# Patient Record
Sex: FEMALE | Employment: PART TIME | URBAN - METROPOLITAN AREA
[De-identification: names, ages, dates, MRNs, and addresses within clinical notes are randomized per-mention and may not be internally consistent; named-entity substitution may affect disease eponyms.]

---

## 2019-06-20 PROBLEM — F17.210 CIGARETTE SMOKER: Status: ACTIVE | Noted: 2019-06-20

## 2019-06-21 ENCOUNTER — HOSPITAL ENCOUNTER (OUTPATIENT)
Dept: GENERAL RADIOLOGY | Age: 59
Discharge: HOME | End: 2019-06-21
Payer: SELF-PAY

## 2019-06-21 ENCOUNTER — HOSPITAL ENCOUNTER (EMERGENCY)
Age: 59
Discharge: STILL ADMITTED OR ADMINISTRATIVE | End: 2019-06-21
Attending: EMERGENCY MEDICINE

## 2019-06-21 ENCOUNTER — APPOINTMENT (OUTPATIENT)
Dept: GENERAL RADIOLOGY | Age: 59
End: 2019-06-21

## 2019-06-21 DIAGNOSIS — G89.29 CHRONIC LEFT-SIDED LOW BACK PAIN WITH LEFT-SIDED SCIATICA: ICD-10-CM

## 2019-06-21 DIAGNOSIS — M54.42 CHRONIC LEFT-SIDED LOW BACK PAIN WITH LEFT-SIDED SCIATICA: ICD-10-CM

## 2019-06-21 PROCEDURE — 75810000275 HC EMERGENCY DEPT VISIT NO LEVEL OF CARE: Performed by: EMERGENCY MEDICINE

## 2019-06-21 PROCEDURE — 72110 X-RAY EXAM L-2 SPINE 4/>VWS: CPT

## 2019-06-21 PROCEDURE — 72220 X-RAY EXAM SACRUM TAILBONE: CPT

## 2019-09-16 PROBLEM — E78.01 FAMILIAL HYPERCHOLESTEREMIA: Status: ACTIVE | Noted: 2019-09-16

## 2020-01-30 ENCOUNTER — PATIENT OUTREACH (OUTPATIENT)
Dept: CASE MANAGEMENT | Age: 60
End: 2020-01-30

## 2020-01-30 NOTE — PROGRESS NOTES
CCM received referral from pcp office for medication assistance. CCM outreached to patient. no answer at this time, message left. If no response, CCM will outreach patient tomorrow 1/31/20. Referral was sent to [employer] by  for assistance. Message left with [employer] to ensure they received all information. This note will not be viewable in [address]

## 2020-01-31 ENCOUNTER — PATIENT OUTREACH (OUTPATIENT)
Dept: CASE MANAGEMENT | Age: 60
End: 2020-01-31

## 2020-01-31 NOTE — PROGRESS NOTES
CCM received call from access health liason and she states she has not received the referral from pcp office. CCM contacted pcp office to see if they can refax referral. CCM received call back from patient. Patient states she is needing assistance with her cholesterol medication Repatha. CCM discussed that I am in the process of working with [employer] and they will contact patient once all information received. CCM also emailed patient  web site as they also have assistance as well. Pt appreciative. CCM will follow up next week with patient. Patient agreeable. This note will not be viewable in [address]

## 2020-01-31 NOTE — PROGRESS NOTES
CCM outreached to patient. No answer at this time, message left. If no response, CCM will outreach to patient on 2/5/20. This note will not be viewable in [address]

## 2020-02-05 ENCOUNTER — PATIENT OUTREACH (OUTPATIENT)
Dept: CASE MANAGEMENT | Age: 60
End: 2020-02-05

## 2020-02-05 NOTE — PROGRESS NOTES
CCM received call from patient. Patient states she is doing well at this time. Patient states she has signed up with [facility] and is able to afford cholesterol medication with coupon. Patient did state that she was trying to get a cardiologist at this time. Discussed with patient if she ever signed up for [facility] financial assistance. CCM to email patient financial assistance application. CCM discussed with patient that I will follow up next week. Patient agreeable. This note will not be viewable in [address]

## 2020-02-05 NOTE — PROGRESS NOTES
CCM outreached to patient. No answer at this time, message left. Awaiting response. [facility] also left message with [facility] liaison to confirm if they received referral information. Awaiting response. [facility] will outreach patient once I hear back from [facility]. This note will not be viewable in [address]

## 2020-02-06 ENCOUNTER — PATIENT OUTREACH (OUTPATIENT)
Dept: CASE MANAGEMENT | Age: 60
End: 2020-02-06

## 2020-02-06 NOTE — PROGRESS NOTES
Received call from [facility] liaison stating they did receive the referral for patient. Discussed that they will be reaching out to patient. Patient made aware. This note will not be viewable in [address]

## 2020-02-12 ENCOUNTER — PATIENT OUTREACH (OUTPATIENT)
Dept: CASE MANAGEMENT | Age: 60
End: 2020-02-12

## 2020-02-12 NOTE — PROGRESS NOTES
CCM outreached to patient. No answer at this time, message left. If no response, [facility] will outreach next week. This note will not be viewable in [address]

## 2020-02-19 ENCOUNTER — PATIENT OUTREACH (OUTPATIENT)
Dept: CASE MANAGEMENT | Age: 60
End: 2020-02-19

## 2020-02-19 NOTE — PROGRESS NOTES
CCM outreached to patient. No answer at this time, message left. Awaiting response. If no response, [facility] will outreach next week. This note will not be viewable in [address]

## 2020-02-26 ENCOUNTER — PATIENT OUTREACH (OUTPATIENT)
Dept: CASE MANAGEMENT | Age: 60
End: 2020-02-26

## 2020-02-26 NOTE — PROGRESS NOTES
CCM outreached to patient. Patient states she is doing well at this time. CCM asked patient if she has heard from [employer]. Patient states she has not heard from them yet. CCm dsiscused with patient if she has not heard from them next week CCM will call and follow up. CCM discussed with patient that I would follow up next week. Patient agreeable. This note will not be viewable in [address]

## 2020-03-04 ENCOUNTER — PATIENT OUTREACH (OUTPATIENT)
Dept: CASE MANAGEMENT | Age: 60
End: 2020-03-04

## 2020-03-04 NOTE — PROGRESS NOTES
CCM outreached to patient. Patient states she is doing well. Patient states she did hear from [employer] and they are going to help with medication prescription assistance. Patient did inquire about pcp mentioning referral to cardiologist. Misa De La Cruz outreached to pcp office. Awaiting response. This note will not be viewable in [address]

## 2020-03-11 ENCOUNTER — PATIENT OUTREACH (OUTPATIENT)
Dept: CASE MANAGEMENT | Age: 60
End: 2020-03-11

## 2020-03-11 NOTE — PROGRESS NOTES
CCM received message from pcp office stating to have patient discuss referral to cardiologist when she sees pcp next. Message left for patient. This note will not be viewable in [address]

## 2020-03-11 NOTE — PROGRESS NOTES
CCM outreached to patient. Patient states she is doing well at this time. Patient states no questions or concerns at this time. Discussed with patient that [facility] outreached to pcp and no referral was sent to cardiologist. Belkis Hamlin outreached to pcp again to discuss cardiology referral. Awaiting response. This note will not be viewable in [address]

## 2020-03-20 ENCOUNTER — PATIENT OUTREACH (OUTPATIENT)
Dept: CASE MANAGEMENT | Age: 60
End: 2020-03-20

## 2020-03-20 NOTE — PROGRESS NOTES
This note will not be viewable in [address] 1st Attempt to contact patient for RAMO GONZALEZ call, no answer, left message on voice mail to please return my call. Will attempt to contact patient again within 1 business day.

## 2020-03-24 ENCOUNTER — PATIENT OUTREACH (OUTPATIENT)
Dept: CASE MANAGEMENT | Age: 60
End: 2020-03-24

## 2020-03-24 NOTE — PROGRESS NOTES
2nd attempt to contact patient for CCM F/U call, no answer, left message on voice mail to please return my call. . Will attempt 3rd call within 5 business days.

## 2020-04-02 ENCOUNTER — PATIENT OUTREACH (OUTPATIENT)
Dept: CASE MANAGEMENT | Age: 60
End: 2020-04-02

## 2020-04-02 NOTE — PROGRESS NOTES
Ambulatory Care Management  Follow up Outreach Note   Outreach type: Phone call: spoke with Patient     Date/Time of Outreach: 4/2/20 5:55 PM     Reason for follow-up:   High Risk for Readmission   Disease specific complaints/issues: Medication Assistance     Patient progress towards goals set from last contact:   Patient stated she is still trying to get her cholesterol down. Has patient attended any PCP or specialist follow-up appointments since last contact? What was outcome of appointment? When is next follow-up scheduled? Yes      Dr. Savana Hoang 4/28/20 @ 8 AM, virtual visit   Review medications. Any medication changes since last outreach? Does patient have any questions or issues related to their medications? Yes    No      None at time of CCM follow up call     Home health active? If yes  any issue? Progress? None at time of CCM follow up call   Referrals needed?  (SW, Diabetes education, HH, etc. ) None stated at time of CCM follow up call   Other issues/Miscellaneous? (Transportation, access to meals, ability to perform ADLs, adequate caregiver support, etc.)   Patient independent with ADLs.           Next Outreach Scheduled: Yes     Next Steps/Goals:      Ambulatory Care Manager/ LPN Care Coordinator Eve Yip LPN Care Coordinator

## 2020-04-10 ENCOUNTER — PATIENT OUTREACH (OUTPATIENT)
Dept: CASE MANAGEMENT | Age: 60
End: 2020-04-10

## 2020-05-13 ENCOUNTER — PATIENT OUTREACH (OUTPATIENT)
Dept: CASE MANAGEMENT | Age: 60
End: 2020-05-13

## 2020-05-13 NOTE — PROGRESS NOTES
Attempted outreach # 3 to patient - UTR no answer at this time  Care coordinator had attempted to outreach the other 2 times with no success  PLAN:  Will close the case at this time due to 3 unsuccessful outreach attempts   Will be happy to reopen case if patient were to return calls to myself or care coordinator   This note will not be viewable in [address]

## 2020-07-01 ENCOUNTER — PATIENT OUTREACH (OUTPATIENT)
Dept: CASE MANAGEMENT | Age: 60
End: 2020-07-01

## 2020-07-01 NOTE — PROGRESS NOTES
RADHIKA SANDRA called to try and touch base with pt. However, RADHIKA SANDRA got pt's voice mail and had to leave a message. PLAN:  RADHIKA SANDRA will attempt to reach pt again tomorrow if RADHIKA JOCY doesn't hear back from her today.

## 2020-07-02 ENCOUNTER — PATIENT OUTREACH (OUTPATIENT)
Dept: CASE MANAGEMENT | Age: 60
End: 2020-07-02

## 2020-07-02 NOTE — PROGRESS NOTES
Ambulatory Care Coordination  Social Work Assessment   Referral from which [facility] CM: Dr. Jonathan Gamble    Previously referred? If so, reason and brief outcome No    Reason for current referral: Community support    Income information (if needed): Said her income varies each month because her working is    Sources of Support: Family    ACP set up? Needs information? NA   Medication Cost assistance needed? NA   Referral to CLTC/Medicaid needed? NA   Referral to Medicare Extra Help/LIS program needed? NA   Small home repair needed? NA   MOW referral? NA   Any other concerns/questions? NA   Next steps: See below      RADHIKA SANDRA called and spoke with pt who said that she feels like she's making it right now but she doesn't know what will come of the cardiologist appointment she has coming up. RADHIKA SANDRA talked with pt about the [facility] team and she said that she doesn't really like having anything to due with [facility]. RADHIKA SANDRA encouraged pt to just hear what supports/services they may be able to provide her with. Pt said that currently she doesn't have any trouble affording her medications. RADHIKA SANDRA is going to e-mail pt a copy of the [address] [place] [address] financial assistance application along with Jong Rivera contact information for [facility] to be screened to see if she can qualify for assistance through [address] [place] [address] for her appointments.     PLAN:  RADHIKA SANDRA will follow up with pt in two weeks to see how things are going and if she's heard from [facility] team.

## 2020-07-17 ENCOUNTER — PATIENT OUTREACH (OUTPATIENT)
Dept: CASE MANAGEMENT | Age: 60
End: 2020-07-17

## 2020-07-17 NOTE — PROGRESS NOTES
RADHIKA SANDRA called and spoke with pt who said that she never got the e-mail from RADHIKA SANDRA about the [address] application. RADHIKA SANDRA encouraged pt to look in her junk mail, but that RADHIKA SANDRA would also send out another e-mail to her. RADHIKA SANDRA asked pt if she had ever heard from the [facility] team when MICHELE Dow worked with her in the past.  Pt said she never got a call from any other teams. RADHIKA SANDRA let pt know that RADHIKA SANDRA will ask PCP office to send another referral for the [facility] team.      RADHIKA SANDRA explained to pt that when she gets the [address] application since she is completely uninsured she's going to have to be screened by the [facility] team and gave pt the contact for CORBIN Jensen with [employer]. Pt was able to verbalize understanding. PLAN:  RADHIKA SANDRA will follow back up with pt, pt knows how to get in touch with RADHIKA SANDRA if she has questions before RADHIKA SANDRA reaches out.

## 2020-07-31 ENCOUNTER — PATIENT OUTREACH (OUTPATIENT)
Dept: CASE MANAGEMENT | Age: 60
End: 2020-07-31

## 2020-07-31 NOTE — PROGRESS NOTES
RADHIKA SANDRA called and spoke with pt who said that she hasn't been able to get the e-mail open. Pt asked if RADHIKA SANDRA could just mail out the [employer] application, RADHIKA SANDRA let her know that she can. Pt was very thankful for RADHIKA SANDRA's help.     PLAN:  RADHIKA SANDRA will mail out FA application and follow up with pt

## 2020-08-13 ENCOUNTER — PATIENT OUTREACH (OUTPATIENT)
Dept: CASE MANAGEMENT | Age: 60
End: 2020-08-13

## 2020-08-13 NOTE — PROGRESS NOTES
RADHIKA SANDRA called and spoke with pt who said that she has gotten the [employer] application in the mail. RADHIKA SANDRA talked with pt about how to fill out the application and that she needs to make sure she talks with the [facility] office before she turns in the application because since she's completely uninsured if she's not screened by [facility] her application will automatically be denied. Pt was able to verbalize understanding.     PLAN:  RADHIKA SANDRA will continue to follow up with pt for support

## 2020-08-27 ENCOUNTER — PATIENT OUTREACH (OUTPATIENT)
Dept: CASE MANAGEMENT | Age: 60
End: 2020-08-27

## 2020-08-27 NOTE — PROGRESS NOTES
RADHIKA SANDRA called to follow up with pt. Pt stated that she hasn't heard from anyone. RADHIKA SANDRA talked with pt again about the fact that SHE has to call the [facility] team to get screened. Pt stated she forgot about that and asked if RADHIKA SANDRA can give her the contact information needed for the [facility] team.  RADHIKA SANDRA provided pt with that contact info. PLAN:  RADHIKA SANDRA will continue to work with pt to help her through the process for Carla Rashid assistance.

## 2020-09-10 ENCOUNTER — PATIENT OUTREACH (OUTPATIENT)
Dept: CASE MANAGEMENT | Age: 60
End: 2020-09-10

## 2020-09-10 NOTE — PROGRESS NOTES
RADHIKA SANDRA called to try and touch base with pt. However, RADHIKA SANDRA got pt's voice mail and had to leave a message. PLAN:  RADHIKA SANDRA will attempt to reach pt again next week if RADHIKA SANDRA doesn't hear from pt before then.

## 2020-09-17 ENCOUNTER — PATIENT OUTREACH (OUTPATIENT)
Dept: CASE MANAGEMENT | Age: 60
End: 2020-09-17

## 2020-09-17 NOTE — PROGRESS NOTES
RADHIKA SANDRA called to try and touch base with pt. However, RADHIKA SANDRA was unable to reach pt. PLAN:  RADHIKA SANDRA will attempt to reach pt again in a week if RADHIKA SANDRA doesn't hear from her before then.

## 2020-09-24 ENCOUNTER — PATIENT OUTREACH (OUTPATIENT)
Dept: CASE MANAGEMENT | Age: 60
End: 2020-09-24

## 2020-09-24 NOTE — PROGRESS NOTES
RADHIKA SANDRA called and spoke with pt who said that she completed the application RADHIKA SANDRA mailed her. RADHIKA SANDRA asked pt if she touched base with the [facility] team before mailing in the application. Pt said that she hasn't had time to call the [facility] team that she just filled out the application and mailed it in.  RADHIKA SANDRA let pt know that she may get denied for services because she hasn't been screened by the [facility] team.  RDAHIKA SANDRA did give pt the number to call and track the progress of her application. Pt thanked RADHIKA SANDRA for her help and said she's not sure if she really needs anything else. PLAN:  RADHIKA SANDRA will remain available if pt has questions over the next two weeks. If RADHIKA SANDRA doesn't hear from pt RADHIKA SANDRA will remove herself from pt's care team and close CCM episode.

## 2020-10-08 ENCOUNTER — PATIENT OUTREACH (OUTPATIENT)
Dept: CASE MANAGEMENT | Age: 60
End: 2020-10-08

## 2020-10-08 NOTE — PROGRESS NOTES
RADHIKA SANDRA has not heard back from pt. RADHIKA JOCY stressed to pt during the last phone call the importance of calling and touching base with the [facility] team to make sure that she's screened appropriately since she is self pay to be able to be eligible for [address] [place] [address] financial assistance. Pt verbalized understanding and stated she would complete this. RADHIKA JOCY has not heard back from pt with any questions or concerns since last contact. PLAN:  RADHIKA JOCY will close CCM episode and remove herself from pt's care team at this time.

## 2022-03-18 PROBLEM — F17.210 CIGARETTE SMOKER: Status: ACTIVE | Noted: 2019-06-20

## 2022-03-19 PROBLEM — E78.01 FAMILIAL HYPERCHOLESTEREMIA: Status: ACTIVE | Noted: 2019-09-16

## 2023-03-02 ENCOUNTER — HOSPITAL ENCOUNTER (EMERGENCY)
Age: 63
Discharge: ANOTHER HOSPITAL | End: 2023-03-03
Attending: EMERGENCY MEDICINE
Payer: COMMERCIAL

## 2023-03-02 DIAGNOSIS — R07.9 CHEST PAIN, UNSPECIFIED TYPE: Primary | ICD-10-CM

## 2023-03-02 PROCEDURE — 94761 N-INVAS EAR/PLS OXIMETRY MLT: CPT | Performed by: EMERGENCY MEDICINE

## 2023-03-02 PROCEDURE — 99285 EMERGENCY DEPT VISIT HI MDM: CPT | Performed by: EMERGENCY MEDICINE

## 2023-03-03 ENCOUNTER — PREP FOR PROCEDURE (OUTPATIENT)
Dept: CARDIOTHORACIC SURGERY | Age: 63
End: 2023-03-03

## 2023-03-03 ENCOUNTER — APPOINTMENT (OUTPATIENT)
Dept: ULTRASOUND IMAGING | Age: 63
DRG: 234 | End: 2023-03-03
Attending: INTERNAL MEDICINE
Payer: COMMERCIAL

## 2023-03-03 ENCOUNTER — APPOINTMENT (OUTPATIENT)
Dept: NON INVASIVE DIAGNOSTICS | Age: 63
DRG: 234 | End: 2023-03-03
Attending: INTERNAL MEDICINE
Payer: COMMERCIAL

## 2023-03-03 ENCOUNTER — APPOINTMENT (OUTPATIENT)
Dept: GENERAL RADIOLOGY | Age: 63
End: 2023-03-03
Payer: COMMERCIAL

## 2023-03-03 ENCOUNTER — HOSPITAL ENCOUNTER (INPATIENT)
Age: 63
LOS: 6 days | Discharge: HOME HEALTH | DRG: 234 | End: 2023-03-10
Attending: INTERNAL MEDICINE | Admitting: THORACIC SURGERY (CARDIOTHORACIC VASCULAR SURGERY)
Payer: COMMERCIAL

## 2023-03-03 VITALS
BODY MASS INDEX: 25.01 KG/M2 | WEIGHT: 165 LBS | RESPIRATION RATE: 19 BRPM | HEART RATE: 63 BPM | DIASTOLIC BLOOD PRESSURE: 76 MMHG | HEIGHT: 68 IN | OXYGEN SATURATION: 98 % | SYSTOLIC BLOOD PRESSURE: 136 MMHG | TEMPERATURE: 97.6 F

## 2023-03-03 DIAGNOSIS — Z95.1 S/P CABG X 4: ICD-10-CM

## 2023-03-03 DIAGNOSIS — I20.0 UNSTABLE ANGINA (HCC): ICD-10-CM

## 2023-03-03 DIAGNOSIS — R07.9 CHEST PAIN: ICD-10-CM

## 2023-03-03 DIAGNOSIS — R07.2 PRECORDIAL PAIN: Primary | ICD-10-CM

## 2023-03-03 PROBLEM — E78.01 FAMILIAL HYPERCHOLESTEREMIA: Status: ACTIVE | Noted: 2019-09-16

## 2023-03-03 PROBLEM — I10 HYPERTENSION: Status: ACTIVE | Noted: 2023-03-03

## 2023-03-03 PROBLEM — E78.01 FAMILIAL HYPERCHOLESTEREMIA: Chronic | Status: ACTIVE | Noted: 2019-09-16

## 2023-03-03 PROBLEM — F17.210 CIGARETTE SMOKER: Chronic | Status: ACTIVE | Noted: 2019-06-20

## 2023-03-03 PROBLEM — R79.89 ELEVATED LFTS: Chronic | Status: ACTIVE | Noted: 2023-03-03

## 2023-03-03 PROBLEM — F17.210 CIGARETTE SMOKER: Status: ACTIVE | Noted: 2019-06-20

## 2023-03-03 PROBLEM — R79.89 ELEVATED LFTS: Status: ACTIVE | Noted: 2023-03-03

## 2023-03-03 PROBLEM — I10 HYPERTENSION: Chronic | Status: ACTIVE | Noted: 2023-03-03

## 2023-03-03 PROBLEM — I25.110 ATHEROSCLEROTIC HEART DISEASE OF NATIVE CORONARY ARTERY WITH UNSTABLE ANGINA PECTORIS (HCC): Status: ACTIVE | Noted: 2023-03-03

## 2023-03-03 LAB
ALBUMIN SERPL-MCNC: 5.1 G/DL (ref 3.2–4.6)
ALBUMIN/GLOB SERPL: 1.8 (ref 0.4–1.6)
ALP SERPL-CCNC: 139 U/L (ref 45–117)
ALT SERPL-CCNC: 64 U/L (ref 13–61)
ANION GAP SERPL CALC-SCNC: 11 MMOL/L (ref 2–11)
APTT PPP: 47.3 SEC (ref 24.5–34.2)
AST SERPL-CCNC: 47 U/L (ref 15–37)
BILIRUB SERPL-MCNC: 0.3 MG/DL (ref 0.2–1.1)
BUN SERPL-MCNC: 18 MG/DL (ref 7–18)
CALCIUM SERPL-MCNC: 9.2 MG/DL (ref 8.3–10.4)
CHLORIDE SERPL-SCNC: 101 MMOL/L (ref 98–107)
CO2 SERPL-SCNC: 27 MMOL/L (ref 21–32)
CREAT SERPL-MCNC: 0.98 MG/DL (ref 0.6–1)
D DIMER PPP FEU-MCNC: 0.46 UG/ML(FEU)
ECHO AO ASC DIAM: 3.2 CM
ECHO AO ASCENDING AORTA INDEX: 1.67 CM/M2
ECHO AO ROOT DIAM: 2.8 CM
ECHO AO ROOT INDEX: 1.46 CM/M2
ECHO AV AREA PEAK VELOCITY: 2.7 CM2
ECHO AV AREA VTI: 2.4 CM2
ECHO AV AREA/BSA PEAK VELOCITY: 1.4 CM2/M2
ECHO AV AREA/BSA VTI: 1.3 CM2/M2
ECHO AV MEAN GRADIENT: 3 MMHG
ECHO AV MEAN VELOCITY: 0.9 M/S
ECHO AV PEAK GRADIENT: 7 MMHG
ECHO AV PEAK VELOCITY: 1.3 M/S
ECHO AV VELOCITY RATIO: 0.85
ECHO AV VTI: 32.8 CM
ECHO BSA: 1.94 M2
ECHO BSA: 1.94 M2
ECHO EST RA PRESSURE: 3 MMHG
ECHO IVC PROX: 1.8 CM
ECHO LA AREA 2C: 15.5 CM2
ECHO LA AREA 4C: 17.5 CM2
ECHO LA DIAMETER INDEX: 1.56 CM/M2
ECHO LA DIAMETER: 3 CM
ECHO LA MAJOR AXIS: 5.5 CM
ECHO LA MINOR AXIS: 5.1 CM
ECHO LA TO AORTIC ROOT RATIO: 1.07
ECHO LA VOL 2C: 38 ML (ref 22–52)
ECHO LA VOL 4C: 44 ML (ref 22–52)
ECHO LA VOL BP: 42 ML (ref 22–52)
ECHO LA VOL/BSA BIPLANE: 22 ML/M2 (ref 16–34)
ECHO LA VOLUME INDEX A2C: 20 ML/M2 (ref 16–34)
ECHO LA VOLUME INDEX A4C: 23 ML/M2 (ref 16–34)
ECHO LV E' LATERAL VELOCITY: 12 CM/S
ECHO LV E' SEPTAL VELOCITY: 11 CM/S
ECHO LV EDV A2C: 100 ML
ECHO LV EDV A4C: 90 ML
ECHO LV EDV INDEX A4C: 47 ML/M2
ECHO LV EDV NDEX A2C: 52 ML/M2
ECHO LV EJECTION FRACTION A2C: 54 %
ECHO LV EJECTION FRACTION A4C: 60 %
ECHO LV EJECTION FRACTION BIPLANE: 57 % (ref 55–100)
ECHO LV ESV A2C: 46 ML
ECHO LV ESV A4C: 36 ML
ECHO LV ESV INDEX A2C: 24 ML/M2
ECHO LV ESV INDEX A4C: 19 ML/M2
ECHO LV FRACTIONAL SHORTENING: 29 % (ref 28–44)
ECHO LV INTERNAL DIMENSION DIASTOLE INDEX: 2.66 CM/M2
ECHO LV INTERNAL DIMENSION DIASTOLIC: 5.1 CM (ref 3.9–5.3)
ECHO LV INTERNAL DIMENSION SYSTOLIC INDEX: 1.88 CM/M2
ECHO LV INTERNAL DIMENSION SYSTOLIC: 3.6 CM
ECHO LV IVSD: 1 CM (ref 0.6–0.9)
ECHO LV MASS 2D: 200.8 G (ref 67–162)
ECHO LV MASS INDEX 2D: 104.6 G/M2 (ref 43–95)
ECHO LV POSTERIOR WALL DIASTOLIC: 1.1 CM (ref 0.6–0.9)
ECHO LV RELATIVE WALL THICKNESS RATIO: 0.43
ECHO LVOT AREA: 3.1 CM2
ECHO LVOT AV VTI INDEX: 0.77
ECHO LVOT DIAM: 2 CM
ECHO LVOT MEAN GRADIENT: 2 MMHG
ECHO LVOT PEAK GRADIENT: 5 MMHG
ECHO LVOT PEAK VELOCITY: 1.1 M/S
ECHO LVOT STROKE VOLUME INDEX: 41.4 ML/M2
ECHO LVOT SV: 79.4 ML
ECHO LVOT VTI: 25.3 CM
ECHO MV A VELOCITY: 0.65 M/S
ECHO MV E DECELERATION TIME (DT): 251 MS
ECHO MV E VELOCITY: 1 M/S
ECHO MV E/A RATIO: 1.54
ECHO MV E/E' LATERAL: 8.33
ECHO MV E/E' RATIO (AVERAGED): 8.71
ECHO MV E/E' SEPTAL: 9.09
ECHO PULMONARY ARTERY END DIASTOLIC PRESSURE: 4 MMHG
ECHO PV ACCELERATION TIME (AT): 206 MS
ECHO PV MAX VELOCITY: 0.8 M/S
ECHO PV PEAK GRADIENT: 2 MMHG
ECHO PV REGURGITANT MAX VELOCITY: 1 M/S
ECHO RIGHT VENTRICULAR SYSTOLIC PRESSURE (RVSP): 21 MMHG
ECHO RV BASAL DIMENSION: 3.1 CM
ECHO RV FREE WALL PEAK S': 11 CM/S
ECHO RV INTERNAL DIMENSION: 2.9 CM
ECHO RV TAPSE: 1.8 CM (ref 1.7–?)
ECHO TV REGURGITANT MAX VELOCITY: 2.13 M/S
ECHO TV REGURGITANT PEAK GRADIENT: 18 MMHG
EKG ATRIAL RATE: 76 BPM
EKG DIAGNOSIS: NORMAL
EKG P AXIS: 55 DEGREES
EKG P-R INTERVAL: 173 MS
EKG Q-T INTERVAL: 368 MS
EKG QRS DURATION: 98 MS
EKG QTC CALCULATION (BAZETT): 414 MS
EKG R AXIS: 46 DEGREES
EKG T AXIS: 219 DEGREES
EKG VENTRICULAR RATE: 76 BPM
ERYTHROCYTE [DISTWIDTH] IN BLOOD BY AUTOMATED COUNT: 14.6 % (ref 11.9–14.6)
ERYTHROCYTE [DISTWIDTH] IN BLOOD BY AUTOMATED COUNT: 14.6 % (ref 11.9–14.6)
GLOBULIN SER CALC-MCNC: 2.8 G/DL (ref 2.8–4.5)
GLUCOSE SERPL-MCNC: 135 MG/DL (ref 65–100)
HCT VFR BLD AUTO: 43.1 % (ref 35.8–46.3)
HCT VFR BLD AUTO: 43.4 % (ref 35.8–46.3)
HGB BLD-MCNC: 13.5 G/DL (ref 11.7–15.4)
HGB BLD-MCNC: 13.9 G/DL (ref 11.7–15.4)
INR PPP: 0.9
LIPASE SERPL-CCNC: 29 U/L (ref 13–60)
LV EF: 63 %
LVEF MODALITY: ABNORMAL
MAGNESIUM SERPL-MCNC: 2.5 MG/DL (ref 1.2–2.6)
MCH RBC QN AUTO: 30 PG (ref 26.1–32.9)
MCH RBC QN AUTO: 30 PG (ref 26.1–32.9)
MCHC RBC AUTO-ENTMCNC: 31.3 G/DL (ref 31.4–35)
MCHC RBC AUTO-ENTMCNC: 32 G/DL (ref 31.4–35)
MCV RBC AUTO: 93.5 FL (ref 82–102)
MCV RBC AUTO: 95.8 FL (ref 82–102)
NRBC # BLD: 0 K/UL (ref 0–0.2)
NRBC # BLD: 0 K/UL (ref 0–0.2)
PLATELET # BLD AUTO: 204 K/UL (ref 150–450)
PLATELET # BLD AUTO: 207 K/UL (ref 150–450)
PMV BLD AUTO: 10.8 FL (ref 9.4–12.3)
PMV BLD AUTO: 10.8 FL (ref 9.4–12.3)
POTASSIUM SERPL-SCNC: 4 MMOL/L (ref 3.5–5.1)
PROT SERPL-MCNC: 7.9 G/DL (ref 6.4–8.2)
PROTHROMBIN TIME: 12.9 SEC (ref 12.6–14.3)
RBC # BLD AUTO: 4.5 M/UL (ref 4.05–5.2)
RBC # BLD AUTO: 4.64 M/UL (ref 4.05–5.2)
SODIUM SERPL-SCNC: 139 MMOL/L (ref 133–143)
TROPONIN T SERPL HS-MCNC: 9.2 NG/L (ref 0–14)
TROPONIN T SERPL HS-MCNC: 9.5 NG/L (ref 0–14)
UFH PPP CHRO-ACNC: 0.18 IU/ML (ref 0.3–0.7)
WBC # BLD AUTO: 7.8 K/UL (ref 4.3–11.1)
WBC # BLD AUTO: 8.2 K/UL (ref 4.3–11.1)

## 2023-03-03 PROCEDURE — 6360000004 HC RX CONTRAST MEDICATION: Performed by: INTERNAL MEDICINE

## 2023-03-03 PROCEDURE — 85027 COMPLETE CBC AUTOMATED: CPT

## 2023-03-03 PROCEDURE — 2500000003 HC RX 250 WO HCPCS: Performed by: INTERNAL MEDICINE

## 2023-03-03 PROCEDURE — B2111ZZ FLUOROSCOPY OF MULTIPLE CORONARY ARTERIES USING LOW OSMOLAR CONTRAST: ICD-10-PCS | Performed by: INTERNAL MEDICINE

## 2023-03-03 PROCEDURE — 80053 COMPREHEN METABOLIC PANEL: CPT

## 2023-03-03 PROCEDURE — 99152 MOD SED SAME PHYS/QHP 5/>YRS: CPT | Performed by: INTERNAL MEDICINE

## 2023-03-03 PROCEDURE — 36415 COLL VENOUS BLD VENIPUNCTURE: CPT

## 2023-03-03 PROCEDURE — 71045 X-RAY EXAM CHEST 1 VIEW: CPT

## 2023-03-03 PROCEDURE — 83690 ASSAY OF LIPASE: CPT

## 2023-03-03 PROCEDURE — G0378 HOSPITAL OBSERVATION PER HR: HCPCS

## 2023-03-03 PROCEDURE — 84484 ASSAY OF TROPONIN QUANT: CPT

## 2023-03-03 PROCEDURE — 6360000002 HC RX W HCPCS: Performed by: PHYSICIAN ASSISTANT

## 2023-03-03 PROCEDURE — 93306 TTE W/DOPPLER COMPLETE: CPT

## 2023-03-03 PROCEDURE — 2580000003 HC RX 258: Performed by: NURSE PRACTITIONER

## 2023-03-03 PROCEDURE — 4A023N7 MEASUREMENT OF CARDIAC SAMPLING AND PRESSURE, LEFT HEART, PERCUTANEOUS APPROACH: ICD-10-PCS | Performed by: INTERNAL MEDICINE

## 2023-03-03 PROCEDURE — 6360000002 HC RX W HCPCS: Performed by: NURSE PRACTITIONER

## 2023-03-03 PROCEDURE — 85379 FIBRIN DEGRADATION QUANT: CPT

## 2023-03-03 PROCEDURE — 93458 L HRT ARTERY/VENTRICLE ANGIO: CPT | Performed by: INTERNAL MEDICINE

## 2023-03-03 PROCEDURE — 96375 TX/PRO/DX INJ NEW DRUG ADDON: CPT

## 2023-03-03 PROCEDURE — 85610 PROTHROMBIN TIME: CPT

## 2023-03-03 PROCEDURE — 6370000000 HC RX 637 (ALT 250 FOR IP): Performed by: INTERNAL MEDICINE

## 2023-03-03 PROCEDURE — 2709999900 HC NON-CHARGEABLE SUPPLY: Performed by: INTERNAL MEDICINE

## 2023-03-03 PROCEDURE — 93880 EXTRACRANIAL BILAT STUDY: CPT

## 2023-03-03 PROCEDURE — C1894 INTRO/SHEATH, NON-LASER: HCPCS | Performed by: INTERNAL MEDICINE

## 2023-03-03 PROCEDURE — 85520 HEPARIN ASSAY: CPT

## 2023-03-03 PROCEDURE — 99223 1ST HOSP IP/OBS HIGH 75: CPT | Performed by: INTERNAL MEDICINE

## 2023-03-03 PROCEDURE — 83735 ASSAY OF MAGNESIUM: CPT

## 2023-03-03 PROCEDURE — 6370000000 HC RX 637 (ALT 250 FOR IP): Performed by: NURSE PRACTITIONER

## 2023-03-03 PROCEDURE — G0379 DIRECT REFER HOSPITAL OBSERV: HCPCS

## 2023-03-03 PROCEDURE — 85730 THROMBOPLASTIN TIME PARTIAL: CPT

## 2023-03-03 PROCEDURE — 96374 THER/PROPH/DIAG INJ IV PUSH: CPT

## 2023-03-03 PROCEDURE — 6360000002 HC RX W HCPCS: Performed by: INTERNAL MEDICINE

## 2023-03-03 PROCEDURE — 93306 TTE W/DOPPLER COMPLETE: CPT | Performed by: INTERNAL MEDICINE

## 2023-03-03 RX ORDER — SODIUM CHLORIDE 0.9 % (FLUSH) 0.9 %
5-40 SYRINGE (ML) INJECTION PRN
Status: DISCONTINUED | OUTPATIENT
Start: 2023-03-03 | End: 2023-03-08

## 2023-03-03 RX ORDER — ACETAMINOPHEN 325 MG/1
650 TABLET ORAL EVERY 6 HOURS PRN
Status: DISCONTINUED | OUTPATIENT
Start: 2023-03-03 | End: 2023-03-06

## 2023-03-03 RX ORDER — HEPARIN SODIUM 1000 [USP'U]/ML
2000 INJECTION, SOLUTION INTRAVENOUS; SUBCUTANEOUS PRN
Status: DISCONTINUED | OUTPATIENT
Start: 2023-03-03 | End: 2023-03-06

## 2023-03-03 RX ORDER — LORAZEPAM 0.5 MG/1
0.5 TABLET ORAL EVERY 4 HOURS PRN
Status: DISCONTINUED | OUTPATIENT
Start: 2023-03-03 | End: 2023-03-06

## 2023-03-03 RX ORDER — HEPARIN SODIUM 1000 [USP'U]/ML
4000 INJECTION, SOLUTION INTRAVENOUS; SUBCUTANEOUS PRN
Status: DISCONTINUED | OUTPATIENT
Start: 2023-03-03 | End: 2023-03-06

## 2023-03-03 RX ORDER — NITROGLYCERIN 0.4 MG/1
0.4 TABLET SUBLINGUAL EVERY 5 MIN PRN
Status: DISCONTINUED | OUTPATIENT
Start: 2023-03-03 | End: 2023-03-06

## 2023-03-03 RX ORDER — POTASSIUM CHLORIDE 20 MEQ/1
40 TABLET, EXTENDED RELEASE ORAL PRN
Status: DISCONTINUED | OUTPATIENT
Start: 2023-03-03 | End: 2023-03-06

## 2023-03-03 RX ORDER — LEVOTHYROXINE SODIUM 0.1 MG/1
100 TABLET ORAL DAILY
Status: DISCONTINUED | OUTPATIENT
Start: 2023-03-03 | End: 2023-03-10 | Stop reason: HOSPADM

## 2023-03-03 RX ORDER — MORPHINE SULFATE 2 MG/ML
1 INJECTION, SOLUTION INTRAMUSCULAR; INTRAVENOUS ONCE
Status: COMPLETED | OUTPATIENT
Start: 2023-03-03 | End: 2023-03-03

## 2023-03-03 RX ORDER — LEVOTHYROXINE SODIUM 0.1 MG/1
100 TABLET ORAL DAILY
COMMUNITY
End: 2023-03-23 | Stop reason: ALTCHOICE

## 2023-03-03 RX ORDER — LIDOCAINE HYDROCHLORIDE 10 MG/ML
INJECTION, SOLUTION INFILTRATION; PERINEURAL PRN
Status: DISCONTINUED | OUTPATIENT
Start: 2023-03-03 | End: 2023-03-03 | Stop reason: HOSPADM

## 2023-03-03 RX ORDER — AMIODARONE HYDROCHLORIDE 200 MG/1
600 TABLET ORAL 2 TIMES DAILY
Status: DISCONTINUED | OUTPATIENT
Start: 2023-03-04 | End: 2023-03-04

## 2023-03-03 RX ORDER — ASPIRIN 81 MG/1
81 TABLET, CHEWABLE ORAL DAILY
Status: DISCONTINUED | OUTPATIENT
Start: 2023-03-03 | End: 2023-03-03 | Stop reason: SDUPTHER

## 2023-03-03 RX ORDER — MAGNESIUM SULFATE IN WATER 40 MG/ML
2000 INJECTION, SOLUTION INTRAVENOUS PRN
Status: DISCONTINUED | OUTPATIENT
Start: 2023-03-03 | End: 2023-03-07 | Stop reason: SDUPTHER

## 2023-03-03 RX ORDER — HEPARIN SODIUM 10000 [USP'U]/100ML
5-30 INJECTION, SOLUTION INTRAVENOUS CONTINUOUS
Status: DISCONTINUED | OUTPATIENT
Start: 2023-03-03 | End: 2023-03-06

## 2023-03-03 RX ORDER — ASPIRIN 81 MG/1
81 TABLET ORAL DAILY
Status: DISCONTINUED | OUTPATIENT
Start: 2023-03-04 | End: 2023-03-08

## 2023-03-03 RX ORDER — HEPARIN SODIUM 200 [USP'U]/100ML
INJECTION, SOLUTION INTRAVENOUS CONTINUOUS PRN
Status: COMPLETED | OUTPATIENT
Start: 2023-03-03 | End: 2023-03-03

## 2023-03-03 RX ORDER — POLYETHYLENE GLYCOL 3350 17 G/17G
17 POWDER, FOR SOLUTION ORAL DAILY PRN
Status: DISCONTINUED | OUTPATIENT
Start: 2023-03-03 | End: 2023-03-08

## 2023-03-03 RX ORDER — IPRATROPIUM BROMIDE AND ALBUTEROL SULFATE 2.5; .5 MG/3ML; MG/3ML
1 SOLUTION RESPIRATORY (INHALATION) EVERY 4 HOURS PRN
Status: DISCONTINUED | OUTPATIENT
Start: 2023-03-03 | End: 2023-03-08

## 2023-03-03 RX ORDER — ROSUVASTATIN CALCIUM 20 MG/1
40 TABLET, COATED ORAL NIGHTLY
Status: DISCONTINUED | OUTPATIENT
Start: 2023-03-03 | End: 2023-03-10 | Stop reason: HOSPADM

## 2023-03-03 RX ORDER — SODIUM CHLORIDE 9 MG/ML
INJECTION, SOLUTION INTRAVENOUS CONTINUOUS
Status: DISCONTINUED | OUTPATIENT
Start: 2023-03-03 | End: 2023-03-04

## 2023-03-03 RX ORDER — SODIUM CHLORIDE 0.9 % (FLUSH) 0.9 %
5-40 SYRINGE (ML) INJECTION EVERY 12 HOURS SCHEDULED
Status: DISCONTINUED | OUTPATIENT
Start: 2023-03-03 | End: 2023-03-08

## 2023-03-03 RX ORDER — MIDAZOLAM HYDROCHLORIDE 1 MG/ML
INJECTION INTRAMUSCULAR; INTRAVENOUS PRN
Status: DISCONTINUED | OUTPATIENT
Start: 2023-03-03 | End: 2023-03-03 | Stop reason: HOSPADM

## 2023-03-03 RX ORDER — POTASSIUM CHLORIDE 7.45 MG/ML
10 INJECTION INTRAVENOUS PRN
Status: DISCONTINUED | OUTPATIENT
Start: 2023-03-03 | End: 2023-03-06

## 2023-03-03 RX ORDER — ONDANSETRON 2 MG/ML
4 INJECTION INTRAMUSCULAR; INTRAVENOUS EVERY 4 HOURS PRN
Status: DISCONTINUED | OUTPATIENT
Start: 2023-03-03 | End: 2023-03-06

## 2023-03-03 RX ADMIN — SODIUM CHLORIDE: 9 INJECTION, SOLUTION INTRAVENOUS at 06:38

## 2023-03-03 RX ADMIN — NITROGLYCERIN 1 INCH: 20 OINTMENT TOPICAL at 07:33

## 2023-03-03 RX ADMIN — HEPARIN SODIUM 12 UNITS/KG/HR: 10000 INJECTION, SOLUTION INTRAVENOUS at 17:16

## 2023-03-03 RX ADMIN — NITROGLYCERIN 0.4 MG: 0.4 TABLET, ORALLY DISINTEGRATING SUBLINGUAL at 07:25

## 2023-03-03 RX ADMIN — MORPHINE SULFATE 1 MG: 2 INJECTION, SOLUTION INTRAMUSCULAR; INTRAVENOUS at 07:58

## 2023-03-03 RX ADMIN — METOPROLOL TARTRATE 25 MG: 25 TABLET, FILM COATED ORAL at 21:19

## 2023-03-03 RX ADMIN — METOPROLOL TARTRATE 25 MG: 25 TABLET, FILM COATED ORAL at 07:33

## 2023-03-03 RX ADMIN — ROSUVASTATIN CALCIUM 40 MG: 20 TABLET, COATED ORAL at 21:19

## 2023-03-03 RX ADMIN — SODIUM CHLORIDE, PRESERVATIVE FREE 5 ML: 5 INJECTION INTRAVENOUS at 08:01

## 2023-03-03 RX ADMIN — LEVOTHYROXINE SODIUM 100 MCG: 0.1 TABLET ORAL at 06:52

## 2023-03-03 RX ADMIN — SODIUM CHLORIDE, PRESERVATIVE FREE 10 ML: 5 INJECTION INTRAVENOUS at 21:20

## 2023-03-03 RX ADMIN — ASPIRIN 81 MG 81 MG: 81 TABLET ORAL at 07:33

## 2023-03-03 ASSESSMENT — PAIN SCALES - GENERAL
PAINLEVEL_OUTOF10: 0
PAINLEVEL_OUTOF10: 10
PAINLEVEL_OUTOF10: 6
PAINLEVEL_OUTOF10: 8
PAINLEVEL_OUTOF10: 2
PAINLEVEL_OUTOF10: 0

## 2023-03-03 ASSESSMENT — PAIN DESCRIPTION - DESCRIPTORS
DESCRIPTORS: ACHING;SHOOTING;STABBING
DESCRIPTORS: ACHING;SHOOTING
DESCRIPTORS: CRAMPING;BURNING
DESCRIPTORS: ACHING

## 2023-03-03 ASSESSMENT — ENCOUNTER SYMPTOMS
BACK PAIN: 1
COUGH: 0
BACK PAIN: 0
VOMITING: 0
SORE THROAT: 0
SHORTNESS OF BREATH: 1
RESPIRATORY NEGATIVE: 1
NAUSEA: 0
ABDOMINAL PAIN: 0
EYES NEGATIVE: 1

## 2023-03-03 ASSESSMENT — LIFESTYLE VARIABLES
HOW MANY STANDARD DRINKS CONTAINING ALCOHOL DO YOU HAVE ON A TYPICAL DAY: PATIENT DOES NOT DRINK
HOW OFTEN DO YOU HAVE A DRINK CONTAINING ALCOHOL: NEVER

## 2023-03-03 ASSESSMENT — PAIN DESCRIPTION - LOCATION
LOCATION: CHEST

## 2023-03-03 ASSESSMENT — PAIN DESCRIPTION - PAIN TYPE
TYPE: ACUTE PAIN

## 2023-03-03 ASSESSMENT — PAIN - FUNCTIONAL ASSESSMENT
PAIN_FUNCTIONAL_ASSESSMENT: PREVENTS OR INTERFERES WITH MANY ACTIVE NOT PASSIVE ACTIVITIES
PAIN_FUNCTIONAL_ASSESSMENT: ACTIVITIES ARE NOT PREVENTED
PAIN_FUNCTIONAL_ASSESSMENT: 0-10

## 2023-03-03 ASSESSMENT — PAIN DESCRIPTION - ORIENTATION
ORIENTATION: LEFT
ORIENTATION: MID;ANTERIOR
ORIENTATION: MID
ORIENTATION: MID;ANTERIOR

## 2023-03-03 NOTE — CONSULTS
History and Physical Initial Visit NOTE           3/3/2023    Jessica Moctezuma                        Date of Admission:  3/3/2023    The patient's chart is reviewed and the patient is discussed with the staff. Subjective:     Patient is a 59 y.o.  female seen and evaluated at the request of Dr. Susanna Hall prior to CABG. Patient has a PMH of HLD (has been intolerant to treatment with myalgias in past), fatty liver, and hypothyroid. She is a current smoker, smokes up to 1 ppd for 45 years. She says now she usually only smokes 4-5 total cigarettes. She presented to ED on 3/2 with chest pain/shoulder pain that started that day while she was at work. Cardiology admitted patient with plans for LHC. Troponins were negative. EKG showed SR with diffuse inverted T waves. LHC completed on 3/3 and showed multivessel disease with plans for CABG. She denies any history of COPD/emphysema. Does not use O2 at home. No inhaler or nebulizer use. States she doesn't like to take medications. Says she has some shortness of breath daily with congested cough occasionally. Seen immediately after her LHC and she is quite anxious and irritable. Wants to go smoke.        Review of Systems: Comprehensive ROS negative except in HPI    Current Outpatient Medications   Medication Instructions    levothyroxine (SYNTHROID) 100 mcg, Oral, DAILY      Past Medical History:   Diagnosis Date    Hyperlipidemia     Hypothyroid      Past Surgical History:   Procedure Laterality Date    CHOLECYSTECTOMY      GYN      hysterectomy     Social History     Socioeconomic History    Marital status:      Spouse name: Not on file    Number of children: Not on file    Years of education: Not on file    Highest education level: Not on file   Occupational History    Not on file   Tobacco Use    Smoking status: Every Day     Packs/day: 1.00     Types: Cigarettes    Smokeless tobacco: Never   Substance and Sexual Activity    Alcohol use: No    Drug use: No    Sexual activity: Not on file   Other Topics Concern    Not on file   Social History Narrative    Not on file     Social Determinants of Health     Financial Resource Strain: Not on file   Food Insecurity: Not on file   Transportation Needs: Not on file   Physical Activity: Not on file   Stress: Not on file   Social Connections: Not on file   Intimate Partner Violence: Not on file   Housing Stability: Not on file     Family History   Problem Relation Age of Onset    No Known Problems Sister     No Known Problems Brother      Allergies   Allergen Reactions    Pcn [Penicillins] Nausea And Vomiting     Objective:   Blood pressure 112/60, pulse 54, temperature 97.5 °F (36.4 °C), temperature source Oral, resp. rate 18, height 5' 8\" (1.727 m), weight 173 lb 6.4 oz (78.7 kg), SpO2 99 %. Intake/Output Summary (Last 24 hours) at 3/3/2023 1449  Last data filed at 3/3/2023 1400  Gross per 24 hour   Intake 0 ml   Output 5 ml   Net -5 ml     PHYSICAL EXAM   Constitutional:  the patient is well developed and anxious  EENMT:  Sclera clear, pupils equal, oral mucosa moist  Respiratory: symmetric chest rise. CTA bilateral; on RA   Cardiovascular:  RRR without M,G,R. There is no lower extremity edema. Gastrointestinal: soft and non-tender; with positive bowel sounds. Musculoskeletal: warm without cyanosis. Normal muscle tone. Skin:  no jaundice or rashes, no wounds   Neurologic: symmetric strength, fluent speech  Psychiatric:  anxious, appropriate, oriented x 4    Imaging: I performed an independent interpretation of the patient's images.   CXR:    3/3        Recent Labs     03/03/23  0019   WBC 8.2   HGB 13.9   HCT 43.4         K 4.0      CO2 27   BUN 18   CREATININE 0.98   MG 2.5   BILITOT 0.3   AST 47*   ALT 64*   ALKPHOS 139*       Lab Results   Component Value Date/Time     03/03/2023 12:19 AM    K 4.0 03/03/2023 12:19 AM     03/03/2023 12:19 AM    CO2 27 03/03/2023 12:19 AM    BUN 18 03/03/2023 12:19 AM    CREATININE 0.98 03/03/2023 12:19 AM    GLUCOSE 135 03/03/2023 12:19 AM    CALCIUM 9.2 03/03/2023 12:19 AM      No results found for: BNP    ECHO: 03/03/23    TRANSTHORACIC ECHOCARDIOGRAM (TTE) COMPLETE (CONTRAST/BUBBLE/3D PRN) 03/03/2023 12:03 PM (Final)    Interpretation Summary    Left Ventricle: Normal left ventricular systolic function with a visually estimated EF of 60 - 65%. Left ventricle size is normal. Normal wall thickness. Normal wall motion. Normal diastolic function. Tricuspid Valve: The estimated RVSP is 21 mmHg. Technical qualifiers: Color flow Doppler was performed and pulse wave and/or continuous wave Doppler was performed. Signed by: Kentrell Johnson MD on 3/3/2023 12:03 PM    MICRO: No results for input(s): CULTURE in the last 72 hours. Assessment and Plan:  (Medical Decision Making)   Impression: 59 y.o. female who is a heavy smoker admitted for chest pain; LHC showed multivessel disease with plans for CABG. Has a history of untreated HLD and HTN. No history of COPD. Atherosclerotic heart disease of native coronary artery with unstable angina pectoris ([address]    Chest pain    Hyperlipidemia   Plan: plans for CABG next week; Dr. Ramy Sanchez has seen and orders started. Will be on heparin drip until surgery    Cigarette Smoker  Plan: cessation importance stressed- states she only smokes 4-5 total cigarettes a day; Wife also smokes in household  Very anxious and wanting to go outside to smoke. Gave encouragement. Can use PRNs for anxiety.    Plan for bedside spirometry  PRN emelyn  Can follow up with us in the office for [facility] after surgery recovery    Hypertension  Plan: has been untreated prior to admission; now stable with addition of BB    Elevated LFTs  Plan: pt reports history of fatty liver; ALT 64 and AST 47 on labs    Hypothyroid  Plan: on 100 mcg levothyroxine PTA         Full Code    Thank you very much for this referral.  We appreciate the opportunity to participate in this patient's care. Will follow along with above stated plan. In this split/shared evaluation I performed performed a medically appropriate history and exam, counseled and educated the patient and/or family member, ordered medications, tests or procedures, documented information in EMR, and coordinated care. which accounted for 20 minutes clinical time. EMILY Duran - NP    In this split/shared evaluation I performed reviewed the patients's H&P, available images, labs, cultures. , discussed case in detail with NPP, performed a medically appropriate history and exam, counseled and educated the patient and/or family member, ordered and/or reviewed medications, tests or procedures, documented information in EMR, independently interpreted images, and coordinated care. which accounted for 25 minutes clinical time. Impression:      59 y.o. female who is a smoker- > 30 pack year  admitted for chest pain; LHC showed multivessel disease with plans for CABG. Has a history of untreated HLD and HTN. Never been diagnosed with COPD, no ER visit ,admission for COPD exacerbation, never been given inhalers. Really denies SOB, chronic cough or wheezing   Bedsite spirometry not done but suspect if she does have COPD ,not significant enough as not symptomatic and never seek care for SOB   -added BD  Plans for CABG on Monday , stable for surgery, risk for postoperative pulmonary complications small  will follow up after      Sixto Carter MD

## 2023-03-03 NOTE — CARE COORDINATION
Patient admitted in Obs status with chest pain. [facility] scheduled. ECHO completed. CM scanned medical record for discharge needs. CM will remain available for any new needs. 03/03/23 8949   Service Assessment   History Provided By Medical Record   Primary Caregiver Self   Support Systems Spouse/Significant Other   Patient's Healthcare Decision Maker is: Legal Next of Jacinto 70   PCP Verified by CM Yes  (Dr Aden Trinh)   Last Visit to PCP Within last 6 months   Financial Resources Other (Comment)  (Commercial)   Social/Functional History   Receives Help From [employer] Car   Discharge Planning   Potential Assistance Needed N/A   DME Ordered? No   Potential Assistance Purchasing Medications No   Services At/After Discharge   Transition of Care Consult (CM Consult) Discharge [facility] [address] Discharge None    Resource Information Provided?  No   Mode of Transport at Discharge   (Car)   Confirm Follow Up Transport Self

## 2023-03-03 NOTE — Clinical Note
TRANSFER - OUT REPORT:     Verbal report given to: floor. Report consisted of patient's Situation, Background, Assessment and   Recommendations(SBAR). Opportunity for questions and clarification was provided. Patient transported with a Cardiac Cath Tech / Patient Care Tech.

## 2023-03-03 NOTE — PROGRESS NOTES
TRANSFER - IN REPORT:    Verbal report received from Javier Perez RN(name) on [employer]  being received from CCL(unit) for routine post-op      Report consisted of patients Situation, Background, Assessment and   Recommendations(SBAR). Information from the following report(s) Surgery Report was reviewed with the receiving nurse. Opportunity for questions and clarification was provided.       Assessment completed upon patients arrival to unit and care assume

## 2023-03-03 NOTE — CONSULTS
[address] [facility]  Cecilio Raymundo. MD Ren Reyes. MD Alice Matthews S, KERRI Palafox       [id number]  3/3/2023        1960        CHIEF COMPLAINT:  CP    HISTORY OF PRESENT ILLNESS:  The patient is a 59 y.o. female who is seen for evaluation of MVCAD, she presented with unstable angina, enzymes negative, LHC MVCAD, good LV, heavy smoker, denies rest pain. Past Medical History:   Diagnosis Date    Hyperlipidemia     Hypothyroid        Past Surgical History:   Procedure Laterality Date    CHOLECYSTECTOMY      GYN      hysterectomy       Family History   Problem Relation Age of Onset    No Known Problems Sister     No Known Problems Brother        Social History     Socioeconomic History    Marital status:      Spouse name: Not on file    Number of children: Not on file    Years of education: Not on file    Highest education level: Not on file   Occupational History    Not on file   Tobacco Use    Smoking status: Every Day     Packs/day: 1.00     Types: Cigarettes    Smokeless tobacco: Never   Substance and Sexual Activity    Alcohol use: No    Drug use: No    Sexual activity: Not on file   Other Topics Concern    Not on file   Social History Narrative    Not on file     Social Determinants of Health     Financial Resource Strain: Not on file   Food Insecurity: Not on file   Transportation Needs: Not on file   Physical Activity: Not on file   Stress: Not on file   Social Connections: Not on file   Intimate Partner Violence: Not on file   Housing Stability: Not on file       Allergies   Allergen Reactions    Pcn [Penicillins] Nausea And Vomiting       No current facility-administered medications on file prior to encounter. Current Outpatient Medications on File Prior to Encounter   Medication Sig Dispense Refill    levothyroxine (SYNTHROID) 100 MCG tablet Take 100 mcg by mouth Daily         REVIEW OF SYSTEMS:  GENERAL:  No weight loss. No fever. EYES:  No diplopia. No vision loss. ENTM:  No hearing loss. No trouble swallowing. No hoarseness. CARDIAC:  See present illness. PULMONARY:  Denies asthma or chronic pulmonary disease. GI:  No change in bowel habits. No bleeding. :  No dysuria. No history of renal stones or renal insufficiency. MS:  Denies osteoarthritis. NEURO:  No stroke or seizure disorder  HEME/LYMPHATIC:  No history of bleeding tendencies. PSYCHIATRIC:  No history of depression. PHYSICAL EXAMINATION:  GENERAL APPEARANCE:  Well developed. Well nourished. Alert, cooperative and oriented times three. HEENT:  Normocephalic. Extraocular muscles are intact. No scleral icterus. NECK/LYMPHATIC:  Supple with no carotic bruits. No jugular venous distention. LUNGS: Lungs are clear to auscultation. HEART:  Heart is regular rate and rhythm without a murmur. ABDOMEN:  Soft and non-tender. SKIN:  No rashes, cyanosis, jaundice, ecchymoses or evidence of skin breakdown present. EXTREMITIES:  Full range of motion. No deformity. No peripheral edema. VASCULAR:  Pulses are full and equal at the radial arteries and the popliteal arteries bilaterally. There is no venous stasis disease. NEURO:  Grossly intact. IMPRESSION:  @DXC@    PLAN:  I discussed in detail CAD, both the alternatives to and risks and benefits of CABG. Patient saw our teaching video. Risk  include but are not limited to:  Bleeding  Infection including mediastinitis  Myocardial infarction  Stroke  Potential death  Needs CABG, all questions answered, plan Monday am      No name on file.

## 2023-03-03 NOTE — Clinical Note
TRANSFER - IN REPORT:     Verbal report received from: floor nurse. Report consisted of patient's Situation, Background, Assessment and   Recommendations(SBAR). Opportunity for questions and clarification was provided. Assessment completed upon patient's arrival to unit and care assumed. Patient transported with a Registered Nurse and [address] [place] [address] / [facility].

## 2023-03-03 NOTE — PROGRESS NOTES
TRANSFER - OUT REPORT:    Verbal report given to Eliane Napier RN on [employer]  being transferred to [facility] for routine progression of patient care       Report consisted of patient's Situation, Background, Assessment and   Recommendations(SBAR). Information from the following report(s) Nurse Handoff Report was reviewed with the receiving nurse.     C with Dr Thompson Reveal  No interventions  CV Surgery consult  R Radial  TR band at 12mL  Versed 2mg  Fentanyl 25mcg  Heparin 5000 units

## 2023-03-03 NOTE — PROGRESS NOTES
TRANSFER - OUT REPORT:    Verbal report given to ANGIE Wong on [employer] being transferred to [phone] for routine post-op       Report consisted of patients Situation, Background, Assessment and Recommendations (SBAR). Information from the following report(s) SBAR, Procedure Summary, Intake/Output, and Recent Results was reviewed with the receiving nurse. Opportunity for questions and clarification was provided. R [facility] site C/D/I.

## 2023-03-03 NOTE — H&P
[facility] Cardiology History & Physical      Date of  Admission: 3/3/2023  5:58 AM     Primary Care Physician: Dr. Heron Hernandez  Primary Cardiologist: none  Admitting Physician: Dr. Michael Quiñones    CC: chest pain     HPI:  Jessica Moctezuma is a 59 y.o. female with past medical history of tobacco abuse, HLD, and hypothyroidism who presented to the ER at [facility] with complaints of chest pain. Patient reports that she developed her symptoms around 2pm yesterday while at work. She describes her pain as pressure/heartburn-like that goes across her chest with radiation into her left arm, back and neck. Denies shortness of breath, nausea, vomiting or diaphoresis. Reports taking 81mg ASA x 6 with no relief. She is currently chest pain free. Upon arrival to the ER, EKG shows SR with diffuse inverted T waves. Serial cardiac enzymes are negative x 2.       Past Medical History:   Diagnosis Date    Hyperlipidemia     Hypothyroid       Past Surgical History:   Procedure Laterality Date    CHOLECYSTECTOMY      GYN      hysterectomy       Allergies   Allergen Reactions    Pcn [Penicillins] Nausea And Vomiting      Social History     Socioeconomic History    Marital status:      Spouse name: Not on file    Number of children: Not on file    Years of education: Not on file    Highest education level: Not on file   Occupational History    Not on file   Tobacco Use    Smoking status: Every Day     Packs/day: 1.00     Types: Cigarettes    Smokeless tobacco: Never   Substance and Sexual Activity    Alcohol use: No    Drug use: No    Sexual activity: Not on file   Other Topics Concern    Not on file   Social History Narrative    Not on file     Social Determinants of Health     Financial Resource Strain: Not on file   Food Insecurity: Not on file   Transportation Needs: Not on file   Physical Activity: Not on file   Stress: Not on file   Social Connections: Not on file   Intimate Partner Violence: Not on file   Housing Stability: Not on file     Family History   Problem Relation Age of Onset    No Known Problems Sister     No Known Problems Brother         Current Facility-Administered Medications   Medication Dose Route Frequency    0.9 % sodium chloride infusion   IntraVENous Continuous    sodium chloride flush 0.9 % injection 5-40 mL  5-40 mL IntraVENous 2 times per day    sodium chloride flush 0.9 % injection 5-40 mL  5-40 mL IntraVENous PRN    ondansetron (ZOFRAN) injection 4 mg  4 mg IntraVENous Q4H PRN    acetaminophen (TYLENOL) tablet 650 mg  650 mg Oral Q6H PRN    Or    acetaminophen (TYLENOL) suppository 650 mg  650 mg Rectal Q6H PRN    polyethylene glycol (GLYCOLAX) packet 17 g  17 g Oral Daily PRN    aspirin chewable tablet 81 mg  81 mg Oral Daily    nitroGLYCERIN (NITROSTAT) SL tablet 0.4 mg  0.4 mg SubLINGual Q5 Min PRN    potassium chloride (KLOR-CON M) extended release tablet 40 mEq  40 mEq Oral PRN    Or    potassium bicarb-citric acid (EFFER-K) effervescent tablet 40 mEq  40 mEq Oral PRN    Or    potassium chloride 10 mEq/100 mL IVPB (Peripheral Line)  10 mEq IntraVENous PRN    magnesium sulfate 2000 mg in 50 mL IVPB premix  2,000 mg IntraVENous PRN    metoprolol tartrate (LOPRESSOR) tablet 25 mg  25 mg Oral BID    nitroglycerin (NITRO-BID) 2 % ointment 1 inch  1 inch Topical Q6H PRN       Review of Systems    Review of Systems   Constitutional: Negative. HENT: Negative. Eyes: Negative. Cardiovascular:  Positive for chest pain. Respiratory: Negative. Endocrine: Negative. Hematologic/Lymphatic: Negative. Skin: Negative. Musculoskeletal:  Positive for back pain.        + left arm and neck pain   Genitourinary: Negative. Neurological: Negative. Psychiatric/Behavioral: Negative.            Subjective:   BP (!) 164/76   Pulse 70   Temp 97.7 °F (36.5 °C) (Oral)   Resp 16   Ht 5' 8\" (1.727 m)   Wt 173 lb 6.4 oz (78.7 kg)   SpO2 99%   BMI 26.37 kg/m²   Physical Exam  HENT:      Mouth/Throat:      Mouth: Mucous membranes are moist.   Eyes:      Pupils: Pupils are equal, round, and reactive to light. Cardiovascular:      Rate and Rhythm: Normal rate and regular rhythm. Heart sounds: Normal heart sounds. Pulmonary:      Breath sounds: Normal breath sounds. Abdominal:      General: Bowel sounds are normal.   Musculoskeletal:         General: No swelling. Skin:     General: Skin is warm. Neurological:      Mental Status: She is alert and oriented to person, place, and time.    Psychiatric:         Mood and Affect: Mood normal.        Cardiographics  Telemetry: normal sinus rhythm  ECG: normal sinus rhythm  Echocardiogram:  ordered    Labs:   Recent Results (from the past 24 hour(s))   EKG 12 Lead    Collection Time: 03/03/23 12:00 AM   Result Value Ref Range    Ventricular Rate 76 BPM    Atrial Rate 76 BPM    P-R Interval 173 ms    QRS Duration 98 ms    Q-T Interval 368 ms    QTc Calculation (Bazett) 414 ms    P Axis 55 degrees    R Axis 46 degrees    T Axis 219 degrees    Diagnosis Sinus rhythm    CBC    Collection Time: 03/03/23 12:19 AM   Result Value Ref Range    WBC 8.2 4.3 - 11.1 K/uL    RBC 4.64 4.05 - 5.20 M/uL    Hemoglobin 13.9 11.7 - 15.4 g/dL    Hematocrit 43.4 35.8 - 46.3 %    MCV 93.5 82.0 - 102.0 FL    MCH 30.0 26.1 - 32.9 PG    MCHC 32.0 31.4 - 35.0 g/dL    RDW 14.6 11.9 - 14.6 %    Platelets 634 443 - 182 K/uL    MPV 10.8 9.4 - 12.3 FL    nRBC 0.00 0.0 - 0.2 K/uL   Comprehensive Metabolic Panel    Collection Time: 03/03/23 12:19 AM   Result Value Ref Range    Sodium 139 133 - 143 mmol/L    Potassium 4.0 3.5 - 5.1 mmol/L    Chloride 101 98 - 107 mmol/L    CO2 27 21 - 32 mmol/L    Anion Gap 11 2 - 11 mmol/L    Glucose 135 (H) 65 - 100 mg/dL    BUN 18 7.0 - 18.0 MG/DL    Creatinine 0.98 0.6 - 1.0 MG/DL    Est, Glom Filt Rate >60 >60 ml/min/1.73m2    Calcium 9.2 8.3 - 10.4 MG/DL    Total Bilirubin 0.3 0.2 - 1.1 MG/DL    ALT 64 (H) 13.0 - 61.0 U/L    AST 47 (H) 15 - 37 U/L    Alk Phosphatase 139 (H) 45.0 - 117.0 U/L    Total Protein 7.9 6.4 - 8.2 g/dL    Albumin 5.1 (H) 3.2 - 4.6 g/dL    Globulin 2.8 2.8 - 4.5 g/dL    Albumin/Globulin Ratio 1.8 (H) 0.4 - 1.6     Troponin T    Collection Time: 03/03/23 12:19 AM   Result Value Ref Range    Troponin T 9.2 0 - 14 ng/L   Magnesium    Collection Time: 03/03/23 12:19 AM   Result Value Ref Range    Magnesium 2.5 1.2 - 2.6 mg/dL   Lipase    Collection Time: 03/03/23 12:19 AM   Result Value Ref Range    Lipase 29 13 - 60 U/L   D-Dimer, Quantitative    Collection Time: 03/03/23  1:00 AM   Result Value Ref Range    D-Dimer, Quant 0.46 <0.56 ug/ml(FEU)   Troponin T    Collection Time: 03/03/23  2:23 AM   Result Value Ref Range    Troponin T 9.5 0 - 14 ng/L       Patient has been seen and examined by Dr. Rafael Hidalgo and he agrees with the following assessment and plan:     Assessment/Plan:        Chest pain -- admit to telemetry for observation. Start ASA and BB on admission. NPO with IV hydration. Plan for LHC with possible PCI later today. Check echocardiogram and lipid panel      Hypertension -- not on medications as outpatient. Start BB therapy. Monitor BP closely. Titrate medications as needed. Hypothyroid -- continued home Synthroid dose      Tobacco abuse -- importance of cessation discussed and encouraged. Elevated LFTs -- patient reports fatty liver history      HLD -- states that she has taken multiple medications over the years with no improvement. Check lipid panel.         EMILY Joseph - CNP  3/3/2023 6:26 AM

## 2023-03-03 NOTE — ED NOTES
TRANSFER - OUT REPORT:    Verbal report given to [address], RN on [employer]  being transferred to  [phone] for routine progression of patient care       Report consisted of patient's Situation, Background, Assessment and   Recommendations(SBAR). Information from the following report(s) Nurse Handoff Report, ED Encounter Summary, ED SBAR, [facility], Cardiac Rhythm Normal Sinus and Neuro Assessment was reviewed with the receiving nurse. [place]el Assessment: No data recorded  Lines:   Peripheral IV 03/03/23 Right Antecubital (Active)   Site Assessment Clean, dry & intact 03/03/23 0018   Line Status Blood return noted; Flushed 03/03/23 0018   Line Care Line pulled back 03/03/23 0018   Phlebitis Assessment No symptoms 03/03/23 0018   Infiltration Assessment 0 03/03/23 0018   Alcohol Cap Used No 03/03/23 0018   Dressing Status New dressing applied;Clean, dry & intact 03/03/23 0018   Dressing Type Transparent 03/03/23 0018   Dressing Intervention New 03/03/23 0018        Opportunity for questions and clarification was provided.       Patient transported with:  Monitor with Thao Cloud RN  03/03/23 [id number]

## 2023-03-03 NOTE — ED TRIAGE NOTES
Pt to the ED from home with c/o of left sided chest pain/shoulder that started today while she was at work. Pt states that the pain stop when she went to a resting position. Pt states that she took 6-81mg asa for pain with no relief. Pt states that tonight the pain got worse and no is constant.  No SOB

## 2023-03-03 NOTE — PROGRESS NOTES
TRANSFER - IN REPORT:    Verbal report received from [address] on [employer]  being received from [facility] ED for routine progression of patient care      Report consisted of patient's Situation, Background, Assessment and   Recommendations(SBAR). Information from the following report(s) Nurse Handoff Report, Index, ED Encounter Summary, ED SBAR, Adult Overview, Intake/Output, MAR, and Recent Results was reviewed with the receiving nurse. Opportunity for questions and clarification was provided. Assessment completed upon patient's arrival to unit and care assumed. Dual skin assessment completed with Ivett Avila RN. Sacrum and heels intact with no redness. Raised callus noted on L knee. Scattered scars and bruising present. No additional abnormalities noted.

## 2023-03-03 NOTE — PROGRESS NOTES
TRANSFER - IN REPORT:    Verbal report received from [facility] on [employer] being received from  W[facility] Dupree) for routine progression of care. Report consisted of patients Situation, Background, Assessment and Recommendations(SBAR). Information from the following report(s) Procedure Summary and Cardiac Rhythm Sinus Rhythm  was reviewed. Opportunity for questions and clarification was provided. Assessment completed upon patients arrival to unit and care assumed. Patient received to room 327. Patient connected to monitor and assessment completed. Pt returns to unit with right radial cath site C/D/I with no hematoma present. Continuous blood pressures initiated. Regular rate and rhythm, Heart sounds S1 S2 present, no murmurs, rubs or gallops

## 2023-03-03 NOTE — ED PROVIDER NOTES
[employer] Emergency Department Provider Note                   PCP:                Yannick Cochran MD               Age: 59 y.o. Sex: female       Martha Viramontes is a 59 y.o. female who presents to the Emergency Department with chief complaint of    Chief Complaint   Patient presents with    Chest Pain      Patient presents for evaluation of chest pain. She states around 2 PM today she developed left-sided chest pain. She describes it as a sharp sensation that has been intermittent and severe at times. She states it radiates down her left arm and is associated with shortness of breath. She states it is worse with certain movements as well as exertion and better when she sits down. Patient has never had this pain before. Patient does have high cholesterol and smokes. She denies a family history of coronary disease. She states she has never had a stress test or heart catheterization. The history is provided by the patient. All other systems reviewed and are negative. Review of Systems   Constitutional:  Negative for chills, fatigue and fever. HENT:  Negative for congestion and sore throat. Respiratory:  Positive for shortness of breath. Negative for cough. Cardiovascular:  Positive for chest pain. Negative for palpitations and leg swelling. Gastrointestinal:  Negative for abdominal pain, nausea and vomiting. Genitourinary:  Negative for flank pain. Musculoskeletal:  Negative for back pain and neck pain. Skin:  Negative for rash. Neurological:  Negative for dizziness and headaches.      Past Medical History:   Diagnosis Date    Hyperlipidemia     Hypothyroid         Past Surgical History:   Procedure Laterality Date    CHOLECYSTECTOMY      GYN      hysterectomy        Family History   Problem Relation Age of Onset    No Known Problems Sister     No Known Problems Brother         Social History     Socioeconomic History    Marital status:    Tobacco Use    Smoking status: Every Day     Packs/day: 1.00     Types: Cigarettes    Smokeless tobacco: Never   Substance and Sexual Activity    Alcohol use: No    Drug use: No        Allergies: Pcn [penicillins]    Previous Medications    No medications on file        Vitals signs and nursing note reviewed. Patient Vitals for the past 4 hrs:   Pulse Resp BP SpO2   03/03/23 0429 69 20 131/77 96 %   03/03/23 0330 68 -- 135/66 93 %   03/03/23 0315 65 -- 121/66 94 %   03/03/23 0230 70 16 135/61 95 %   03/03/23 0215 68 -- 124/66 96 %   03/03/23 0145 67 -- 110/61 95 %   03/03/23 0117 69 17 (!) 144/73 98 %          Physical Exam  Vitals and nursing note reviewed. Constitutional:       Appearance: Normal appearance. HENT:      Head: Normocephalic and atraumatic. Cardiovascular:      Rate and Rhythm: Normal rate and regular rhythm. Pulses: Normal pulses. Heart sounds: Normal heart sounds. Pulmonary:      Effort: Pulmonary effort is normal.      Breath sounds: Normal breath sounds. Chest:      Chest wall: No tenderness. Abdominal:      General: Abdomen is flat. Bowel sounds are normal.      Palpations: Abdomen is soft. Tenderness: There is no abdominal tenderness. Musculoskeletal:         General: No tenderness. Cervical back: Normal range of motion and neck supple. No tenderness. Thoracic back: No tenderness. Right lower leg: No edema. Left lower leg: No edema. Skin:     General: Skin is warm and dry. Neurological:      General: No focal deficit present. Mental Status: She is alert and oriented to person, place, and time.         Orders Placed This Encounter   Procedures    XR CHEST PORTABLE    CBC    Comprehensive Metabolic Panel    Troponin T    Magnesium    Lipase    D-Dimer, Quantitative    Cardiac Monitor    Pulse Oximetry    EKG 12 Lead    Saline lock IV       Procedures    ED EKG Interpretation  EKG was interpreted in the absence of a cardiologist.    Rate: Rate: Normal  EKG Interpretation: EKG Interpretation: sinus rhythm  ST Segments: Nonspecific ST segments - NO STEMI  T wave inversions diffusely, no previous to compare with    Results Include:    Recent Results (from the past 24 hour(s))   CBC    Collection Time: 03/03/23 12:19 AM   Result Value Ref Range    WBC 8.2 4.3 - 11.1 K/uL    RBC 4.64 4.05 - 5.20 M/uL    Hemoglobin 13.9 11.7 - 15.4 g/dL    Hematocrit 43.4 35.8 - 46.3 %    MCV 93.5 82.0 - 102.0 FL    MCH 30.0 26.1 - 32.9 PG    MCHC 32.0 31.4 - 35.0 g/dL    RDW 14.6 11.9 - 14.6 %    Platelets 312 803 - 489 K/uL    MPV 10.8 9.4 - 12.3 FL    nRBC 0.00 0.0 - 0.2 K/uL   Comprehensive Metabolic Panel    Collection Time: 03/03/23 12:19 AM   Result Value Ref Range    Sodium 139 133 - 143 mmol/L    Potassium 4.0 3.5 - 5.1 mmol/L    Chloride 101 98 - 107 mmol/L    CO2 27 21 - 32 mmol/L    Anion Gap 11 2 - 11 mmol/L    Glucose 135 (H) 65 - 100 mg/dL    BUN 18 7.0 - 18.0 MG/DL    Creatinine 0.98 0.6 - 1.0 MG/DL    Est, Glom Filt Rate >60 >60 ml/min/1.73m2    Calcium 9.2 8.3 - 10.4 MG/DL    Total Bilirubin 0.3 0.2 - 1.1 MG/DL    ALT 64 (H) 13.0 - 61.0 U/L    AST 47 (H) 15 - 37 U/L    Alk Phosphatase 139 (H) 45.0 - 117.0 U/L    Total Protein 7.9 6.4 - 8.2 g/dL    Albumin 5.1 (H) 3.2 - 4.6 g/dL    Globulin 2.8 2.8 - 4.5 g/dL    Albumin/Globulin Ratio 1.8 (H) 0.4 - 1.6     Troponin T    Collection Time: 03/03/23 12:19 AM   Result Value Ref Range    Troponin T 9.2 0 - 14 ng/L   Magnesium    Collection Time: 03/03/23 12:19 AM   Result Value Ref Range    Magnesium 2.5 1.2 - 2.6 mg/dL   Lipase    Collection Time: 03/03/23 12:19 AM   Result Value Ref Range    Lipase 29 13 - 60 U/L   D-Dimer, Quantitative    Collection Time: 03/03/23  1:00 AM   Result Value Ref Range    D-Dimer, Quant 0.46 <0.56 ug/ml(FEU)   Troponin T    Collection Time: 03/03/23  2:23 AM   Result Value Ref Range    Troponin T 9.5 0 - 14 ng/L          XR CHEST PORTABLE   Final Result      1. Borderline cardiomegaly.  No overt failure. Donte Mares M.D.    3/3/2023 12:38:00 AM                         ED Course as of 03/03/23 0434   Fri Mar 03, 2023   0308 Patient is resting comfortably in bed. She states she is intermittently having some chest pain but none currently. I explained the results and plan. We will contact cardiology about patient's symptoms. [CW]   1077 I messaged with Dr Clive Cancino of cardiology. He accepted patient in transfer to the [facility]. [CW]      ED Course User Index  [CW] Jenelle Pelaez MD       MDM  Number of Diagnoses or Management Options  Chest pain, unspecified type  Diagnosis management comments: Patient with a history of high cholesterol and tobacco use presents for exertional chest pain that started yesterday afternoon. It is worse with exertion and better with rest and does radiate to her left shoulder. Is associated with shortness of breath. Patient EKG shows normal sinus rhythm with T wave inversions. There is no previous EKG to compare with. Patient's D-dimer is normal and PE excluded. Patient's serial troponins were normal and ACS was ruled out. Patient's chest x-ray showed no acute findings. Patient does have risk factors for coronary artery disease and cardiology was consulted. Patient had mild episodes of chest pain intermittently in the ED but it was controlled. She has taken multiple baby aspirin throughout the day. Patient was accepted and transferred to the [facility].        MEDICAL DECISION MAKING  Complexity of Problems Addressed:  1 or more acute illnesses that pose a threat to life or bodily function. Data Reviewed and Analyzed:  Category 1:     I ordered each unique test.  I reviewed the results of each unique test.        Category 2:     I independently ordered and reviewed the EKG. I independently ordered and reviewed the labs    Category 3: Discussion of management or test interpretation.   Case was discussed with cardiologist        Explanation: The diagnosis and plan as well as the results of any testing and treatments today in the department were communicated to the patient and/or their family/caregiver (if present). The patient/caregiver verbalized understanding and realize that they are being admitted to the hospital for further evaluation and treatment. All questions were answered. ICD-10-CM    1. Chest pain, unspecified type  R07.9           DISPOSITION Decision To Transfer 03/03/2023 04:05:27 AM       Voice dictation software was used during the making of this note. This software is not perfect and grammatical and other typographical errors may be present. This note has not been completely proofread for errors.      Danial Bell MD  03/03/23 0160 [facility] MD Silvia  03/03/23 1082

## 2023-03-03 NOTE — PROGRESS NOTES
Patient with severe multivessel coronary artery disease. Plan for bypass on Monday. Start heparin after radial band removed and hemostasis achieved. Monitor closely over the weekend.     Trevin Samson MD

## 2023-03-04 LAB
ANION GAP SERPL CALC-SCNC: 2 MMOL/L (ref 2–11)
APPEARANCE UR: CLEAR
BACTERIA SPEC CULT: NORMAL
BILIRUB UR QL: NEGATIVE
BUN SERPL-MCNC: 14 MG/DL (ref 8–23)
CALCIUM SERPL-MCNC: 8.5 MG/DL (ref 8.3–10.4)
CHLORIDE SERPL-SCNC: 110 MMOL/L (ref 101–110)
CHOLEST SERPL-MCNC: 286 MG/DL
CO2 SERPL-SCNC: 26 MMOL/L (ref 21–32)
COLOR UR: NORMAL
CREAT SERPL-MCNC: 0.7 MG/DL (ref 0.6–1)
ERYTHROCYTE [DISTWIDTH] IN BLOOD BY AUTOMATED COUNT: 14.8 % (ref 11.9–14.6)
EST. AVERAGE GLUCOSE BLD GHB EST-MCNC: 120 MG/DL
GLUCOSE SERPL-MCNC: 108 MG/DL (ref 65–100)
GLUCOSE UR STRIP.AUTO-MCNC: NEGATIVE MG/DL
HBA1C MFR BLD: 5.8 % (ref 4.8–5.6)
HCT VFR BLD AUTO: 40.8 % (ref 35.8–46.3)
HDLC SERPL-MCNC: 39 MG/DL (ref 40–60)
HDLC SERPL: 7.3
HGB BLD-MCNC: 13.1 G/DL (ref 11.7–15.4)
HGB UR QL STRIP: NEGATIVE
INR PPP: 1
KETONES UR QL STRIP.AUTO: NEGATIVE MG/DL
LDLC SERPL CALC-MCNC: 191.8 MG/DL
LEUKOCYTE ESTERASE UR QL STRIP.AUTO: NEGATIVE
MAGNESIUM SERPL-MCNC: 2.8 MG/DL (ref 1.8–2.4)
MCH RBC QN AUTO: 30.5 PG (ref 26.1–32.9)
MCHC RBC AUTO-ENTMCNC: 32.1 G/DL (ref 31.4–35)
MCV RBC AUTO: 95.1 FL (ref 82–102)
NITRITE UR QL STRIP.AUTO: NEGATIVE
NRBC # BLD: 0 K/UL (ref 0–0.2)
PH UR STRIP: 6 (ref 5–9)
PLATELET # BLD AUTO: 205 K/UL (ref 150–450)
PMV BLD AUTO: 10.7 FL (ref 9.4–12.3)
POTASSIUM SERPL-SCNC: 3.9 MMOL/L (ref 3.5–5.1)
PROT UR STRIP-MCNC: NEGATIVE MG/DL
PROTHROMBIN TIME: 13.5 SEC (ref 12.6–14.3)
RBC # BLD AUTO: 4.29 M/UL (ref 4.05–5.2)
SERVICE CMNT-IMP: NORMAL
SODIUM SERPL-SCNC: 138 MMOL/L (ref 133–143)
SP GR UR REFRACTOMETRY: 1.02 (ref 1–1.02)
TRIGL SERPL-MCNC: 276 MG/DL (ref 35–150)
UFH PPP CHRO-ACNC: 0.18 IU/ML (ref 0.3–0.7)
UFH PPP CHRO-ACNC: 0.31 IU/ML (ref 0.3–0.7)
UFH PPP CHRO-ACNC: 0.41 IU/ML (ref 0.3–0.7)
UROBILINOGEN UR QL STRIP.AUTO: 0.2 EU/DL (ref 0.2–1)
VLDLC SERPL CALC-MCNC: 55.2 MG/DL (ref 6–23)
WBC # BLD AUTO: 7.1 K/UL (ref 4.3–11.1)

## 2023-03-04 PROCEDURE — 85520 HEPARIN ASSAY: CPT

## 2023-03-04 PROCEDURE — 6360000002 HC RX W HCPCS: Performed by: PHYSICIAN ASSISTANT

## 2023-03-04 PROCEDURE — 81003 URINALYSIS AUTO W/O SCOPE: CPT

## 2023-03-04 PROCEDURE — 87641 MR-STAPH DNA AMP PROBE: CPT

## 2023-03-04 PROCEDURE — 83036 HEMOGLOBIN GLYCOSYLATED A1C: CPT

## 2023-03-04 PROCEDURE — 83735 ASSAY OF MAGNESIUM: CPT

## 2023-03-04 PROCEDURE — 6370000000 HC RX 637 (ALT 250 FOR IP): Performed by: THORACIC SURGERY (CARDIOTHORACIC VASCULAR SURGERY)

## 2023-03-04 PROCEDURE — 6370000000 HC RX 637 (ALT 250 FOR IP): Performed by: NURSE PRACTITIONER

## 2023-03-04 PROCEDURE — 36415 COLL VENOUS BLD VENIPUNCTURE: CPT

## 2023-03-04 PROCEDURE — 6370000000 HC RX 637 (ALT 250 FOR IP): Performed by: INTERNAL MEDICINE

## 2023-03-04 PROCEDURE — 80048 BASIC METABOLIC PNL TOTAL CA: CPT

## 2023-03-04 PROCEDURE — 80061 LIPID PANEL: CPT

## 2023-03-04 PROCEDURE — 96376 TX/PRO/DX INJ SAME DRUG ADON: CPT

## 2023-03-04 PROCEDURE — 2580000003 HC RX 258: Performed by: NURSE PRACTITIONER

## 2023-03-04 PROCEDURE — 85027 COMPLETE CBC AUTOMATED: CPT

## 2023-03-04 PROCEDURE — 1100000000 HC RM PRIVATE

## 2023-03-04 PROCEDURE — 85610 PROTHROMBIN TIME: CPT

## 2023-03-04 RX ORDER — AMIODARONE HYDROCHLORIDE 200 MG/1
600 TABLET ORAL 2 TIMES DAILY
Status: DISCONTINUED | OUTPATIENT
Start: 2023-03-05 | End: 2023-03-07

## 2023-03-04 RX ADMIN — ROSUVASTATIN CALCIUM 40 MG: 20 TABLET, COATED ORAL at 21:07

## 2023-03-04 RX ADMIN — HEPARIN SODIUM 2000 UNITS: 1000 INJECTION INTRAVENOUS; SUBCUTANEOUS at 00:22

## 2023-03-04 RX ADMIN — ASPIRIN 81 MG: 81 TABLET ORAL at 09:15

## 2023-03-04 RX ADMIN — HEPARIN SODIUM 16 UNITS/KG/HR: 10000 INJECTION, SOLUTION INTRAVENOUS at 14:30

## 2023-03-04 RX ADMIN — HEPARIN SODIUM 2000 UNITS: 1000 INJECTION INTRAVENOUS; SUBCUTANEOUS at 14:25

## 2023-03-04 RX ADMIN — SODIUM CHLORIDE, PRESERVATIVE FREE 5 ML: 5 INJECTION INTRAVENOUS at 09:21

## 2023-03-04 RX ADMIN — METOPROLOL TARTRATE 12.5 MG: 25 TABLET, FILM COATED ORAL at 21:06

## 2023-03-04 RX ADMIN — LEVOTHYROXINE SODIUM 100 MCG: 0.1 TABLET ORAL at 06:00

## 2023-03-04 RX ADMIN — METOPROLOL TARTRATE 12.5 MG: 25 TABLET, FILM COATED ORAL at 09:35

## 2023-03-04 ASSESSMENT — PAIN SCALES - GENERAL
PAINLEVEL_OUTOF10: 0

## 2023-03-04 NOTE — PROGRESS NOTES
Blood Consent signed and placed on chart. Pt has viewed pre-op video. Education booklet given to pt. Pt demonstrated proper use of Incentive Spirometry and encouraged to use hourly.

## 2023-03-04 NOTE — PROGRESS NOTES
Advance Care Planning     Advance Care Planning Inpatient Note  Spiritual Care Department    Today's Date: 3/4/2023  Unit: [facility] 3 TELEMETRY    Received request from patient. Upon review of chart and communication with care team, patient's decision making abilities are not in question. . Patient was/were present in the room during visit.     Goals of ACP Conversation:  Discuss advance care planning documents    Health Care Decision Makers:       Primary Decision Maker: Mary Whitmore - [phone]  Summary:  No Decision Maker named by patient at this time    Advance Care Planning Documents (Patient Wishes):  Healthcare Power of /Advance Directive Appointment of Health Care Agent     Assessment:      Interventions:  Encouraged ongoing ACP conversation with future decision makers and loved ones    Care Preferences Communicated:   No    Outcomes/Plan:  ACP Discussion: Postponed    Electronically signed by Chaplain Peter on 3/4/2023 at 10:06 AM

## 2023-03-04 NOTE — PROGRESS NOTES
[facility] CARDIOLOGY PROGRESS NOTE           3/4/2023 7:20 AM    Admit Date: 3/3/2023      Subjective:   No cp or sob    bjective:      Vitals:    03/04/23 0035 03/04/23 0036 03/04/23 0459 03/04/23 0601   BP: (!) 94/57 (!) 94/57 110/75    Pulse:  60 58    Resp:   22    Temp:   97.9 °F (36.6 °C)    TempSrc:   Oral    SpO2:   95%    Weight:    172 lb 14.4 oz (78.4 kg)   Height:           Physical Exam:  General-No Acute Distress  Neck- supple, no JVD  CV- regular rate and rhythm no MRG  Lung- clear bilaterally  Abd- soft, nontender, nondistended  Ext- no edema bilaterally.   Skin- warm and dry    Data Review:   Recent Labs     03/03/23  0019 03/03/23  1606 03/04/23  0608     --  138   K 4.0  --  3.9   MG 2.5  --  2.8*   BUN 18  --  14   WBC 8.2 7.8 7.1   HGB 13.9 13.5 13.1   HCT 43.4 43.1 40.8    207 205   INR  --  0.9 1.0   CHOL  --   --  286*   HDL  --   --  39*       Assessment/Plan:     Uap  Smoker  Hyperlipidemia    ///    For cabg Monday  Stop order for IVF         Aiden Hernandez MD  3/4/2023 7:20 AM

## 2023-03-04 NOTE — PROGRESS NOTES
Noted patient is having a CABG Monday    Pt was calm    Very receptive to     Family was present      Provided supportive presence to all    No distress noted    Pt talked about her work ( [employer])    Sakshi unknown - had prayer together        Assured patient if she would like prayer before surgery we are available

## 2023-03-05 ENCOUNTER — ANESTHESIA EVENT (OUTPATIENT)
Dept: SURGERY | Age: 63
End: 2023-03-05
Payer: COMMERCIAL

## 2023-03-05 LAB
ERYTHROCYTE [DISTWIDTH] IN BLOOD BY AUTOMATED COUNT: 14.6 % (ref 11.9–14.6)
HCT VFR BLD AUTO: 40.4 % (ref 35.8–46.3)
HGB BLD-MCNC: 12.9 G/DL (ref 11.7–15.4)
HISTORY CHECK: NORMAL
MCH RBC QN AUTO: 30.4 PG (ref 26.1–32.9)
MCHC RBC AUTO-ENTMCNC: 31.9 G/DL (ref 31.4–35)
MCV RBC AUTO: 95.3 FL (ref 82–102)
NRBC # BLD: 0 K/UL (ref 0–0.2)
PLATELET # BLD AUTO: 208 K/UL (ref 150–450)
PMV BLD AUTO: 11.1 FL (ref 9.4–12.3)
RBC # BLD AUTO: 4.24 M/UL (ref 4.05–5.2)
UFH PPP CHRO-ACNC: 0.41 IU/ML (ref 0.3–0.7)
WBC # BLD AUTO: 6.4 K/UL (ref 4.3–11.1)

## 2023-03-05 PROCEDURE — 99231 SBSQ HOSP IP/OBS SF/LOW 25: CPT | Performed by: INTERNAL MEDICINE

## 2023-03-05 PROCEDURE — 36415 COLL VENOUS BLD VENIPUNCTURE: CPT

## 2023-03-05 PROCEDURE — 6370000000 HC RX 637 (ALT 250 FOR IP): Performed by: NURSE PRACTITIONER

## 2023-03-05 PROCEDURE — 6370000000 HC RX 637 (ALT 250 FOR IP): Performed by: PHYSICIAN ASSISTANT

## 2023-03-05 PROCEDURE — 86923 COMPATIBILITY TEST ELECTRIC: CPT

## 2023-03-05 PROCEDURE — 6370000000 HC RX 637 (ALT 250 FOR IP): Performed by: THORACIC SURGERY (CARDIOTHORACIC VASCULAR SURGERY)

## 2023-03-05 PROCEDURE — 85027 COMPLETE CBC AUTOMATED: CPT

## 2023-03-05 PROCEDURE — 85520 HEPARIN ASSAY: CPT

## 2023-03-05 PROCEDURE — 86900 BLOOD TYPING SEROLOGIC ABO: CPT

## 2023-03-05 PROCEDURE — 6370000000 HC RX 637 (ALT 250 FOR IP): Performed by: INTERNAL MEDICINE

## 2023-03-05 PROCEDURE — 6360000002 HC RX W HCPCS: Performed by: PHYSICIAN ASSISTANT

## 2023-03-05 PROCEDURE — 2580000003 HC RX 258: Performed by: NURSE PRACTITIONER

## 2023-03-05 PROCEDURE — 1100000000 HC RM PRIVATE

## 2023-03-05 RX ORDER — FAMOTIDINE 20 MG/1
20 TABLET, FILM COATED ORAL 2 TIMES DAILY
Status: DISCONTINUED | OUTPATIENT
Start: 2023-03-05 | End: 2023-03-06 | Stop reason: SDUPTHER

## 2023-03-05 RX ADMIN — SODIUM CHLORIDE, PRESERVATIVE FREE 5 ML: 5 INJECTION INTRAVENOUS at 08:47

## 2023-03-05 RX ADMIN — HEPARIN SODIUM 16 UNITS/KG/HR: 10000 INJECTION, SOLUTION INTRAVENOUS at 12:40

## 2023-03-05 RX ADMIN — HEPARIN SODIUM 16 UNITS/KG/HR: 10000 INJECTION, SOLUTION INTRAVENOUS at 18:56

## 2023-03-05 RX ADMIN — SODIUM CHLORIDE, PRESERVATIVE FREE 10 ML: 5 INJECTION INTRAVENOUS at 20:25

## 2023-03-05 RX ADMIN — FAMOTIDINE 20 MG: 20 TABLET, FILM COATED ORAL at 20:22

## 2023-03-05 RX ADMIN — METOPROLOL TARTRATE 12.5 MG: 25 TABLET, FILM COATED ORAL at 20:23

## 2023-03-05 RX ADMIN — METOPROLOL TARTRATE 12.5 MG: 25 TABLET, FILM COATED ORAL at 08:45

## 2023-03-05 RX ADMIN — ASPIRIN 81 MG: 81 TABLET ORAL at 08:45

## 2023-03-05 RX ADMIN — AMIODARONE HYDROCHLORIDE 600 MG: 200 TABLET ORAL at 15:21

## 2023-03-05 RX ADMIN — ROSUVASTATIN CALCIUM 40 MG: 20 TABLET, COATED ORAL at 20:23

## 2023-03-05 RX ADMIN — LEVOTHYROXINE SODIUM 100 MCG: 0.1 TABLET ORAL at 06:28

## 2023-03-05 ASSESSMENT — PAIN SCALES - GENERAL
PAINLEVEL_OUTOF10: 0

## 2023-03-05 NOTE — PROGRESS NOTES
[facility] CARDIOLOGY PROGRESS NOTE           3/5/2023 8:08 AM    Admit Date: 3/3/2023      Subjective:   No cp or sob    bjective:      Vitals:    03/04/23 1754 03/04/23 2007 03/05/23 0037 03/05/23 0503   BP: 129/71 119/67 (!) 96/51 125/73   Pulse: 57 56 58 57   Resp: 18 18 19 18   Temp: 97.2 °F (36.2 °C) 97.9 °F (36.6 °C) 98.1 °F (36.7 °C) 98.3 °F (36.8 °C)   TempSrc: Oral Oral Oral Oral   SpO2: 99% 93% 93% 94%   Weight:       Height:           Physical Exam:  General-No Acute Distress  Neck- supple, no JVD  CV- regular rate and rhythm no MRG  Lung- clear bilaterally  Abd- soft, nontender, nondistended  Ext- no edema bilaterally.   Skin- warm and dry    Data Review:   Recent Labs     03/03/23  0019 03/03/23  1606 03/04/23  0608 03/05/23  0359     --  138  --    K 4.0  --  3.9  --    MG 2.5  --  2.8*  --    BUN 18  --  14  --    WBC 8.2 7.8 7.1 6.4   HGB 13.9 13.5 13.1 12.9   HCT 43.4 43.1 40.8 40.4    207 205 208   INR  --  0.9 1.0  --    CHOL  --   --  286*  --    HDL  --   --  39*  --        Assessment/Plan:     Uap  Smoker  Hyperlipidemia    ///    For cabg Monday           Shane Ly MD  3/5/2023 8:08 AM

## 2023-03-06 ENCOUNTER — ANESTHESIA (OUTPATIENT)
Dept: SURGERY | Age: 63
End: 2023-03-06
Payer: COMMERCIAL

## 2023-03-06 ENCOUNTER — APPOINTMENT (OUTPATIENT)
Dept: GENERAL RADIOLOGY | Age: 63
DRG: 234 | End: 2023-03-06
Attending: INTERNAL MEDICINE
Payer: COMMERCIAL

## 2023-03-06 PROBLEM — I25.10 CAD, MULTIPLE VESSEL: Status: ACTIVE | Noted: 2023-03-06

## 2023-03-06 LAB
ACT AVERAGE - AAVG: 124 SEC
ACT AVERAGE - AAVG: 412 SEC
ACT AVERAGE - AAVG: 460 SEC
ACT AVERAGE - AAVG: 534 SEC
ANION GAP SERPL CALC-SCNC: 2 MMOL/L (ref 2–11)
APTT PPP: 30.1 SEC (ref 24.5–34.2)
ARTERIAL PATENCY WRIST A: ABNORMAL
BASE DEFICIT BLD-SCNC: 0.4 MMOL/L
BASE DEFICIT BLD-SCNC: 1.5 MMOL/L
BASE EXCESS BLD CALC-SCNC: 0.7 MMOL/L
BASE EXCESS BLD CALC-SCNC: 0.9 MMOL/L
BASE EXCESS BLD CALC-SCNC: 1.4 MMOL/L
BASE EXCESS BLD CALC-SCNC: 1.5 MMOL/L
BASE EXCESS BLD CALC-SCNC: 1.5 MMOL/L
BASE EXCESS BLD CALC-SCNC: 1.6 MMOL/L
BASELINE ACT: 156 SEC
BDY SITE: ABNORMAL
BUN SERPL-MCNC: 14 MG/DL (ref 8–23)
CA-I BLD-MCNC: 1.05 MMOL/L (ref 1.12–1.32)
CA-I BLD-MCNC: 1.05 MMOL/L (ref 1.12–1.32)
CA-I BLD-MCNC: 1.06 MMOL/L (ref 1.12–1.32)
CA-I BLD-MCNC: 1.07 MMOL/L (ref 1.12–1.32)
CA-I BLD-MCNC: 1.18 MMOL/L (ref 1.12–1.32)
CA-I BLD-MCNC: 1.22 MMOL/L (ref 1.12–1.32)
CA-I BLD-MCNC: 1.24 MMOL/L (ref 1.12–1.32)
CA-I BLD-MCNC: 1.3 MMOL/L (ref 1.12–1.32)
CALCIUM SERPL-MCNC: 8.5 MG/DL (ref 8.3–10.4)
CHLORIDE SERPL-SCNC: 115 MMOL/L (ref 101–110)
CO2 BLD-SCNC: 22 MMOL/L (ref 13–23)
CO2 BLD-SCNC: 23 MMOL/L (ref 13–23)
CO2 BLD-SCNC: 24 MMOL/L (ref 13–23)
CO2 BLD-SCNC: 25 MMOL/L (ref 13–23)
CO2 BLD-SCNC: 27 MMOL/L (ref 13–23)
CO2 BLD-SCNC: 28 MMOL/L (ref 13–23)
CO2 SERPL-SCNC: 25 MMOL/L (ref 21–32)
CREAT SERPL-MCNC: 0.8 MG/DL (ref 0.6–1)
EKG ATRIAL RATE: 58 BPM
EKG DIAGNOSIS: NORMAL
EKG P AXIS: 63 DEGREES
EKG P-R INTERVAL: 164 MS
EKG Q-T INTERVAL: 474 MS
EKG QRS DURATION: 94 MS
EKG QTC CALCULATION (BAZETT): 465 MS
EKG R AXIS: 72 DEGREES
EKG T AXIS: 53 DEGREES
EKG VENTRICULAR RATE: 58 BPM
ERYTHROCYTE [DISTWIDTH] IN BLOOD BY AUTOMATED COUNT: 14.6 % (ref 11.9–14.6)
ERYTHROCYTE [DISTWIDTH] IN BLOOD BY AUTOMATED COUNT: 14.6 % (ref 11.9–14.6)
FIBRINOGEN PPP-MCNC: 358 MG/DL (ref 190–501)
FIO2 ON VENT: 100 %
GLUCOSE BLD STRIP.AUTO-MCNC: 106 MG/DL (ref 65–100)
GLUCOSE BLD STRIP.AUTO-MCNC: 109 MG/DL (ref 65–100)
GLUCOSE BLD STRIP.AUTO-MCNC: 111 MG/DL (ref 65–100)
GLUCOSE BLD STRIP.AUTO-MCNC: 112 MG/DL (ref 65–100)
GLUCOSE BLD STRIP.AUTO-MCNC: 115 MG/DL (ref 65–100)
GLUCOSE BLD STRIP.AUTO-MCNC: 116 MG/DL (ref 65–100)
GLUCOSE BLD STRIP.AUTO-MCNC: 117 MG/DL (ref 65–100)
GLUCOSE BLD STRIP.AUTO-MCNC: 118 MG/DL (ref 65–100)
GLUCOSE BLD STRIP.AUTO-MCNC: 118 MG/DL (ref 65–100)
GLUCOSE BLD STRIP.AUTO-MCNC: 119 MG/DL (ref 65–100)
GLUCOSE BLD STRIP.AUTO-MCNC: 121 MG/DL (ref 65–100)
GLUCOSE BLD STRIP.AUTO-MCNC: 122 MG/DL (ref 65–100)
GLUCOSE BLD STRIP.AUTO-MCNC: 125 MG/DL (ref 65–100)
GLUCOSE BLD STRIP.AUTO-MCNC: 125 MG/DL (ref 65–100)
GLUCOSE BLD STRIP.AUTO-MCNC: 127 MG/DL (ref 65–100)
GLUCOSE BLD STRIP.AUTO-MCNC: 129 MG/DL (ref 65–100)
GLUCOSE SERPL-MCNC: 113 MG/DL (ref 65–100)
HCO3 BLD-SCNC: 23.1 MMOL/L (ref 22–26)
HCO3 BLD-SCNC: 23.9 MMOL/L (ref 22–26)
HCO3 BLD-SCNC: 25.3 MMOL/L (ref 22–26)
HCO3 BLD-SCNC: 25.5 MMOL/L (ref 22–26)
HCO3 BLD-SCNC: 25.8 MMOL/L (ref 22–26)
HCO3 BLD-SCNC: 25.9 MMOL/L (ref 22–26)
HCO3 BLD-SCNC: 27.6 MMOL/L (ref 22–26)
HCO3 BLD-SCNC: 28 MMOL/L (ref 22–26)
HCT VFR BLD AUTO: 33.5 % (ref 35.8–46.3)
HCT VFR BLD AUTO: 33.8 % (ref 35.8–46.3)
HCT VFR BLD AUTO: 34 % (ref 35.8–46.3)
HEPARIN BOLUS-PATIENT: NORMAL UNITS
HEPARIN BOLUS-PUMP: 0 UNITS
HEPARIN BOLUS-TOTAL: NORMAL UNITS
HEPARIN REQUIRED-PATIENT: 3276
HEPARIN REQUIRED-PATIENT: 99
HEPARIN REQUIRED-TOTAL: 120 UNITS
HEPARIN REQUIRED-TOTAL: 3977 UNITS
HEPARIN TEST CONCENTRATE: 2.5 MG/KG
HEPARIN TEST CONCENTRATE: 3 MG/KG
HGB BLD-MCNC: 10.7 G/DL (ref 11.7–15.4)
HGB BLD-MCNC: 10.9 G/DL (ref 11.7–15.4)
HGB BLD-MCNC: 11.2 G/DL (ref 11.7–15.4)
INR PPP: 1.1
MAGNESIUM SERPL-MCNC: 2.5 MG/DL (ref 1.8–2.4)
MAGNESIUM SERPL-MCNC: 2.6 MG/DL (ref 1.8–2.4)
MAGNESIUM SERPL-MCNC: 3.3 MG/DL (ref 1.8–2.4)
MCH RBC QN AUTO: 30.5 PG (ref 26.1–32.9)
MCH RBC QN AUTO: 31.2 PG (ref 26.1–32.9)
MCHC RBC AUTO-ENTMCNC: 31.9 G/DL (ref 31.4–35)
MCHC RBC AUTO-ENTMCNC: 33.1 G/DL (ref 31.4–35)
MCV RBC AUTO: 94.2 FL (ref 82–102)
MCV RBC AUTO: 95.4 FL (ref 82–102)
NRBC # BLD: 0 K/UL (ref 0–0.2)
NRBC # BLD: 0 K/UL (ref 0–0.2)
PCO2 BLD: 36.8 MMHG (ref 35–45)
PCO2 BLD: 36.9 MMHG (ref 35–45)
PCO2 BLD: 37.1 MMHG (ref 35–45)
PCO2 BLD: 38.3 MMHG (ref 35–45)
PCO2 BLD: 38.6 MMHG (ref 35–45)
PCO2 BLD: 39 MMHG (ref 35–45)
PCO2 BLD: 48.3 MMHG (ref 35–45)
PCO2 BLD: 55 MMHG (ref 35–45)
PEEP RESPIRATORY: 8
PH BLD: 7.31 (ref 7.35–7.45)
PH BLD: 7.37 (ref 7.35–7.45)
PH BLD: 7.4 (ref 7.35–7.45)
PH BLD: 7.42 (ref 7.35–7.45)
PH BLD: 7.43 (ref 7.35–7.45)
PH BLD: 7.43 (ref 7.35–7.45)
PH BLD: 7.44 (ref 7.35–7.45)
PH BLD: 7.45 (ref 7.35–7.45)
PLATELET # BLD AUTO: 145 K/UL (ref 150–450)
PLATELET # BLD AUTO: 150 K/UL (ref 150–450)
PMV BLD AUTO: 11 FL (ref 9.4–12.3)
PMV BLD AUTO: 11 FL (ref 9.4–12.3)
PO2 BLD: 155 MMHG (ref 75–100)
PO2 BLD: 193 MMHG (ref 75–100)
PO2 BLD: 232 MMHG (ref 75–100)
PO2 BLD: 260 MMHG (ref 75–100)
PO2 BLD: 285 MMHG (ref 75–100)
PO2 BLD: 304 MMHG (ref 75–100)
PO2 BLD: 341 MMHG (ref 75–100)
PO2 BLD: 528 MMHG (ref 75–100)
POTASSIUM BLD-SCNC: 3.7 MMOL/L (ref 3.5–5.1)
POTASSIUM BLD-SCNC: 4 MMOL/L (ref 3.5–5.1)
POTASSIUM BLD-SCNC: 4 MMOL/L (ref 3.5–5.1)
POTASSIUM BLD-SCNC: 4.3 MMOL/L (ref 3.5–5.1)
POTASSIUM BLD-SCNC: 4.5 MMOL/L (ref 3.5–5.1)
POTASSIUM BLD-SCNC: 4.5 MMOL/L (ref 3.5–5.1)
POTASSIUM BLD-SCNC: 4.8 MMOL/L (ref 3.5–5.1)
POTASSIUM BLD-SCNC: 5.2 MMOL/L (ref 3.5–5.1)
POTASSIUM SERPL-SCNC: 3.8 MMOL/L (ref 3.5–5.1)
POTASSIUM SERPL-SCNC: 4.1 MMOL/L (ref 3.5–5.1)
POTASSIUM SERPL-SCNC: 4.4 MMOL/L (ref 3.5–5.1)
PROJECTED HEPARIN CONCENTATION: 3 MG/KG
PROTAMINE DOSE-PUMP: 34 MG
PROTAMINE DOSE-PUMP: 41 MG
PROTAMINE DOSE-TOTAL: 193 MG
PROTAMINE DOSE-TOTAL: 232 MG
PROTHROMBIN TIME: 14.4 SEC (ref 12.6–14.3)
RBC # BLD AUTO: 3.51 M/UL (ref 4.05–5.2)
RBC # BLD AUTO: 3.59 M/UL (ref 4.05–5.2)
RESPIRATORY RATE: 18
SAO2 % BLD: 100 %
SAO2 % BLD: 99 %
SERVICE CMNT-IMP: ABNORMAL
SLOPE: 79
SODIUM BLD-SCNC: 138 MMOL/L (ref 136–145)
SODIUM BLD-SCNC: 140 MMOL/L (ref 136–145)
SODIUM BLD-SCNC: 140 MMOL/L (ref 136–145)
SODIUM BLD-SCNC: 141 MMOL/L (ref 136–145)
SODIUM BLD-SCNC: 142 MMOL/L (ref 136–145)
SODIUM BLD-SCNC: 143 MMOL/L (ref 136–145)
SODIUM BLD-SCNC: 143 MMOL/L (ref 136–145)
SODIUM BLD-SCNC: 146 MMOL/L (ref 136–145)
SODIUM SERPL-SCNC: 142 MMOL/L (ref 133–143)
SPECIMEN SITE: ABNORMAL
UFH PPP CHRO-ACNC: 0.37 IU/ML (ref 0.3–0.7)
VENTILATION MODE VENT: ABNORMAL
VT SETTING VENT: 450
WBC # BLD AUTO: 9.1 K/UL (ref 4.3–11.1)
WBC # BLD AUTO: 9.4 K/UL (ref 4.3–11.1)

## 2023-03-06 PROCEDURE — 85384 FIBRINOGEN ACTIVITY: CPT

## 2023-03-06 PROCEDURE — 3700000000 HC ANESTHESIA ATTENDED CARE: Performed by: THORACIC SURGERY (CARDIOTHORACIC VASCULAR SURGERY)

## 2023-03-06 PROCEDURE — C1751 CATH, INF, PER/CENT/MIDLINE: HCPCS | Performed by: THORACIC SURGERY (CARDIOTHORACIC VASCULAR SURGERY)

## 2023-03-06 PROCEDURE — 2500000003 HC RX 250 WO HCPCS: Performed by: PHYSICIAN ASSISTANT

## 2023-03-06 PROCEDURE — 85520 HEPARIN ASSAY: CPT

## 2023-03-06 PROCEDURE — 94640 AIRWAY INHALATION TREATMENT: CPT

## 2023-03-06 PROCEDURE — 2500000003 HC RX 250 WO HCPCS

## 2023-03-06 PROCEDURE — 6360000002 HC RX W HCPCS: Performed by: THORACIC SURGERY (CARDIOTHORACIC VASCULAR SURGERY)

## 2023-03-06 PROCEDURE — 71045 X-RAY EXAM CHEST 1 VIEW: CPT

## 2023-03-06 PROCEDURE — 6360000002 HC RX W HCPCS: Performed by: PHYSICIAN ASSISTANT

## 2023-03-06 PROCEDURE — 2100000000 HC CCU R&B

## 2023-03-06 PROCEDURE — 84132 ASSAY OF SERUM POTASSIUM: CPT

## 2023-03-06 PROCEDURE — C1769 GUIDE WIRE: HCPCS | Performed by: THORACIC SURGERY (CARDIOTHORACIC VASCULAR SURGERY)

## 2023-03-06 PROCEDURE — 2580000003 HC RX 258: Performed by: THORACIC SURGERY (CARDIOTHORACIC VASCULAR SURGERY)

## 2023-03-06 PROCEDURE — 83735 ASSAY OF MAGNESIUM: CPT

## 2023-03-06 PROCEDURE — 2500000003 HC RX 250 WO HCPCS: Performed by: NURSE ANESTHETIST, CERTIFIED REGISTERED

## 2023-03-06 PROCEDURE — C1713 ANCHOR/SCREW BN/BN,TIS/BN: HCPCS | Performed by: THORACIC SURGERY (CARDIOTHORACIC VASCULAR SURGERY)

## 2023-03-06 PROCEDURE — 82803 BLOOD GASES ANY COMBINATION: CPT

## 2023-03-06 PROCEDURE — 6360000002 HC RX W HCPCS: Performed by: NURSE ANESTHETIST, CERTIFIED REGISTERED

## 2023-03-06 PROCEDURE — 4A133B1 MONITORING OF ARTERIAL PRESSURE, PERIPHERAL, PERCUTANEOUS APPROACH: ICD-10-PCS | Performed by: THORACIC SURGERY (CARDIOTHORACIC VASCULAR SURGERY)

## 2023-03-06 PROCEDURE — 6370000000 HC RX 637 (ALT 250 FOR IP): Performed by: NURSE PRACTITIONER

## 2023-03-06 PROCEDURE — 2580000003 HC RX 258: Performed by: ANESTHESIOLOGY

## 2023-03-06 PROCEDURE — 2580000003 HC RX 258: Performed by: PHYSICIAN ASSISTANT

## 2023-03-06 PROCEDURE — 3600000018 HC SURGERY OHS ADDTL 15MIN: Performed by: THORACIC SURGERY (CARDIOTHORACIC VASCULAR SURGERY)

## 2023-03-06 PROCEDURE — 2580000003 HC RX 258

## 2023-03-06 PROCEDURE — 82962 GLUCOSE BLOOD TEST: CPT

## 2023-03-06 PROCEDURE — P9045 ALBUMIN (HUMAN), 5%, 250 ML: HCPCS

## 2023-03-06 PROCEDURE — 6370000000 HC RX 637 (ALT 250 FOR IP): Performed by: INTERNAL MEDICINE

## 2023-03-06 PROCEDURE — 6370000000 HC RX 637 (ALT 250 FOR IP): Performed by: THORACIC SURGERY (CARDIOTHORACIC VASCULAR SURGERY)

## 2023-03-06 PROCEDURE — 6370000000 HC RX 637 (ALT 250 FOR IP): Performed by: PHYSICIAN ASSISTANT

## 2023-03-06 PROCEDURE — 94002 VENT MGMT INPAT INIT DAY: CPT

## 2023-03-06 PROCEDURE — C1729 CATH, DRAINAGE: HCPCS | Performed by: THORACIC SURGERY (CARDIOTHORACIC VASCULAR SURGERY)

## 2023-03-06 PROCEDURE — 84295 ASSAY OF SERUM SODIUM: CPT

## 2023-03-06 PROCEDURE — 02HV33Z INSERTION OF INFUSION DEVICE INTO SUPERIOR VENA CAVA, PERCUTANEOUS APPROACH: ICD-10-PCS | Performed by: ANESTHESIOLOGY

## 2023-03-06 PROCEDURE — 6360000002 HC RX W HCPCS: Performed by: INTERNAL MEDICINE

## 2023-03-06 PROCEDURE — 2580000003 HC RX 258: Performed by: NURSE ANESTHETIST, CERTIFIED REGISTERED

## 2023-03-06 PROCEDURE — 6360000002 HC RX W HCPCS

## 2023-03-06 PROCEDURE — P9045 ALBUMIN (HUMAN), 5%, 250 ML: HCPCS | Performed by: THORACIC SURGERY (CARDIOTHORACIC VASCULAR SURGERY)

## 2023-03-06 PROCEDURE — 2720000010 HC SURG SUPPLY STERILE: Performed by: THORACIC SURGERY (CARDIOTHORACIC VASCULAR SURGERY)

## 2023-03-06 PROCEDURE — 80048 BASIC METABOLIC PNL TOTAL CA: CPT

## 2023-03-06 PROCEDURE — 3700000001 HC ADD 15 MINUTES (ANESTHESIA): Performed by: THORACIC SURGERY (CARDIOTHORACIC VASCULAR SURGERY)

## 2023-03-06 PROCEDURE — 3600000008 HC SURGERY OHS BASE: Performed by: THORACIC SURGERY (CARDIOTHORACIC VASCULAR SURGERY)

## 2023-03-06 PROCEDURE — 02100Z9 BYPASS CORONARY ARTERY, ONE ARTERY FROM LEFT INTERNAL MAMMARY, OPEN APPROACH: ICD-10-PCS | Performed by: THORACIC SURGERY (CARDIOTHORACIC VASCULAR SURGERY)

## 2023-03-06 PROCEDURE — 37799 UNLISTED PX VASCULAR SURGERY: CPT

## 2023-03-06 PROCEDURE — 85347 COAGULATION TIME ACTIVATED: CPT

## 2023-03-06 PROCEDURE — 2709999900 HC NON-CHARGEABLE SUPPLY: Performed by: THORACIC SURGERY (CARDIOTHORACIC VASCULAR SURGERY)

## 2023-03-06 PROCEDURE — A4216 STERILE WATER/SALINE, 10 ML: HCPCS | Performed by: PHYSICIAN ASSISTANT

## 2023-03-06 PROCEDURE — 93005 ELECTROCARDIOGRAM TRACING: CPT | Performed by: PHYSICIAN ASSISTANT

## 2023-03-06 PROCEDURE — 85730 THROMBOPLASTIN TIME PARTIAL: CPT

## 2023-03-06 PROCEDURE — 85610 PROTHROMBIN TIME: CPT

## 2023-03-06 PROCEDURE — 85027 COMPLETE CBC AUTOMATED: CPT

## 2023-03-06 PROCEDURE — 36415 COLL VENOUS BLD VENIPUNCTURE: CPT

## 2023-03-06 PROCEDURE — B24BZZ4 ULTRASONOGRAPHY OF HEART WITH AORTA, TRANSESOPHAGEAL: ICD-10-PCS | Performed by: THORACIC SURGERY (CARDIOTHORACIC VASCULAR SURGERY)

## 2023-03-06 PROCEDURE — 85530 HEPARIN-PROTAMINE TOLERANCE: CPT

## 2023-03-06 PROCEDURE — 2700000000 HC OXYGEN THERAPY PER DAY

## 2023-03-06 PROCEDURE — 06BQ4ZZ EXCISION OF LEFT SAPHENOUS VEIN, PERCUTANEOUS ENDOSCOPIC APPROACH: ICD-10-PCS | Performed by: THORACIC SURGERY (CARDIOTHORACIC VASCULAR SURGERY)

## 2023-03-06 PROCEDURE — 2580000003 HC RX 258: Performed by: NURSE PRACTITIONER

## 2023-03-06 PROCEDURE — 85018 HEMOGLOBIN: CPT

## 2023-03-06 PROCEDURE — P9047 ALBUMIN (HUMAN), 25%, 50ML: HCPCS

## 2023-03-06 PROCEDURE — 021209W BYPASS CORONARY ARTERY, THREE ARTERIES FROM AORTA WITH AUTOLOGOUS VENOUS TISSUE, OPEN APPROACH: ICD-10-PCS | Performed by: THORACIC SURGERY (CARDIOTHORACIC VASCULAR SURGERY)

## 2023-03-06 RX ORDER — SODIUM CHLORIDE 9 MG/ML
INJECTION, SOLUTION INTRAVENOUS PRN
Status: DISCONTINUED | OUTPATIENT
Start: 2023-03-06 | End: 2023-03-08

## 2023-03-06 RX ORDER — FENTANYL CITRATE 50 UG/ML
100 INJECTION, SOLUTION INTRAMUSCULAR; INTRAVENOUS
Status: DISCONTINUED | OUTPATIENT
Start: 2023-03-06 | End: 2023-03-06 | Stop reason: HOSPADM

## 2023-03-06 RX ORDER — NITROGLYCERIN 20 MG/100ML
INJECTION INTRAVENOUS CONTINUOUS PRN
Status: DISCONTINUED | OUTPATIENT
Start: 2023-03-06 | End: 2023-03-06 | Stop reason: SDUPTHER

## 2023-03-06 RX ORDER — KETOROLAC TROMETHAMINE 30 MG/ML
30 INJECTION, SOLUTION INTRAMUSCULAR; INTRAVENOUS ONCE
Status: COMPLETED | OUTPATIENT
Start: 2023-03-06 | End: 2023-03-06

## 2023-03-06 RX ORDER — DEXMEDETOMIDINE HYDROCHLORIDE 4 UG/ML
.1-1.5 INJECTION, SOLUTION INTRAVENOUS CONTINUOUS
Status: DISCONTINUED | OUTPATIENT
Start: 2023-03-06 | End: 2023-03-07

## 2023-03-06 RX ORDER — MORPHINE SULFATE 4 MG/ML
3 INJECTION, SOLUTION INTRAMUSCULAR; INTRAVENOUS
Status: DISCONTINUED | OUTPATIENT
Start: 2023-03-06 | End: 2023-03-07

## 2023-03-06 RX ORDER — OXYCODONE HYDROCHLORIDE AND ACETAMINOPHEN 5; 325 MG/1; MG/1
1 TABLET ORAL EVERY 4 HOURS PRN
Status: DISCONTINUED | OUTPATIENT
Start: 2023-03-06 | End: 2023-03-07 | Stop reason: SDUPTHER

## 2023-03-06 RX ORDER — MIDAZOLAM HYDROCHLORIDE 2 MG/2ML
1 INJECTION, SOLUTION INTRAMUSCULAR; INTRAVENOUS
Status: DISCONTINUED | OUTPATIENT
Start: 2023-03-06 | End: 2023-03-07

## 2023-03-06 RX ORDER — DEXTROSE, SODIUM CHLORIDE, AND POTASSIUM CHLORIDE 5; .45; .15 G/100ML; G/100ML; G/100ML
INJECTION INTRAVENOUS CONTINUOUS
Status: DISCONTINUED | OUTPATIENT
Start: 2023-03-06 | End: 2023-03-07

## 2023-03-06 RX ORDER — ETOMIDATE 2 MG/ML
INJECTION INTRAVENOUS PRN
Status: DISCONTINUED | OUTPATIENT
Start: 2023-03-06 | End: 2023-03-06 | Stop reason: SDUPTHER

## 2023-03-06 RX ORDER — MIDAZOLAM HYDROCHLORIDE 1 MG/ML
INJECTION INTRAMUSCULAR; INTRAVENOUS PRN
Status: DISCONTINUED | OUTPATIENT
Start: 2023-03-06 | End: 2023-03-06 | Stop reason: SDUPTHER

## 2023-03-06 RX ORDER — LIDOCAINE HYDROCHLORIDE 10 MG/ML
1 INJECTION, SOLUTION INFILTRATION; PERINEURAL
Status: DISCONTINUED | OUTPATIENT
Start: 2023-03-06 | End: 2023-03-06 | Stop reason: HOSPADM

## 2023-03-06 RX ORDER — NITROGLYCERIN 20 MG/100ML
0-100 INJECTION INTRAVENOUS CONTINUOUS PRN
Status: DISCONTINUED | OUTPATIENT
Start: 2023-03-06 | End: 2023-03-07

## 2023-03-06 RX ORDER — AMINOCAPROIC ACID 250 MG/ML
INJECTION, SOLUTION INTRAVENOUS PRN
Status: DISCONTINUED | OUTPATIENT
Start: 2023-03-06 | End: 2023-03-06 | Stop reason: SDUPTHER

## 2023-03-06 RX ORDER — KETOROLAC TROMETHAMINE 15 MG/ML
15 INJECTION, SOLUTION INTRAMUSCULAR; INTRAVENOUS EVERY 6 HOURS PRN
Status: COMPLETED | OUTPATIENT
Start: 2023-03-06 | End: 2023-03-08

## 2023-03-06 RX ORDER — ONDANSETRON 2 MG/ML
4 INJECTION INTRAMUSCULAR; INTRAVENOUS EVERY 4 HOURS PRN
Status: DISCONTINUED | OUTPATIENT
Start: 2023-03-06 | End: 2023-03-08

## 2023-03-06 RX ORDER — DEXMEDETOMIDINE HYDROCHLORIDE 4 UG/ML
INJECTION, SOLUTION INTRAVENOUS CONTINUOUS PRN
Status: DISCONTINUED | OUTPATIENT
Start: 2023-03-06 | End: 2023-03-06 | Stop reason: SDUPTHER

## 2023-03-06 RX ORDER — CHLORHEXIDINE GLUCONATE 0.12 MG/ML
10 RINSE ORAL 2 TIMES DAILY
Status: DISCONTINUED | OUTPATIENT
Start: 2023-03-06 | End: 2023-03-07

## 2023-03-06 RX ORDER — ACETAMINOPHEN 325 MG/1
650 TABLET ORAL EVERY 4 HOURS PRN
Status: DISCONTINUED | OUTPATIENT
Start: 2023-03-06 | End: 2023-03-08

## 2023-03-06 RX ORDER — MAGNESIUM SULFATE 1 G/100ML
1000 INJECTION INTRAVENOUS PRN
Status: DISCONTINUED | OUTPATIENT
Start: 2023-03-06 | End: 2023-03-08

## 2023-03-06 RX ORDER — SODIUM CHLORIDE 9 MG/ML
INJECTION, SOLUTION INTRAVENOUS PRN
Status: DISCONTINUED | OUTPATIENT
Start: 2023-03-06 | End: 2023-03-06 | Stop reason: HOSPADM

## 2023-03-06 RX ORDER — FENTANYL CITRATE 0.05 MG/ML
INJECTION, SOLUTION INTRAMUSCULAR; INTRAVENOUS PRN
Status: DISCONTINUED | OUTPATIENT
Start: 2023-03-06 | End: 2023-03-06 | Stop reason: SDUPTHER

## 2023-03-06 RX ORDER — SODIUM CHLORIDE 0.9 % (FLUSH) 0.9 %
5-40 SYRINGE (ML) INJECTION EVERY 12 HOURS SCHEDULED
Status: DISCONTINUED | OUTPATIENT
Start: 2023-03-06 | End: 2023-03-08

## 2023-03-06 RX ORDER — EPHEDRINE SULFATE/0.9% NACL/PF 50 MG/5 ML
SYRINGE (ML) INTRAVENOUS PRN
Status: DISCONTINUED | OUTPATIENT
Start: 2023-03-06 | End: 2023-03-06 | Stop reason: SDUPTHER

## 2023-03-06 RX ORDER — SODIUM CHLORIDE, SODIUM LACTATE, POTASSIUM CHLORIDE, CALCIUM CHLORIDE 600; 310; 30; 20 MG/100ML; MG/100ML; MG/100ML; MG/100ML
INJECTION, SOLUTION INTRAVENOUS CONTINUOUS
Status: DISCONTINUED | OUTPATIENT
Start: 2023-03-06 | End: 2023-03-06 | Stop reason: HOSPADM

## 2023-03-06 RX ORDER — SODIUM CHLORIDE 0.9 % (FLUSH) 0.9 %
5-40 SYRINGE (ML) INJECTION EVERY 12 HOURS SCHEDULED
Status: DISCONTINUED | OUTPATIENT
Start: 2023-03-06 | End: 2023-03-06 | Stop reason: HOSPADM

## 2023-03-06 RX ORDER — MORPHINE SULFATE 4 MG/ML
4 INJECTION, SOLUTION INTRAMUSCULAR; INTRAVENOUS
Status: DISCONTINUED | OUTPATIENT
Start: 2023-03-06 | End: 2023-03-07

## 2023-03-06 RX ORDER — PAPAVERINE HYDROCHLORIDE 30 MG/ML
INJECTION INTRAMUSCULAR; INTRAVENOUS PRN
Status: DISCONTINUED | OUTPATIENT
Start: 2023-03-06 | End: 2023-03-06 | Stop reason: HOSPADM

## 2023-03-06 RX ORDER — NOREPINEPHRINE BIT/0.9 % NACL 16MG/250ML
.01-.1 INFUSION BOTTLE (ML) INTRAVENOUS CONTINUOUS PRN
Status: DISCONTINUED | OUTPATIENT
Start: 2023-03-06 | End: 2023-03-07

## 2023-03-06 RX ORDER — MIDAZOLAM HYDROCHLORIDE 2 MG/2ML
2 INJECTION, SOLUTION INTRAMUSCULAR; INTRAVENOUS
Status: DISCONTINUED | OUTPATIENT
Start: 2023-03-06 | End: 2023-03-06 | Stop reason: HOSPADM

## 2023-03-06 RX ORDER — BUDESONIDE 0.5 MG/2ML
0.5 INHALANT ORAL 2 TIMES DAILY
Status: DISCONTINUED | OUTPATIENT
Start: 2023-03-06 | End: 2023-03-08

## 2023-03-06 RX ORDER — HEPARIN SODIUM 1000 [USP'U]/ML
INJECTION, SOLUTION INTRAVENOUS; SUBCUTANEOUS PRN
Status: DISCONTINUED | OUTPATIENT
Start: 2023-03-06 | End: 2023-03-06 | Stop reason: SDUPTHER

## 2023-03-06 RX ORDER — NICARDIPINE HYDROCHLORIDE 0.1 MG/ML
INJECTION INTRAVENOUS PRN
Status: DISCONTINUED | OUTPATIENT
Start: 2023-03-06 | End: 2023-03-06 | Stop reason: SDUPTHER

## 2023-03-06 RX ORDER — AMIODARONE HYDROCHLORIDE 200 MG/1
200 TABLET ORAL 2 TIMES DAILY
Status: DISCONTINUED | OUTPATIENT
Start: 2023-03-06 | End: 2023-03-08

## 2023-03-06 RX ORDER — FAMOTIDINE 20 MG/1
20 TABLET, FILM COATED ORAL 2 TIMES DAILY
Status: DISCONTINUED | OUTPATIENT
Start: 2023-03-06 | End: 2023-03-08

## 2023-03-06 RX ORDER — IPRATROPIUM BROMIDE AND ALBUTEROL SULFATE 2.5; .5 MG/3ML; MG/3ML
1 SOLUTION RESPIRATORY (INHALATION) 4 TIMES DAILY
Status: DISCONTINUED | OUTPATIENT
Start: 2023-03-06 | End: 2023-03-08

## 2023-03-06 RX ORDER — INSULIN LISPRO 100 [IU]/ML
0-12 INJECTION, SOLUTION INTRAVENOUS; SUBCUTANEOUS
Status: DISCONTINUED | OUTPATIENT
Start: 2023-03-07 | End: 2023-03-08

## 2023-03-06 RX ORDER — INSULIN LISPRO 100 [IU]/ML
0-6 INJECTION, SOLUTION INTRAVENOUS; SUBCUTANEOUS NIGHTLY
Status: DISCONTINUED | OUTPATIENT
Start: 2023-03-07 | End: 2023-03-08

## 2023-03-06 RX ORDER — ALBUMIN, HUMAN INJ 5% 5 %
SOLUTION INTRAVENOUS CONTINUOUS PRN
Status: COMPLETED | OUTPATIENT
Start: 2023-03-06 | End: 2023-03-06

## 2023-03-06 RX ORDER — PROTAMINE SULFATE 10 MG/ML
INJECTION, SOLUTION INTRAVENOUS PRN
Status: DISCONTINUED | OUTPATIENT
Start: 2023-03-06 | End: 2023-03-06 | Stop reason: SDUPTHER

## 2023-03-06 RX ORDER — SODIUM CHLORIDE 0.9 % (FLUSH) 0.9 %
5-40 SYRINGE (ML) INJECTION PRN
Status: DISCONTINUED | OUTPATIENT
Start: 2023-03-06 | End: 2023-03-06 | Stop reason: HOSPADM

## 2023-03-06 RX ORDER — ROCURONIUM BROMIDE 10 MG/ML
INJECTION, SOLUTION INTRAVENOUS PRN
Status: DISCONTINUED | OUTPATIENT
Start: 2023-03-06 | End: 2023-03-06 | Stop reason: SDUPTHER

## 2023-03-06 RX ORDER — VECURONIUM BROMIDE 1 MG/ML
INJECTION, POWDER, LYOPHILIZED, FOR SOLUTION INTRAVENOUS PRN
Status: DISCONTINUED | OUTPATIENT
Start: 2023-03-06 | End: 2023-03-06 | Stop reason: SDUPTHER

## 2023-03-06 RX ORDER — POTASSIUM CHLORIDE 29.8 MG/ML
20 INJECTION INTRAVENOUS PRN
Status: DISCONTINUED | OUTPATIENT
Start: 2023-03-06 | End: 2023-03-07

## 2023-03-06 RX ORDER — SODIUM CHLORIDE 0.9 % (FLUSH) 0.9 %
5-40 SYRINGE (ML) INJECTION PRN
Status: DISCONTINUED | OUTPATIENT
Start: 2023-03-06 | End: 2023-03-08

## 2023-03-06 RX ORDER — SODIUM CHLORIDE 9 MG/ML
INJECTION, SOLUTION INTRAVENOUS CONTINUOUS PRN
Status: DISCONTINUED | OUTPATIENT
Start: 2023-03-06 | End: 2023-03-06 | Stop reason: SDUPTHER

## 2023-03-06 RX ORDER — SODIUM CHLORIDE, SODIUM LACTATE, POTASSIUM CHLORIDE, CALCIUM CHLORIDE 600; 310; 30; 20 MG/100ML; MG/100ML; MG/100ML; MG/100ML
INJECTION, SOLUTION INTRAVENOUS CONTINUOUS PRN
Status: DISCONTINUED | OUTPATIENT
Start: 2023-03-06 | End: 2023-03-06 | Stop reason: SDUPTHER

## 2023-03-06 RX ADMIN — FENTANYL CITRATE 100 MCG: 0.05 INJECTION, SOLUTION INTRAMUSCULAR; INTRAVENOUS at 08:01

## 2023-03-06 RX ADMIN — ASPIRIN 81 MG: 81 TABLET ORAL at 03:33

## 2023-03-06 RX ADMIN — NITROGLYCERIN 20 MCG: 20 INJECTION INTRAVENOUS at 09:27

## 2023-03-06 RX ADMIN — NICARDIPINE HYDROCHLORIDE 0.2 MG: 0.1 INJECTION INTRAVENOUS at 11:28

## 2023-03-06 RX ADMIN — LEVOTHYROXINE SODIUM 100 MCG: 0.1 TABLET ORAL at 03:33

## 2023-03-06 RX ADMIN — HEPARIN SODIUM 25000 UNITS: 1000 INJECTION INTRAVENOUS; SUBCUTANEOUS at 09:17

## 2023-03-06 RX ADMIN — ROSUVASTATIN CALCIUM 40 MG: 20 TABLET, COATED ORAL at 21:11

## 2023-03-06 RX ADMIN — MIDAZOLAM 2 MG: 1 INJECTION INTRAMUSCULAR; INTRAVENOUS at 07:10

## 2023-03-06 RX ADMIN — OXYCODONE AND ACETAMINOPHEN 1 TABLET: 5; 325 TABLET ORAL at 19:28

## 2023-03-06 RX ADMIN — AMINOCAPROIC ACID 1 G/HR: 250 INJECTION, SOLUTION INTRAVENOUS at 08:28

## 2023-03-06 RX ADMIN — FENTANYL CITRATE 150 MCG: 0.05 INJECTION, SOLUTION INTRAMUSCULAR; INTRAVENOUS at 09:24

## 2023-03-06 RX ADMIN — ROCURONIUM BROMIDE 30 MG: 10 INJECTION, SOLUTION INTRAVENOUS at 10:36

## 2023-03-06 RX ADMIN — FENTANYL CITRATE 250 MCG: 0.05 INJECTION, SOLUTION INTRAMUSCULAR; INTRAVENOUS at 10:10

## 2023-03-06 RX ADMIN — SODIUM CHLORIDE: 9 INJECTION, SOLUTION INTRAVENOUS at 12:45

## 2023-03-06 RX ADMIN — Medication 2000 MG: at 11:30

## 2023-03-06 RX ADMIN — Medication 5 MG: at 08:43

## 2023-03-06 RX ADMIN — SODIUM CHLORIDE, SODIUM LACTATE, POTASSIUM CHLORIDE, AND CALCIUM CHLORIDE: 600; 310; 30; 20 INJECTION, SOLUTION INTRAVENOUS at 07:10

## 2023-03-06 RX ADMIN — CHLORHEXIDINE GLUCONATE 10 ML: 1.2 RINSE ORAL at 13:11

## 2023-03-06 RX ADMIN — VECURONIUM BROMIDE 2 MG: 1 INJECTION, POWDER, LYOPHILIZED, FOR SOLUTION INTRAVENOUS at 08:16

## 2023-03-06 RX ADMIN — HEPARIN SODIUM 5000 UNITS: 1000 INJECTION INTRAVENOUS; SUBCUTANEOUS at 09:32

## 2023-03-06 RX ADMIN — AMIODARONE HYDROCHLORIDE 600 MG: 200 TABLET ORAL at 03:33

## 2023-03-06 RX ADMIN — ROCURONIUM BROMIDE 50 MG: 10 INJECTION, SOLUTION INTRAVENOUS at 07:25

## 2023-03-06 RX ADMIN — PROTAMINE SULFATE 200 MG: 10 INJECTION, SOLUTION INTRAVENOUS at 11:07

## 2023-03-06 RX ADMIN — Medication 0.01 MCG/KG/MIN: at 10:52

## 2023-03-06 RX ADMIN — FENTANYL CITRATE 150 MCG: 0.05 INJECTION, SOLUTION INTRAMUSCULAR; INTRAVENOUS at 08:19

## 2023-03-06 RX ADMIN — DEXMEDETOMIDINE HYDROCHLORIDE 0.5 MCG/KG/HR: 4 INJECTION, SOLUTION INTRAVENOUS at 10:02

## 2023-03-06 RX ADMIN — MIDAZOLAM 2 MG: 1 INJECTION INTRAMUSCULAR; INTRAVENOUS at 10:36

## 2023-03-06 RX ADMIN — NITROGLYCERIN 20 MCG: 20 INJECTION INTRAVENOUS at 09:30

## 2023-03-06 RX ADMIN — Medication 10 MG: at 07:41

## 2023-03-06 RX ADMIN — OXYCODONE AND ACETAMINOPHEN 1 TABLET: 5; 325 TABLET ORAL at 23:35

## 2023-03-06 RX ADMIN — METOPROLOL TARTRATE 12.5 MG: 25 TABLET, FILM COATED ORAL at 03:33

## 2023-03-06 RX ADMIN — NITROGLYCERIN 20 MCG: 20 INJECTION INTRAVENOUS at 09:29

## 2023-03-06 RX ADMIN — VECURONIUM BROMIDE 2 MG: 1 INJECTION, POWDER, LYOPHILIZED, FOR SOLUTION INTRAVENOUS at 09:26

## 2023-03-06 RX ADMIN — DEXTROSE MONOHYDRATE, SODIUM CHLORIDE, AND POTASSIUM CHLORIDE: 50; 4.5; 1.49 INJECTION, SOLUTION INTRAVENOUS at 13:11

## 2023-03-06 RX ADMIN — KETOROLAC TROMETHAMINE 30 MG: 30 INJECTION, SOLUTION INTRAMUSCULAR at 16:58

## 2023-03-06 RX ADMIN — Medication 10 MG: at 07:50

## 2023-03-06 RX ADMIN — SODIUM CHLORIDE: 9 INJECTION, SOLUTION INTRAVENOUS at 07:54

## 2023-03-06 RX ADMIN — BUDESONIDE 500 MCG: 0.5 INHALANT RESPIRATORY (INHALATION) at 19:46

## 2023-03-06 RX ADMIN — FAMOTIDINE 20 MG: 20 TABLET, FILM COATED ORAL at 03:33

## 2023-03-06 RX ADMIN — TUBERCULIN PURIFIED PROTEIN DERIVATIVE 5 UNITS: 5 INJECTION, SOLUTION INTRADERMAL at 21:10

## 2023-03-06 RX ADMIN — SODIUM CHLORIDE, POTASSIUM CHLORIDE, SODIUM LACTATE AND CALCIUM CHLORIDE: 600; 310; 30; 20 INJECTION, SOLUTION INTRAVENOUS at 05:48

## 2023-03-06 RX ADMIN — Medication 2000 MG: at 18:38

## 2023-03-06 RX ADMIN — ETOMIDATE INJECTION 20 MG: 2 SOLUTION INTRAVENOUS at 07:25

## 2023-03-06 RX ADMIN — Medication 3 AMPULE: at 03:37

## 2023-03-06 RX ADMIN — AMINOCAPROIC ACID 10000 MG: 250 INJECTION, SOLUTION INTRAVENOUS at 07:56

## 2023-03-06 RX ADMIN — IPRATROPIUM BROMIDE AND ALBUTEROL SULFATE 1 AMPULE: .5; 3 SOLUTION RESPIRATORY (INHALATION) at 16:21

## 2023-03-06 RX ADMIN — VECURONIUM BROMIDE 2 MG: 1 INJECTION, POWDER, LYOPHILIZED, FOR SOLUTION INTRAVENOUS at 08:43

## 2023-03-06 RX ADMIN — FENTANYL CITRATE 250 MCG: 0.05 INJECTION, SOLUTION INTRAMUSCULAR; INTRAVENOUS at 08:43

## 2023-03-06 RX ADMIN — Medication 2000 MG: at 08:00

## 2023-03-06 RX ADMIN — FENTANYL CITRATE 100 MCG: 0.05 INJECTION, SOLUTION INTRAMUSCULAR; INTRAVENOUS at 08:49

## 2023-03-06 RX ADMIN — SODIUM CHLORIDE, PRESERVATIVE FREE 10 ML: 5 INJECTION INTRAVENOUS at 21:12

## 2023-03-06 RX ADMIN — VECURONIUM BROMIDE 1 MG: 1 INJECTION, POWDER, LYOPHILIZED, FOR SOLUTION INTRAVENOUS at 10:10

## 2023-03-06 RX ADMIN — FAMOTIDINE 20 MG: 10 INJECTION, SOLUTION INTRAVENOUS at 17:51

## 2023-03-06 RX ADMIN — NITROGLYCERIN 20 MCG: 20 INJECTION INTRAVENOUS at 09:33

## 2023-03-06 RX ADMIN — NITROGLYCERIN 20 MCG: 20 INJECTION INTRAVENOUS at 11:23

## 2023-03-06 RX ADMIN — Medication 1 AMPULE: at 21:10

## 2023-03-06 RX ADMIN — IPRATROPIUM BROMIDE AND ALBUTEROL SULFATE 1 AMPULE: .5; 3 SOLUTION RESPIRATORY (INHALATION) at 19:46

## 2023-03-06 RX ADMIN — MIDAZOLAM 1 MG: 1 INJECTION INTRAMUSCULAR; INTRAVENOUS at 07:25

## 2023-03-06 RX ADMIN — NITROGLYCERIN 10 MCG/MIN: 20 INJECTION INTRAVENOUS at 07:56

## 2023-03-06 ASSESSMENT — PAIN SCALES - GENERAL
PAINLEVEL_OUTOF10: 0
PAINLEVEL_OUTOF10: 1
PAINLEVEL_OUTOF10: 0
PAINLEVEL_OUTOF10: 4
PAINLEVEL_OUTOF10: 0
PAINLEVEL_OUTOF10: 0
PAINLEVEL_OUTOF10: 6
PAINLEVEL_OUTOF10: 0
PAINLEVEL_OUTOF10: 4

## 2023-03-06 ASSESSMENT — PAIN DESCRIPTION - DESCRIPTORS
DESCRIPTORS: ACHING;SORE
DESCRIPTORS: ACHING;SORE
DESCRIPTORS: ACHING

## 2023-03-06 ASSESSMENT — PAIN DESCRIPTION - LOCATION
LOCATION: INCISION
LOCATION: INCISION;CHEST
LOCATION: INCISION

## 2023-03-06 ASSESSMENT — PAIN DESCRIPTION - FREQUENCY: FREQUENCY: CONTINUOUS

## 2023-03-06 ASSESSMENT — PAIN - FUNCTIONAL ASSESSMENT
PAIN_FUNCTIONAL_ASSESSMENT: ACTIVITIES ARE NOT PREVENTED
PAIN_FUNCTIONAL_ASSESSMENT: PREVENTS OR INTERFERES SOME ACTIVE ACTIVITIES AND ADLS
PAIN_FUNCTIONAL_ASSESSMENT: 0-10
PAIN_FUNCTIONAL_ASSESSMENT: ACTIVITIES ARE NOT PREVENTED

## 2023-03-06 ASSESSMENT — PAIN DESCRIPTION - ONSET: ONSET: SUDDEN

## 2023-03-06 ASSESSMENT — LIFESTYLE VARIABLES: SMOKING_STATUS: 1

## 2023-03-06 ASSESSMENT — PULMONARY FUNCTION TESTS: PIF_VALUE: 22

## 2023-03-06 ASSESSMENT — PAIN DESCRIPTION - ORIENTATION
ORIENTATION: ANTERIOR;MID
ORIENTATION: MID
ORIENTATION: MID;ANTERIOR

## 2023-03-06 ASSESSMENT — PAIN DESCRIPTION - PAIN TYPE: TYPE: SURGICAL PAIN

## 2023-03-06 NOTE — ANESTHESIA POSTPROCEDURE EVALUATION
Department of Anesthesiology  Postprocedure Note    Patient: Erica Palafox  MRN: [id number]  YOB: 1960  Date of evaluation: 3/6/2023      Procedure Summary     Date: 03/06/23 Room / Location: [facility] MAIN OR 04 CARDIAC / [facility] MAIN OR    Anesthesia Start: 1674 Anesthesia Stop: 8852    Procedures:       CORONARY ARTERY BYPASS GRAFT (CABG X 4), LIMA; ENDOSCOPIC VEIN HARVEST, LEFT GREATER SAPHENOUS VEIN (Chest)      TRANSESOPHAGEAL ECHOCARDIOGRAM (Esophagus) Diagnosis:       Atherosclerotic heart disease of native coronary artery with unstable angina pectoris ([address]      (Atherosclerotic heart disease of native coronary artery with unstable angina pectoris ([address] [I25.110])    Providers: Mateo Catalan MD Responsible Provider: Milly Taylor MD    Anesthesia Type: general ASA Status: 4          Anesthesia Type: No value filed.     Amos Phase I: Amos Score: 10    Amos Phase II:        Anesthesia Post Evaluation    Patient location during evaluation: ICU  Patient participation: complete - patient cannot participate  Level of consciousness: sedated and ventilated  Airway patency: patent  Complications: no  Cardiovascular status: hemodynamically stable  Respiratory status: intubated and ventilator  Hydration status: euvolemic  Comments: /69   Pulse 56   Temp 97.2 °F (36.2 °C) (Bladder)   Resp 15   Ht 5' 7.99\" (1.727 m)   Wt 171 lb 8 oz (77.8 kg)   SpO2 99%   BMI 26.08 kg/m²

## 2023-03-06 NOTE — CARE COORDINATION
CM continues to follow for discharge  planning  and/or CM needs. Discharge  plan pending clinical progress. Pt s/p CV surgery this day. CM  to continue to monitor and update as needed.

## 2023-03-06 NOTE — PROGRESS NOTES
Spoke with blood bank to confirm blood is ready for CABG in the morning.   Blood bank confirmed that the units of blood are ready and are on standby for the procedure

## 2023-03-06 NOTE — OP NOTE
[facility]  OPERATIVE REPORT    Name:  Ned Taveras  MR#:  [id number]  :  1960  ACCOUNT #:  [de-identified]  DATE OF SERVICE:  2023    PREOPERATIVE DIAGNOSIS:  Severe multivessel atherosclerotic coronary artery disease. POSTOPERATIVE DIAGNOSIS:  Severe multivessel atherosclerotic coronary artery disease. PROCEDURES PERFORMED:  1. Endoscopic vein harvest from left leg. 2.  Coronary artery bypass grafting x4. Grafts consisting:  A. Left internal mammary artery to LAD. B.  Reverse saphenous vein graft to the diagonal.  C.  Reverse saphenous vein graft to OM-1.  D.  Reverse saphenous vein graft to the OM-3. SURGEON:  Montana Benedict MD    ASSISTANT:      ANESTHESIA:  Endotracheal with NIDIA    COMPLICATIONS:  None. SPECIMENS REMOVED:      IMPLANTS:      ESTIMATED BLOOD LOSS:  Minimal.    OPERATIVE FINDINGS:  Saphenous vein 3 to 4 mm. LIMA was small 2.0 mm. Aorta is soft. LAD is 1.3 mm, severe distal disease. Diagonal is 1.4 mm, severe distal disease. OM1 is 1.4 mm, severe distal disease. OM-3 is 1.5 mm, severe distal disease. INDICATION:  The patient is a 28-year-old lady who presented to the emergency room with unstable angina. She had negative enzymes. Left heart cath showed significant multivessel disease with preserved LV function. DESCRIPTION OF PROCEDURE:  After informed consent was obtained for the above-mentioned procedure, the patient was then taken to the operating room. Appropriate monitoring lines were placed by the Anesthesia Department. After administration of general endotracheal anesthesia, the patient was prepped and draped in the usual fashion with Betadine scrub and paint and Ioban cardiovascular drapes. The chest was then entered through a standard mediastinotomy incision while simultaneous harvesting of peripheral conduit was undertaken. After harvesting the conduit, it was inspected and noted to be adequate.   The incisions were subsequently closed in a 2-layer fashion of 3-0 and 4-0 Vicryl. The left internal mammary harvest was then undertaken in pedicle fashion and was injected with Papaverine solution. After administration of heparin, the distal pedicle was incised and noted to bleed briskly. A thoracostomy tube was then placed. This was brought out through a separate stab incision inferiorly and affixed to the skin. The pericardium was then opened and tacked up into a pericardial well, revealing the heart. Pursestrings were then placed into the aorta, the right atrial appendage, and the right atrium. After adequate ACT was obtained, the patient was then cannulated through these pursestrings, at which point cardiopulmonary bypass was started. Target vessels were identified and marked. A crossclamp was then applied. Antegrade and retrograde cardioplegia was administered initially and then given intermittently throughout the case. A latticed heart support was placed to assist with the distal anastomoses, which were performed by using 7-0 Prolene for vein to artery anastomosis and 8-0 Prolene for artery-to-artery anastomosis. Proximal anastomoses to the aorta were also placed using 6-0 Prolene. At the conclusion of the final proximal anastomosis, the aorta and right ventricle were flushed of debris and the anastomosis was completed. The crossclamp was then removed. The heart then underwent meticulous de-airing. The patient was warmed and weaned from the cardiopulmonary bypass. The distal anastomoses were visualized and noted to be hemostatic. All grafts were dopplered and noted to have an excellent flow. The venous cannula was then removed. Ventricular and atrial pacing wires were then placed upon the heart, brought out through stab wounds inferiorly and affixed to the skin. A single straight mediastinal tube was placed, brought out through a separate stab incision inferiorly, and also affixed to the skin.   After meticulous hemostasis was made, the sternum was reapproximated with heavy gauge stainless steel wire. The midline fascia was subsequently closed separately. Vicryl was then used in a running fashion for subcutaneous tissue and skin. Dressings were then applied to all wounds. The patient was then transported with monitors to the intensive care unit intubated and ventilated. COMPLICATIONS:  None. All instruments and sponge counts were correct. ADDENDUM:  After induction of general endotracheal anesthesia, she was positioned, prepped and draped in the usual sterile fashion and using the Quantopian system, three segments of greater saphenous vein were then taken from her left leg. This incision was closed in two-layer fashion with 3-0 and 4-0 Vicryl.       Lorne Shaver MD      [id number]  D:  03/06/2023 12:09  T:  03/06/2023 18:04  JOB #:  [id number]

## 2023-03-06 NOTE — ANESTHESIA PROCEDURE NOTES
Airway  Date/Time: 3/6/2023 7:31 AM  Urgency: elective    Airway not difficult    General Information and Staff    Patient location during procedure: OR  Resident/CRNA: EMILY Coreas CRNA  Performed: resident/CRNA     Indications and Patient Condition  Indications for airway management: anesthesia  Sedation level: deep  Preoxygenated: yes  Patient position: sniffing  MILS not maintained throughout  Mask difficulty assessment: vent by bag mask    Final Airway Details  Final airway type: endotracheal airway      Successful airway: ETT  Cuffed: yes   Successful intubation technique: direct laryngoscopy  Facilitating devices/methods: cricoid pressure  Endotracheal tube insertion site: oral  Blade: Cardoso  Blade size: #3  ETT size (mm): 7.0  Cormack-Lehane Classification: grade I - full view of glottis  Placement verified by: chest auscultation and capnometry   Inital cuff pressure (cm H2O): 25  Measured from: lips  ETT to lips (cm): 21  Number of other approaches attempted: 0    no

## 2023-03-06 NOTE — ANESTHESIA PROCEDURE NOTES
Arterial Line:    An arterial line was placed using ultrasound guidance, in the OR for the following indication(s): continuous blood pressure monitoring and blood sampling needed. A 20 gauge (size) (length), Arrow (type) catheter was placed, Seldinger technique used, into the left radial artery, secured by Tegaderm and tape. Anesthesia type: Local  Local infiltration: Injection    Events:  patient tolerated procedure well with no complications and EBL < 5mL. Additional notes:  Left arm prepped with ChloraPrep, 0.8ml of 1% lidocaine infiltrated at skin, ultrasound guided Seldinger technique, good blood return, good waveform. Potential access sites were examined with ultrasound and acceptable patent access site selected as noted above. Needle path and artery access visualized in real time using ultrasound, an image of wire in vessel recorded for permanent record.     3/6/2023 7:16 AM3/6/2023 7:19 AM  Resident/CRNA: EMILY Fernandes - CRNA  Performed: Resident/CRNA   Preanesthetic Checklist  Completed: patient identified, IV checked, site marked, risks and benefits discussed, surgical/procedural consents, equipment checked, pre-op evaluation, timeout performed, anesthesia consent given, oxygen available, monitors applied/VS acknowledged, fire risk safety assessment completed and verbalized and blood product R/B/A discussed and consented

## 2023-03-06 NOTE — PROGRESS NOTES
TIMEOUT performed prior to extubation on Zack Wilde with RT, primary RN, and charge RN present on 3/6/2023 at 4:12 PM.     Per anesthesia team on admission, patient's intubation was no acute fracture or dislocation    ABG results as follows:    Lab Results   Component Value Date/Time    IBD 0.4 03/06/2023 12:25 PM         The patient is hemodynamically stable and can demonstrate the following on a consistent basis:  follow commands , elevate head off the pillow, nods appropriately to questions. Dr. Lester Rebolledo notified of weaning parameters and order to extubate obtained. Patient extubated by (RT name) Silverio Eisenmenger to (Type of O2 Device) Airvo at (Amount of of O2) 13J/32% without complication.

## 2023-03-06 NOTE — PROGRESS NOTES
All questions answered regarding surgery. Hibiclens bath completed last night and this AM.  Bactroban to nares. NPO p MN. Blood Consent signed and placed on chart. Procedure consent to be signed in Pre-op. Pt has viewed pre-op video. Blood verified with blood bank. Pre-op checklist completed. VSS with BP assessed in both arms. Height and weight updated on chart. AM SQBS 109    Pt demonstrated proper use of Incentive Spirometry and encouraged to use hourly. Pre-op meds administered per MAR. Ancef sent to pre-op with chart. Allevyn placed on sacrum for prevention only per protocol    Pt to be transported on 3L NC to OR. Personal belongings given to family. Family escorted to main surgery waiting area.

## 2023-03-06 NOTE — ANESTHESIA PRE PROCEDURE
Department of Anesthesiology  Preprocedure Note       Name:  Mae Hernandez   Age:  59 y.o.  :  1960                                          MRN:  [id number]         Date:  3/6/2023      Surgeon: Trina Oswald):  Moise Loera MD    Procedure: Procedure(s):  CABG CORONARY ARTERY BYPASS/OR #4/PATIENT IS IP IN ROOM 327    Medications prior to admission:   Prior to Admission medications    Medication Sig Start Date End Date Taking?  Authorizing Provider   levothyroxine (SYNTHROID) 100 MCG tablet Take 100 mcg by mouth Daily   Yes Historical Provider, MD       Current medications:    Current Facility-Administered Medications   Medication Dose Route Frequency Provider Last Rate Last Admin    alcohol 62% (NOZIN) nasal  1 ampule  1 ampule Topical Q12H BARBIE Fox        lidocaine 1 % injection 1 mL  1 mL IntraDERmal Once PRN Betsy Chavez MD        fentaNYL (SUBLIMAZE) injection 100 mcg  100 mcg IntraVENous Once PRN Betsy Chavez MD        lactated ringers IV soln infusion   IntraVENous Continuous Betsy Chavez  mL/hr at 23 0548 New Bag at 23 0548    sodium chloride flush 0.9 % injection 5-40 mL  5-40 mL IntraVENous 2 times per day Betsy Chavez MD        sodium chloride flush 0.9 % injection 5-40 mL  5-40 mL IntraVENous PRN Betsy Chavez MD        0.9 % sodium chloride infusion   IntraVENous PRN Betsy Chavez MD        midazolam PF (VERSED) injection 2 mg  2 mg IntraVENous Once PRN Betsy Chavez MD        ceFAZolin (ANCEF) 2000 mg in sterile water 20 mL IV syringe  2,000 mg IntraVENous On Call to [address] [place] [address]        famotidine (PEPCID) tablet 20 mg  20 mg Oral BID BARBIE Madrid   20 mg at 23 1553    metoprolol tartrate (LOPRESSOR) tablet 12.5 mg  12.5 mg Oral BID EMILY Mckeon - CNP   12.5 mg at 23 1149    amiodarone (CORDARONE) tablet 600 mg  600 mg Oral BID Moise Loera MD 600 mg at 03/06/23 0333   • sodium chloride flush 0.9 % injection 5-40 mL  5-40 mL IntraVENous 2 times per day EMILY Thornton CNP   10 mL at 03/05/23 2025   • sodium chloride flush 0.9 % injection 5-40 mL  5-40 mL IntraVENous PRN EMILY Thornton CNP       • ondansetron (ZOFRAN) injection 4 mg  4 mg IntraVENous Q4H PRN EMILY Thornton CNP       • acetaminophen (TYLENOL) tablet 650 mg  650 mg Oral Q6H PRN EMILY Thornton CNP        Or   • acetaminophen (TYLENOL) suppository 650 mg  650 mg Rectal Q6H PRN EMILY Thornton CNP       • polyethylene glycol (GLYCOLAX) packet 17 g  17 g Oral Daily PRN EMILY Thornton CNP       • nitroGLYCERIN (NITROSTAT) SL tablet 0.4 mg  0.4 mg SubLINGual Q5 Min PRN EMILY Thornton CNP   0.4 mg at 03/03/23 0725   • potassium chloride (KLOR-CON M) extended release tablet 40 mEq  40 mEq Oral PRN EMILY Thornton CNP        Or   • potassium bicarb-citric acid (EFFER-K) effervescent tablet 40 mEq  40 mEq Oral PRN EMILY Thornton CNP        Or   • potassium chloride 10 mEq/100 mL IVPB (Peripheral Line)  10 mEq IntraVENous PRN EMILY Thornton CNP       • magnesium sulfate 2000 mg in 50 mL IVPB premix  2,000 mg IntraVENous PRN EMILY Thornton CNP       • nitroglycerin (NITRO-BID) 2 % ointment 1 inch  1 inch Topical Q6H PRN EMILY Thornton CNP   1 inch at 03/03/23 0733   • levothyroxine (SYNTHROID) tablet 100 mcg  100 mcg Oral Daily EMILY Thornton CNP   100 mcg at 03/06/23 0333   • rosuvastatin (CRESTOR) tablet 40 mg  40 mg Oral Nightly Rashad Trimble MD   40 mg at 03/05/23 2023   • aspirin EC tablet 81 mg  81 mg Oral Daily Gibson Staley MD   81 mg at 03/06/23 0333   • heparin (porcine) injection 4,000 Units  4,000 Units IntraVENous PRN BARBIE Rubio       • heparin (porcine) injection 2,000 Units  2,000 Units IntraVENous PRN Siri SAEZ  BARBIE Hammer   2,000 Units at 03/04/23 1425    heparin 25,000 units in dextrose 5% 250 mL (premix) infusion  5-30 Units/kg/hr IntraVENous Continuous Venora Gilford, PA 12.6 mL/hr at 03/05/23 1856 16 Units/kg/hr at 03/05/23 1856    ipratropium-albuterol (DUONEB) nebulizer solution 1 ampule  1 ampule Inhalation Q4H PRN EMILY Betancourt - NP        LORazepam (ATIVAN) tablet 0.5 mg  0.5 mg Oral Q4H PRN Slime Browne APRN - NP           Allergies: Allergies   Allergen Reactions    Pcn [Penicillins] Nausea And Vomiting       Problem List:    Patient Active Problem List   Diagnosis Code    Cigarette smoker F17.210    Familial hypercholesteremia E78.01    Hypothyroid E03.9    Chest pain R07.9    Hypertension I10    Elevated LFTs R79.89    Atherosclerotic heart disease of native coronary artery with unstable angina pectoris ([facility] [address] I25.110       Past Medical History:        Diagnosis Date    Hyperlipidemia     Hypothyroid        Past Surgical History:        Procedure Laterality Date    CHOLECYSTECTOMY      GYN      hysterectomy       Social History:    Social History     Tobacco Use    Smoking status: Every Day     Packs/day: 1.00     Types: Cigarettes    Smokeless tobacco: Never   Substance Use Topics    Alcohol use:  No                                Ready to quit: Not Answered  Counseling given: Not Answered      Vital Signs (Current):   Vitals:    03/06/23 0102 03/06/23 0336 03/06/23 0337 03/06/23 0541   BP: 105/62 119/77 113/73 (!) 105/55   Pulse: (!) 46 51  (!) 44   Resp: 18 18  16   Temp: 97.7 °F (36.5 °C) 97.5 °F (36.4 °C)  98 °F (36.7 °C)   TempSrc: Oral Oral     SpO2: 95% 97%  98%   Weight:  171 lb 8 oz (77.8 kg)     Height:                                                  BP Readings from Last 3 Encounters:   03/06/23 (!) 105/55   03/03/23 136/76       NPO Status: Time of last liquid consumption: 0330 (water)                        Time of last solid consumption: 2100 Date of last liquid consumption: 03/06/23                        Date of last solid food consumption: 03/05/23    BMI:   Wt Readings from Last 3 Encounters:   03/06/23 171 lb 8 oz (77.8 kg)   03/03/23 165 lb (74.8 kg)     Body mass index is 26.08 kg/m².     CBC:   Lab Results   Component Value Date/Time    WBC 6.4 03/05/2023 03:59 AM    RBC 4.24 03/05/2023 03:59 AM    HGB 12.9 03/05/2023 03:59 AM    HCT 40.4 03/05/2023 03:59 AM    MCV 95.3 03/05/2023 03:59 AM    RDW 14.6 03/05/2023 03:59 AM     03/05/2023 03:59 AM       CMP:   Lab Results   Component Value Date/Time     03/04/2023 06:08 AM    K 3.9 03/04/2023 06:08 AM     03/04/2023 06:08 AM    CO2 26 03/04/2023 06:08 AM    BUN 14 03/04/2023 06:08 AM    CREATININE 0.70 03/04/2023 06:08 AM    GFRAA 103 07/16/2020 09:45 AM    AGRATIO 2.0 07/16/2020 09:45 AM    LABGLOM >60 03/04/2023 06:08 AM    GLUCOSE 108 03/04/2023 06:08 AM    PROT 7.9 03/03/2023 12:19 AM    CALCIUM 8.5 03/04/2023 06:08 AM    BILITOT 0.3 03/03/2023 12:19 AM    ALKPHOS 139 03/03/2023 12:19 AM    ALKPHOS 110 07/16/2020 09:45 AM    AST 47 03/03/2023 12:19 AM    ALT 64 03/03/2023 12:19 AM       POC Tests:   Recent Labs     03/06/23  0342   POCGLU 109*       Coags:   Lab Results   Component Value Date/Time    PROTIME 13.5 03/04/2023 06:08 AM    INR 1.0 03/04/2023 06:08 AM    APTT 47.3 03/03/2023 04:06 PM       HCG (If Applicable): No results found for: PREGTESTUR, PREGSERUM, HCG, HCGQUANT     ABGs: No results found for: PHART, PO2ART, LLM0FOM, CYW7YOE, BEART, G4UXEQUM     Type & Screen (If Applicable):  No results found for: LABABO, LABRH    Drug/Infectious Status (If Applicable):  Lab Results   Component Value Date/Time    HEPCAB <0.1 09/10/2019 07:53 AM       COVID-19 Screening (If Applicable): No results found for: COVID19        Anesthesia Evaluation    Airway: Mallampati: II  TM distance: >3 FB   Neck ROM: full  Mouth opening: > = 3 FB   Dental: normal exam   (+) poor dentition  Comment: Several cracked and chipped teeth    Pulmonary:normal exam  breath sounds clear to auscultation  (+) current smoker                           Cardiovascular:  Exercise tolerance: poor (<4 METS),   (+) hypertension:, angina: with exertion, CAD:,         Rhythm: regular  Rate: abnormal                 ROS comment: Cath 3/23:    Severe multivessel coronary artery disease, 40% LM    Normal LV systolic function    Echo 3/23:    Left Ventricle: Normal left ventricular systolic function with a visually estimated EF of 60 - 65%. Left ventricle size is normal. Normal wall thickness. Normal wall motion. Normal diastolic function.   Tricuspid Valve: The estimated RVSP is 21 mmHg      PE comment: [email or web address]   Neuro/Psych:               GI/Hepatic/Renal:             Endo/Other:    (+) hypothyroidism::., .                 Abdominal:             Vascular:   + PVD, aortic or cerebral, . ROS comment: 3/23 US:  DIEGO:  Elevated velocity within the proximal right internal carotid artery  consistent with 50-69% stenosis. .     LICA:  No evidence of hemodynamically significant stenosis. .  . Other Findings:           Anesthesia Plan      general     ASA 4       Induction: intravenous. arterial line, CVP, BIS and NIDIA  MIPS: Postoperative ventilation. Anesthetic plan and risks discussed with patient.                         Ibeth Rodas MD   3/6/2023

## 2023-03-06 NOTE — PROCEDURES
TRANSFER - OUT REPORT:    Verbal report given to *** on Doylene Shall  being transferred to *** for {Transfer EMWV:35029}       Report consisted of patient's Situation, Background, Assessment and   Recommendations(SBAR). Information from the following report(s) {SBAR Reports List:93005} was reviewed with the receiving nurse. Secretary Assessment: No data recorded  Lines:   Peripheral IV 03/04/23 Left Antecubital (Active)   Site Assessment Clean, dry & intact 03/06/23 0400   Line Status Infusing 03/06/23 0400   Line Care Connections checked and tightened 03/06/23 0400   Phlebitis Assessment No symptoms 03/06/23 0400   Infiltration Assessment 0 03/06/23 0400   Alcohol Cap Used Yes 03/06/23 0400   Dressing Status Clean, dry & intact 03/06/23 0400   Dressing Type Transparent 03/06/23 0400   Dressing Intervention New 03/04/23 1300        Opportunity for questions and clarification was provided.       Patient transported with:  {Transport Details:28196}

## 2023-03-06 NOTE — PERIOP NOTE
TRANSFER - OUT REPORT:    Verbal report given to Livier Nicholson RN on Lanec Brown  being transferred to CVICU for routine progression of patient care       Report consisted of patient's Situation, Background, Assessment and   Recommendations(SBAR). Information from the following report(s) Nurse Handoff Report and Surgery Report was reviewed with the receiving nurse. [place] Assessment: No data recorded  Lines:   Peripheral IV 03/04/23 Left Antecubital (Active)   Site Assessment Clean, dry & intact 03/06/23 0555   Line Status Blood return noted; Infusing 03/06/23 0555   Line Care Connections checked and tightened 03/06/23 0400   Phlebitis Assessment No symptoms 03/06/23 0555   Infiltration Assessment 0 03/06/23 0555   Alcohol Cap Used Yes 03/06/23 0400   Dressing Status Clean, dry & intact 03/06/23 0555   Dressing Type Transparent 03/06/23 0555   Dressing Intervention New 03/04/23 1300       Arterial Line 03/06/23 Radial (Active)       Introducer 03/06/23 (Active)        Opportunity for questions and clarification was provided.       Patient transported with:  Monitor, O2 @ 10 lpm, and Registered Nurse, CRNA, NILES

## 2023-03-06 NOTE — ANESTHESIA PROCEDURE NOTES
Procedure Performed: NIDIA       Start Time:  3/6/2023 7:58 AM       End Time:   3/6/2023 8:12 AM    Preanesthesia Checklist:  Patient identified, IV assessed, risks and benefits discussed, monitors and equipment assessed, procedure being performed at surgeon's request and anesthesia consent obtained. General Procedure Information  Diagnostic Indications for Echo:  hemodynamic monitoring  Location performed:  OR  Intubated  Bite block placed  Heart visualized  Probe Insertion:  Easy  Probe Type:  Mulitplane  Modalities:  2D only, pulse wave Doppler and color flow mapping    Echocardiographic and Doppler Measurements    Ventricles    Right Ventricle:  Global function normal.    Left Ventricle: Thrombus not present. Global Function normal.          Valves    Aortic Valve: Annulus normal.  Regurgitation none. Leaflets normal.  Leaflet motions normal.      Mitral Valve:  Regurgitation mild. Leaflets normal.  Leaflet motions normal.          Aorta    Descending Aorta:  Size normal.  Dissection not present.                         Anesthesia Information  Performed Personally  Anesthesiologist:  Katelyn Beaulieu MD    Post Intervention Follow-up Study  None

## 2023-03-06 NOTE — BRIEF OP NOTE
Brief Postoperative Note      Patient: Albino Apple  YOB: 1960  MRN: [id number]    Date of Procedure: 3/6/2023    Pre-Op Diagnosis: Atherosclerotic heart disease of native coronary artery with unstable angina pectoris ([facility] [address] [I25.110]    Post-Op Diagnosis: Same       Procedure(s):  CORONARY ARTERY BYPASS GRAFT (CABG X 4), LIMA; ENDOSCOPIC VEIN HARVEST, LEFT GREATER SAPHENOUS VEIN  TRANSESOPHAGEAL ECHOCARDIOGRAM    Surgeon(s):  Shira Leiva MD    Assistant:  * No surgical staff found *    Anesthesia: General    Estimated Blood Loss (mL): Minimal    Complications: None    Specimens:   * No specimens in log *    Implants:  * No implants in log *      Drains:   Chest Tube Other (Comment) Mediastinal 1 (Active)       NG/OG/NJ/NE Tube Orogastric 18 fr (Active)       Urinary Catheter 03/06/23 2 Way; Crawford-Temperature (Active)       Findings: cad    Electronically signed by Ventura Delaney MD on 3/6/2023 at 11:58 AM

## 2023-03-06 NOTE — ANESTHESIA PROCEDURE NOTES
Central Venous Line:    A central venous line was placed using ultrasound guidance, in the OR for the following indication(s): central venous access, CVP monitoring and vasoactive infusions. 3/6/2023 7:34 AM3/6/2023 7:52 AM    Sterility preparation included the following: hand hygiene performed prior to procedure, maximum sterile barriers used and sterile technique used to drape from head to toe. The patient was placed in Trendelenburg position. The right internal jugular vein was prepped. The site was prepped with Chloraprep. A 9 Fr (size), 12 (length), introducer SLIC was placed. During the procedure, the following specific steps were taken: target vein identified, needle advanced into vein and blood aspirated and guidewire advanced into vein. Intravenous verification was obtained by ultrasound, venous blood return and manometry. Post insertion care included: all ports aspirated, all ports flushed easily, guidewire removed intact, line sutured in place and dressing applied. Outcomes: uncomplicated and patient tolerated procedure well  Real-time US image taken/store: yes  Anesthesia type: general    Additional notes:  Vein accessed on first stick with real time ultrasound guidance. Guidewire visualized in vein on ultrasound. Easy catheter insertion. Chest x-ray to be performed in ICU. Seven step protocol followed. Potential access sites were examined with ultrasound and the acceptable patent access site was selected. The needle path and vessel access were visualized in real time using ultrasonography, an image of the wire in the vessel was recorded for the permanent record.   Staffing  Performed: Anesthesiologist   Anesthesiologist: Ifeoma Batres MD  Preanesthetic Checklist  Completed: patient identified, IV checked, risks and benefits discussed, equipment checked, pre-op evaluation, timeout performed, anesthesia consent given, oxygen available and monitors applied/VS acknowledged

## 2023-03-06 NOTE — PROGRESS NOTES
Cardiovascular ICU Consult Note: 3/6/2023  Lance Brown                                                     Admission Date: 3/3/2023     The patient's chart is reviewed and the patient is discussed with the staff. Subjective:     Patient is seen at the request of Solomon Liu MD  for respiratory management status post cardiac surgery - CABG x 4  Patient had Chest pain. Currently is sedated in CV-ICU and orally intubated receiving  mechanical ventilation. We have been asked to see in the CV-ICU for mechanical ventilation management and weaning. Patient is a 59 y.o.  female seen and evaluated at the request of Dr. Jenny Quiñones prior to CABG. Patient has a PMH of HLD (has been intolerant to treatment with myalgias in past), fatty liver, and hypothyroid. She is a current smoker, smokes up to 1 ppd for 45 years. She says now she usually only smokes 4-5 total cigarettes. She presented to ED on 3/2 with chest pain/shoulder pain that started that day while she was at work. Cardiology admitted patient with plans for LHC. Troponins were negative. EKG showed SR with diffuse inverted T waves. LHC completed on 3/3 and showed multivessel disease with plans for CABG. She denies any history of COPD/emphysema. Does not use O2 at home. No inhaler or nebulizer use. States she doesn't like to take medications. Says she has some shortness of breath daily with congested cough occasionally. Seen immediately after her LHC and she is quite anxious and irritable. Wants to go smoke.      Current Outpatient Medications   Medication Instructions    levothyroxine (SYNTHROID) 100 mcg, Oral, DAILY      Review of Systems: unable to determine due to intubated status  Past Medical History:   Diagnosis Date    Hyperlipidemia     Hypothyroid      Past Surgical History:   Procedure Laterality Date    CARDIAC PROCEDURE N/A 3/3/2023    LEFT HEART CATH / CORONARY ANGIOGRAPHY performed by Colonel Bolivar MD at [facility] CARDIAC CATH LAB    CHOLECYSTECTOMY      GYN      hysterectomy     Social History     Socioeconomic History    Marital status:      Spouse name: Not on file    Number of children: Not on file    Years of education: Not on file    Highest education level: Not on file   Occupational History    Not on file   Tobacco Use    Smoking status: Every Day     Packs/day: 1.00     Types: Cigarettes    Smokeless tobacco: Never   Substance and Sexual Activity    Alcohol use: No    Drug use: No    Sexual activity: Not on file   Other Topics Concern    Not on file   Social History Narrative    Not on file     Social Determinants of Health     Financial Resource Strain: Not on file   Food Insecurity: Not on file   Transportation Needs: Not on file   Physical Activity: Not on file   Stress: Not on file   Social Connections: Not on file   Intimate Partner Violence: Not on file   Housing Stability: Not on file     Family History   Problem Relation Age of Onset    No Known Problems Sister     No Known Problems Brother      Allergies   Allergen Reactions    Pcn [Penicillins] Nausea And Vomiting     Objective:   Blood pressure 128/69, pulse 59, temperature 97.2 °F (36.2 °C), temperature source Bladder, resp. rate 18, height 5' 7.99\" (1.727 m), weight 171 lb 8 oz (77.8 kg), SpO2 99 %. Intake/Output Summary (Last 24 hours) at 3/6/2023 1328  Last data filed at 3/6/2023 1306  Gross per 24 hour   Intake 2100 ml   Output 1245 ml   Net 855 ml     Physical Exam:          Constitutional:  Sedated, orally intubated and mechanically ventilated. EENMT:  Sclera clear, pupils equal, oral mucosa moist and orally intubated  Respiratory: mild corse sounds. No wheezing  Cardiovascular:  RRR with no M,G,R;  Gastrointestinal:  soft; + bowel sounds present  Musculoskeletal:  warm with no cyanosis, no lower extremity edema. Rover site left leg with ace wrap. Sedated with no movements.   SKIN:  no jaundice or ecchymosis   Neurologic:  sedated but no gross neuro deficits  Psychiatric:  sedated and unable to assess at this time    CXR:  ETT noted and normal post op changes        LINES:  ETT, basilio, swan prem, arterial line, chest tubes times x 1 in epigastric area without air leak. DRIPS:   Dose (mcg/kg/hr) Dexmedetomidine: 0.3 mcg/kg/hr  Dose (mcg/min) Epinephrine: 0 mcg/min (Not Infusing upon arrival from CVOR)  Dose (mg/hr) Nicardipine: *0.2 mg  Dose (units/hr) Heparin: 1259.2 Units/hr  Dose (units/hr) Insulin : 1.95 Units/hr   Insulin  amicar    CI:  CO (l/min): 4 l/min  CI (l/min/m2): 2.1 l/min/m2    Ventilator Settings:  Ideal body weight: 63.9 kg (140 lb 13.3 oz)  Adjusted ideal body weight: 69.4 kg (153 lb 1.6 oz)  Mode FIO2 Rate Tidal Volume Pressure   SIMV/PRVC    100 %  18 bmp         8     Peak airway pressure:     Minute ventilation:    Recent Labs     03/06/23  1011 03/06/23  1026 03/06/23  1044 03/06/23  1125 03/06/23  1225   PHAPOC 7.45 7.44 7.43 7.40 7.42   TDS9UULL 36.9 38.3 39.0 37.1 36.8   VF0ACDC 304* 285* 260* 155* 193*   MKI1MQY 25.5 25.8 25.9 23.1 23.9   BE 1.4 1.6 1.5  --   --      Recent Labs     03/03/23  1606 03/04/23  0608 03/05/23  0359 03/06/23  1233   WBC 7.8 7.1 6.4 9.1   HGB 13.5 13.1 12.9 11.2*   HCT 43.1 40.8 40.4 33.8*    205 208 145*   INR 0.9 1.0  --  1.1     Recent Labs     03/04/23  0608 03/06/23  1233    142   K 3.9 3.8    115*   CO2 26 25   BUN 14 14   CREATININE 0.70 0.80   MG 2.8* 3.3*     No results for input(s): PROCAL, LACTATE in the last 72 hours. 03/03/23    TRANSTHORACIC ECHOCARDIOGRAM (TTE) COMPLETE (CONTRAST/BUBBLE/3D PRN) 03/03/2023 12:03 PM, 03/03/2023 12:00 AM (Final)    Interpretation Summary    Left Ventricle: Normal left ventricular systolic function with a visually estimated EF of 60 - 65%. Left ventricle size is normal. Normal wall thickness. Normal wall motion. Normal diastolic function. Tricuspid Valve: The estimated RVSP is 21 mmHg.     Technical qualifiers: Color flow Doppler was performed and pulse wave and/or continuous wave Doppler was performed. Signed by: Antonio Londono MD on 3/3/2023 12:03 PM, Signed by: Unknown Provider Result on 3/3/2023 12:00 AM    Assessment and Plan :  (Medical Decision Making)   Impression: 59 y.o. y/o female s/p CV surgery for Chest pain    Encounter for weaning from Ventilator:  Post op ABG and CXR review. Continue with weaning per protocol. Hypoxemia:   Once weaned from ventilator will work on IS, mobility and weaning O2. Bronchodilators per protocol. Atelectasis:   IS, mobility after extubated. S/P Cardiovascular surgery:  S/p CABG x 4  Monitor chest tube output, removal per surgery. Agitation  --increased precedex now to 1.0 and taper after that. Restless at this time, when trying to wake up. Tobacco Abuse  --will need evaluation for COPD in the future. --continue BD for now. More than 50% of the time documented was spent in face-to-face contact with the patient and in the care of the patient on the floor/unit where the patient is located. Thank you for this referral.  We appreciate the opportunity to participate in this patient's care. Will follow along with you.     Mae Thomas MD

## 2023-03-07 ENCOUNTER — APPOINTMENT (OUTPATIENT)
Dept: GENERAL RADIOLOGY | Age: 63
DRG: 234 | End: 2023-03-07
Attending: INTERNAL MEDICINE
Payer: COMMERCIAL

## 2023-03-07 LAB
ACT AVERAGE - AAVG: 538 SEC
ACT AVERAGE - AAVG: 563 SEC
ANION GAP SERPL CALC-SCNC: 2 MMOL/L (ref 2–11)
BUN SERPL-MCNC: 14 MG/DL (ref 8–23)
CALCIUM SERPL-MCNC: 8 MG/DL (ref 8.3–10.4)
CHLORIDE SERPL-SCNC: 113 MMOL/L (ref 101–110)
CO2 SERPL-SCNC: 24 MMOL/L (ref 21–32)
CREAT SERPL-MCNC: 0.8 MG/DL (ref 0.6–1)
EKG ATRIAL RATE: 64 BPM
EKG DIAGNOSIS: NORMAL
EKG P AXIS: 39 DEGREES
EKG P-R INTERVAL: 148 MS
EKG Q-T INTERVAL: 506 MS
EKG QRS DURATION: 82 MS
EKG QTC CALCULATION (BAZETT): 522 MS
EKG R AXIS: 22 DEGREES
EKG T AXIS: 13 DEGREES
EKG VENTRICULAR RATE: 64 BPM
ERYTHROCYTE [DISTWIDTH] IN BLOOD BY AUTOMATED COUNT: 14.8 % (ref 11.9–14.6)
GLUCOSE BLD STRIP.AUTO-MCNC: 102 MG/DL (ref 65–100)
GLUCOSE BLD STRIP.AUTO-MCNC: 105 MG/DL (ref 65–100)
GLUCOSE BLD STRIP.AUTO-MCNC: 107 MG/DL (ref 65–100)
GLUCOSE BLD STRIP.AUTO-MCNC: 109 MG/DL (ref 65–100)
GLUCOSE BLD STRIP.AUTO-MCNC: 109 MG/DL (ref 65–100)
GLUCOSE BLD STRIP.AUTO-MCNC: 112 MG/DL (ref 65–100)
GLUCOSE BLD STRIP.AUTO-MCNC: 114 MG/DL (ref 65–100)
GLUCOSE BLD STRIP.AUTO-MCNC: 117 MG/DL (ref 65–100)
GLUCOSE BLD STRIP.AUTO-MCNC: 118 MG/DL (ref 65–100)
GLUCOSE BLD STRIP.AUTO-MCNC: 119 MG/DL (ref 65–100)
GLUCOSE BLD STRIP.AUTO-MCNC: 126 MG/DL (ref 65–100)
GLUCOSE SERPL-MCNC: 117 MG/DL (ref 65–100)
HCT VFR BLD AUTO: 32.2 % (ref 35.8–46.3)
HEPARIN REQUIRED-PATIENT: 0
HEPARIN REQUIRED-PATIENT: 0
HEPARIN REQUIRED-TOTAL: 0 UNITS
HEPARIN REQUIRED-TOTAL: 0 UNITS
HEPARIN TEST CONCENTRATE: 3.5 MG/KG
HEPARIN TEST CONCENTRATE: 3.5 MG/KG
HGB BLD-MCNC: 10.2 G/DL (ref 11.7–15.4)
MAGNESIUM SERPL-MCNC: 2.5 MG/DL (ref 1.8–2.4)
MCH RBC QN AUTO: 30.4 PG (ref 26.1–32.9)
MCHC RBC AUTO-ENTMCNC: 31.7 G/DL (ref 31.4–35)
MCV RBC AUTO: 95.8 FL (ref 82–102)
MM INDURATION, POC: 0 MM (ref 0–5)
NRBC # BLD: 0 K/UL (ref 0–0.2)
PLATELET # BLD AUTO: 144 K/UL (ref 150–450)
PMV BLD AUTO: 11.5 FL (ref 9.4–12.3)
POTASSIUM SERPL-SCNC: 4.5 MMOL/L (ref 3.5–5.1)
PPD, POC: NEGATIVE
PROTAMINE DOSE-PUMP: 48 MG
PROTAMINE DOSE-PUMP: 48 MG
PROTAMINE DOSE-TOTAL: 270 MG
PROTAMINE DOSE-TOTAL: 270 MG
RBC # BLD AUTO: 3.36 M/UL (ref 4.05–5.2)
SERVICE CMNT-IMP: ABNORMAL
SODIUM SERPL-SCNC: 139 MMOL/L (ref 133–143)
WBC # BLD AUTO: 8.9 K/UL (ref 4.3–11.1)

## 2023-03-07 PROCEDURE — 71045 X-RAY EXAM CHEST 1 VIEW: CPT

## 2023-03-07 PROCEDURE — 6370000000 HC RX 637 (ALT 250 FOR IP): Performed by: NURSE PRACTITIONER

## 2023-03-07 PROCEDURE — 97530 THERAPEUTIC ACTIVITIES: CPT

## 2023-03-07 PROCEDURE — 94640 AIRWAY INHALATION TREATMENT: CPT

## 2023-03-07 PROCEDURE — 2580000003 HC RX 258: Performed by: PHYSICIAN ASSISTANT

## 2023-03-07 PROCEDURE — 6360000002 HC RX W HCPCS: Performed by: PHYSICIAN ASSISTANT

## 2023-03-07 PROCEDURE — 2140000001 HC CVICU INTERMEDIATE R&B

## 2023-03-07 PROCEDURE — 82962 GLUCOSE BLOOD TEST: CPT

## 2023-03-07 PROCEDURE — 94761 N-INVAS EAR/PLS OXIMETRY MLT: CPT

## 2023-03-07 PROCEDURE — 6370000000 HC RX 637 (ALT 250 FOR IP): Performed by: INTERNAL MEDICINE

## 2023-03-07 PROCEDURE — 37799 UNLISTED PX VASCULAR SURGERY: CPT

## 2023-03-07 PROCEDURE — 2580000003 HC RX 258: Performed by: NURSE PRACTITIONER

## 2023-03-07 PROCEDURE — 83735 ASSAY OF MAGNESIUM: CPT

## 2023-03-07 PROCEDURE — 6370000000 HC RX 637 (ALT 250 FOR IP): Performed by: PHYSICIAN ASSISTANT

## 2023-03-07 PROCEDURE — 85027 COMPLETE CBC AUTOMATED: CPT

## 2023-03-07 PROCEDURE — 97162 PT EVAL MOD COMPLEX 30 MIN: CPT

## 2023-03-07 PROCEDURE — 99232 SBSQ HOSP IP/OBS MODERATE 35: CPT | Performed by: INTERNAL MEDICINE

## 2023-03-07 PROCEDURE — 6370000000 HC RX 637 (ALT 250 FOR IP): Performed by: THORACIC SURGERY (CARDIOTHORACIC VASCULAR SURGERY)

## 2023-03-07 PROCEDURE — 2700000000 HC OXYGEN THERAPY PER DAY

## 2023-03-07 PROCEDURE — 80048 BASIC METABOLIC PNL TOTAL CA: CPT

## 2023-03-07 PROCEDURE — 6360000002 HC RX W HCPCS: Performed by: THORACIC SURGERY (CARDIOTHORACIC VASCULAR SURGERY)

## 2023-03-07 PROCEDURE — 6360000002 HC RX W HCPCS: Performed by: INTERNAL MEDICINE

## 2023-03-07 RX ORDER — FUROSEMIDE 10 MG/ML
20 INJECTION INTRAMUSCULAR; INTRAVENOUS ONCE
Status: COMPLETED | OUTPATIENT
Start: 2023-03-07 | End: 2023-03-07

## 2023-03-07 RX ORDER — TRAMADOL HYDROCHLORIDE 50 MG/1
50 TABLET ORAL EVERY 6 HOURS PRN
Status: DISCONTINUED | OUTPATIENT
Start: 2023-03-07 | End: 2023-03-09

## 2023-03-07 RX ORDER — OXYCODONE HYDROCHLORIDE AND ACETAMINOPHEN 5; 325 MG/1; MG/1
1 TABLET ORAL EVERY 4 HOURS PRN
Status: DISCONTINUED | OUTPATIENT
Start: 2023-03-07 | End: 2023-03-08

## 2023-03-07 RX ADMIN — Medication 1 AMPULE: at 21:18

## 2023-03-07 RX ADMIN — IPRATROPIUM BROMIDE AND ALBUTEROL SULFATE 1 AMPULE: .5; 3 SOLUTION RESPIRATORY (INHALATION) at 07:17

## 2023-03-07 RX ADMIN — BUDESONIDE 500 MCG: 0.5 INHALANT RESPIRATORY (INHALATION) at 07:17

## 2023-03-07 RX ADMIN — SODIUM CHLORIDE, PRESERVATIVE FREE 10 ML: 5 INJECTION INTRAVENOUS at 09:26

## 2023-03-07 RX ADMIN — IPRATROPIUM BROMIDE AND ALBUTEROL SULFATE 1 AMPULE: .5; 3 SOLUTION RESPIRATORY (INHALATION) at 16:14

## 2023-03-07 RX ADMIN — OXYCODONE AND ACETAMINOPHEN 1 TABLET: 5; 325 TABLET ORAL at 08:05

## 2023-03-07 RX ADMIN — AMIODARONE HYDROCHLORIDE 200 MG: 200 TABLET ORAL at 09:25

## 2023-03-07 RX ADMIN — ASPIRIN 81 MG: 81 TABLET ORAL at 09:24

## 2023-03-07 RX ADMIN — BUDESONIDE 500 MCG: 0.5 INHALANT RESPIRATORY (INHALATION) at 19:57

## 2023-03-07 RX ADMIN — Medication 2000 MG: at 03:56

## 2023-03-07 RX ADMIN — LEVOTHYROXINE SODIUM 100 MCG: 0.1 TABLET ORAL at 07:36

## 2023-03-07 RX ADMIN — SODIUM CHLORIDE, PRESERVATIVE FREE 5 ML: 5 INJECTION INTRAVENOUS at 21:00

## 2023-03-07 RX ADMIN — Medication 1 AMPULE: at 09:24

## 2023-03-07 RX ADMIN — OXYCODONE AND ACETAMINOPHEN 1 TABLET: 5; 325 TABLET ORAL at 14:32

## 2023-03-07 RX ADMIN — FAMOTIDINE 20 MG: 20 TABLET, FILM COATED ORAL at 09:25

## 2023-03-07 RX ADMIN — SODIUM CHLORIDE, PRESERVATIVE FREE 10 ML: 5 INJECTION INTRAVENOUS at 09:27

## 2023-03-07 RX ADMIN — KETOROLAC TROMETHAMINE 15 MG: 15 INJECTION, SOLUTION INTRAMUSCULAR; INTRAVENOUS at 05:33

## 2023-03-07 RX ADMIN — ONDANSETRON 4 MG: 2 INJECTION INTRAMUSCULAR; INTRAVENOUS at 12:36

## 2023-03-07 RX ADMIN — FUROSEMIDE 20 MG: 10 INJECTION, SOLUTION INTRAMUSCULAR; INTRAVENOUS at 10:12

## 2023-03-07 RX ADMIN — METOPROLOL TARTRATE 12.5 MG: 25 TABLET, FILM COATED ORAL at 21:20

## 2023-03-07 RX ADMIN — IPRATROPIUM BROMIDE AND ALBUTEROL SULFATE 1 AMPULE: .5; 3 SOLUTION RESPIRATORY (INHALATION) at 11:17

## 2023-03-07 RX ADMIN — TRAMADOL HYDROCHLORIDE 50 MG: 50 TABLET ORAL at 15:36

## 2023-03-07 RX ADMIN — OXYCODONE AND ACETAMINOPHEN 1 TABLET: 5; 325 TABLET ORAL at 23:34

## 2023-03-07 RX ADMIN — ROSUVASTATIN CALCIUM 40 MG: 20 TABLET, COATED ORAL at 21:18

## 2023-03-07 RX ADMIN — AMIODARONE HYDROCHLORIDE 200 MG: 200 TABLET ORAL at 21:20

## 2023-03-07 RX ADMIN — FAMOTIDINE 20 MG: 20 TABLET, FILM COATED ORAL at 21:18

## 2023-03-07 RX ADMIN — IPRATROPIUM BROMIDE AND ALBUTEROL SULFATE 1 AMPULE: .5; 3 SOLUTION RESPIRATORY (INHALATION) at 19:57

## 2023-03-07 ASSESSMENT — PAIN DESCRIPTION - LOCATION
LOCATION: INCISION;CHEST
LOCATION: INCISION;CHEST
LOCATION: INCISION
LOCATION: CHEST
LOCATION: CHEST
LOCATION: CHEST;INCISION
LOCATION: INCISION;CHEST

## 2023-03-07 ASSESSMENT — PAIN DESCRIPTION - ONSET
ONSET: ON-GOING

## 2023-03-07 ASSESSMENT — PAIN DESCRIPTION - ORIENTATION
ORIENTATION: ANTERIOR;MID
ORIENTATION: MID;ANTERIOR
ORIENTATION: MID;ANTERIOR
ORIENTATION: MID
ORIENTATION: ANTERIOR;MID

## 2023-03-07 ASSESSMENT — PAIN - FUNCTIONAL ASSESSMENT
PAIN_FUNCTIONAL_ASSESSMENT: ACTIVITIES ARE NOT PREVENTED

## 2023-03-07 ASSESSMENT — PAIN SCALES - GENERAL
PAINLEVEL_OUTOF10: 3
PAINLEVEL_OUTOF10: 0
PAINLEVEL_OUTOF10: 5
PAINLEVEL_OUTOF10: 5
PAINLEVEL_OUTOF10: 4
PAINLEVEL_OUTOF10: 7
PAINLEVEL_OUTOF10: 7
PAINLEVEL_OUTOF10: 4
PAINLEVEL_OUTOF10: 0
PAINLEVEL_OUTOF10: 9

## 2023-03-07 ASSESSMENT — PAIN DESCRIPTION - FREQUENCY
FREQUENCY: INTERMITTENT
FREQUENCY: CONTINUOUS

## 2023-03-07 ASSESSMENT — PAIN DESCRIPTION - DESCRIPTORS
DESCRIPTORS: SORE
DESCRIPTORS: SORE
DESCRIPTORS: ACHING;SORE
DESCRIPTORS: ACHING;SORE
DESCRIPTORS: ACHING
DESCRIPTORS: ACHING;SHARP

## 2023-03-07 ASSESSMENT — PAIN DESCRIPTION - PAIN TYPE
TYPE: SURGICAL PAIN

## 2023-03-07 NOTE — PROGRESS NOTES
[facility] CARDIOLOGY PROGRESS NOTE           3/7/2023 12:48 PM    Admit Date: 3/3/2023      Subjective:   No cp or sob    bjective:      Vitals:    03/07/23 1102 03/07/23 1120 03/07/23 1132 03/07/23 1202   BP: (!) 100/53  (!) 102/54 (!) 96/53   Pulse: 64  65 67   Resp: 15  21 21   Temp: 98.4 °F (36.9 °C)      TempSrc: Oral      SpO2: 97% 96% 95% 92%   Weight:       Height:           Physical Exam:  General-No Acute Distress  Neck- supple, no JVD  CV- regular rate and rhythm no MRG  Lung- clear bilaterally  Abd- soft, nontender, nondistended  Ext- no edema bilaterally. Skin- warm and dry    Data Review:   Recent Labs     03/06/23  1233 03/06/23  1656 03/06/23  2107 03/07/23  0318     --   --  139   K 3.8 4.1 4.4 4.5   MG 3.3* 2.6* 2.5* 2.5*   BUN 14  --   --  14   WBC 9.1 9.4  --  8.9   HGB 11.2* 10.7* 10.9* 10.2*   HCT 33.8* 33.5* 34.0* 32.2*   * 150  --  144*   INR 1.1  --   --   --        Assessment/Plan:     Uap  Smoker  Hyperlipidemia  S/p cabg    ///    Doing well post cabg. Cont.  current rx           Viky Forrest MD  3/7/2023 12:48 PM

## 2023-03-07 NOTE — PROGRESS NOTES
ACUTE PHYSICAL THERAPY GOALS:   (Developed with and agreed upon by patient and/or caregiver.)   Ms. Lulu Sheth will perform supine to sit and sit to supine independently with bed flat and no rails in 7 days   Ms. Lulu Sheth will perform sit to stand and bed to chair independently in 7 days. Ms. Lulu Sheth will perform gait >1000 ft independently in 7 days. Ms. Lulu Sheth will perform therex x 25 reps in sitting and standing in 7 days. PHYSICAL THERAPY Initial Assessment, Daily Note, and AM  (Link to Caseload Tracking: PT Visit Days : 1  Acknowledge Orders  Time In/Out  PT Charge Capture  Rehab Caseload Tracker    [employer] is a 59 y.o. female   PRIMARY DIAGNOSIS: Chest pain  Chest pain [R07.9]  CAD, multiple vessel [I25.10]  Procedure(s) (LRB):  CORONARY ARTERY BYPASS GRAFT (CABG X 4), LIMA; ENDOSCOPIC VEIN HARVEST, LEFT GREATER SAPHENOUS VEIN (N/A)  TRANSESOPHAGEAL ECHOCARDIOGRAM (N/A)  1 Day Post-Op  Reason for Referral: Generalized Muscle Weakness (M62.81)  Difficulty in walking, Not elsewhere classified (R26.2)  Inpatient: Payor: /     ASSESSMENT:     REHAB RECOMMENDATIONS:   Recommendation to date pending progress:  Setting:  Home Health Therapy    Equipment:    To Be Determined     ASSESSMENT:  Ms. Lulu Sheth presents with decreased mobility and decreased gait following CABG x 4. 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 So she is lifting boxes and on her feet all day. Today she is a bit anxious but tolerated activity quite well. Sit to stand from the chair with cg. Gait 100 ft with upright walker and cg. Her pain is at a level 5 . Had pain medicine earlier. She still has her basilio and chest tube in. O2 on 2 liters. Ms. Lulu Sheth is functioning below baseline and is therefore appropriate for skilled PT to maximize her rehab potential.  Expect she will do well be able able to return home with her significant other at discharge.      [facility] Basic Mobility Inpatient Short Form  AM-PAC Basic Mobility - Inpatient   How much help is needed turning from your back to your side while in a flat bed without using bedrails?: A Lot  How much help is needed moving from lying on your back to sitting on the side of a flat bed without using bedrails?: A Lot  How much help is needed moving to and from a bed to a chair?: A Little  How much help is needed standing up from a chair using your arms?: A Little  How much help is needed walking in hospital room?: A Little  How much help is needed climbing 3-5 steps with a railing?: A Little  [facility] Inpatient Mobility Raw Score : 16  AM-PAC Inpatient T-Scale Score : 40.78  Mobility Inpatient CMS 0-100% Score: 54.16  Mobility Inpatient CMS G-Code Modifier : CK    SUBJECTIVE:   Ms. Vidal Goods states, \"I was working before this\"     Social/Functional Lives With: Significant other  Type of Home: House  Home Layout: One level  Home Access: Ramped entrance  [place] [address] Help From: Family  Ambulation Assistance: Independent  Transfer Assistance: Independent  Mode of Transportation: Car  Occupation: Full time employment  Type of Occupation:  (stocking shelves)    OBJECTIVE:     PAIN: [place] / O2: PRECAUTION / Kay Pies / Jace Lacks:   Pre Treatment:    5      Post Treatment: 5 Vitals        Oxygen      Chest Tube, Crawford Catheter, and IV    RESTRICTIONS/PRECAUTIONS:                    GROSS EVALUATION: Intact Impaired (Comments):   AROM [x]     PROM []    Strength [x]     Balance [] Posture: Good  Sitting - Static: Good  Sitting - Dynamic: Good  Standing - Static: Good, -  Standing - Dynamic: Fair, +   Posture [] N/A   Sensation [x]     Coordination []      Tone []     Edema []    Activity Tolerance []      []      COGNITION/  PERCEPTION: Intact Impaired (Comments):   Orientation [x]     Vision [x]     Hearing [x]     Cognition  [x]       MOBILITY: I Mod I S SBA CGA Min Mod Max Total  NT x2 Comments:   Bed Mobility    Rolling [] [] [] [] [] [] [] [] [] [x] [] Supine to Sit [] [] [] [] [] [] [] [] [] [x] []    Scooting [] [] [] [] [] [] [] [] [] [x] []    Sit to Supine [] [] [] [] [] [] [] [] [] [x] []    Transfers    Sit to Stand [] [] [] [] [x] [] [] [] [] [] []    Bed to Chair [] [] [] [] [x] [] [] [] [] [] []    Stand to Sit [] [] [] [] [x] [] [] [] [] [] []     [] [] [] [] [] [] [] [] [] [] []    I=Independent, Mod I=Modified Independent, S=Supervision, SBA=Standby Assistance, CGA=Contact Guard Assistance,   Min=Minimal Assistance, Mod=Moderate Assistance, Max=Maximal Assistance, Total=Total Assistance, NT=Not Tested    GAIT: I Mod I S SBA CGA Min Mod Max Total  NT x2 Comments:   Level of Assistance [] [] [] [] [x] [] [] [] [] [] []    Distance 100 feet    DME Upright walker    Gait Quality Decreased gautam , Decreased step clearance, and Decreased step length    Weightbearing Status      Stairs      I=Independent, Mod I=Modified Independent, S=Supervision, SBA=Standby Assistance, CGA=Contact Guard Assistance,   Min=Minimal Assistance, Mod=Moderate Assistance, Max=Maximal Assistance, Total=Total Assistance, NT=Not Tested    PLAN:   FREQUENCY AND DURATION: BID for duration of hospital stay or until stated goals are met, whichever comes first.    THERAPY PROGNOSIS: Good    PROBLEM LIST:   (Skilled intervention is medically necessary to address:)  Decreased Activity Tolerance  Decreased AROM/PROM  Decreased Balance  Decreased Gait Ability  Decreased Safety Awareness  Decreased Strength  Decreased Transfer Abilities INTERVENTIONS PLANNED:   (Benefits and precautions of physical therapy have been discussed with the patient.)  Therapeutic Activity  Therapeutic Exercise/HEP  Neuromuscular Re-education  Gait Training  Education       TREATMENT:   EVALUATION: MODERATE COMPLEXITY: (Untimed Charge)    TREATMENT:   Therapeutic Activity (10 Minutes):  Therapeutic activity included Transfer Training, Ambulation on level ground, Sitting balance , and Standing balance to improve functional Mobility.     TREATMENT GRID:  N/A    AFTER TREATMENT PRECAUTIONS: Alarm Activated, Call light within reach, Chair, Needs within reach, and RN notified    INTERDISCIPLINARY COLLABORATION:  RN/ PCT and PT/ PTA    EDUCATION: Education Given To: Patient  Education Provided: Role of Therapy  Education Method: Verbal  Barriers to Learning: None  Education Outcome: Verbalized understanding    TIME IN/OUT:  Time In: 0830  Time Out: 9290  Minutes: [facility],

## 2023-03-07 NOTE — PROGRESS NOTES
POD 1 Day Post-Op    Procedure:  Procedure(s):  CORONARY ARTERY BYPASS GRAFT (CABG X 4), LIMA; ENDOSCOPIC VEIN HARVEST, LEFT GREATER SAPHENOUS VEIN  TRANSESOPHAGEAL ECHOCARDIOGRAM      Subjective:     Patient has No significant medical complaints      Objective:     Patient Vitals for the past 8 hrs:   BP Temp Temp src Pulse Resp SpO2 Weight   23 0732 (!) 96/52 -- -- 61 15 100 % --   23 0719 -- -- -- -- -- 97 % --   23 0700 (!) 93/53 98.2 °F (36.8 °C) Oral 60 18 98 % --   23 0645 -- -- -- 59 13 98 % --   23 0630 (!) 95/54 -- -- 61 22 95 % --   23 0615 -- -- -- 59 14 98 % --   23 0600 (!) 105/58 -- -- 60 15 98 % --   23 0545 -- -- -- 60 19 96 % --   23 0533 (!) 131/59 -- -- 59 26 98 % --   23 0530 (!) 131/59 -- -- 59 30 100 % 192 lb 7.4 oz (87.3 kg)   23 0515 -- -- -- 61 19 98 % --   23 0500 (!) 113/58 98.9 °F (37.2 °C) Bladder 62 20 99 % --   23 0445 -- -- -- 62 (!) 36 98 % --   23 0430 -- -- -- 60 27 97 % --   23 0415 -- -- -- 59 23 98 % --   23 0400 (!) 100/53 99.1 °F (37.3 °C) Bladder 61 23 93 % --   23 0345 -- -- -- 61 (!) 32 97 % --   23 0330 -- -- -- 61 21 96 % --   23 0315 -- -- -- 59 29 96 % --   23 0300 (!) 99/55 99.1 °F (37.3 °C) Bladder 59 23 97 % --   23 0245 -- -- -- 59 25 97 % --   23 0230 -- -- -- 59 23 96 % --   23 0215 -- -- -- 59 28 97 % --   23 0200 (!) 96/53 99 °F (37.2 °C) Bladder 58 30 98 % --   23 0145 -- -- -- 58 23 97 % --   23 0130 -- -- -- 58 25 98 % --   23 0115 -- -- -- 60 (!) 33 99 % --   23 0100 -- 98.8 °F (37.1 °C) Bladder 59 22 98 % --   23 0045 -- -- -- 59 23 98 % --     Temp (24hrs), Av.9 °F (36.6 °C), Min:97 °F (36.1 °C), Max:99.1 °F (37.3 °C)        Hemodynamics    PAP    CO    CI    No intake/output data recorded.    1901 -  0700  In: 4009 [P.O.:180; I.V.:3429]  Out: 7012 [Urine:2188]    CT Drainage              total of all CT's    Heart:  regular rate and rhythm, S1, S2 normal, no murmur, click, rub or gallop  Lung:  clear to auscultation bilaterally  Neuro: Grossly non focal  Incisions: Clean, dry, and intact    Labs:  Recent Results (from the past 24 hour(s))   POCT Blood Gas & Electrolytes    Collection Time: 03/06/23  9:23 AM   Result Value Ref Range    pH, Arterial, POC 7.37 7.35 - 7.45      pCO2, Arterial, POC 48.3 (H) 35 - 45 MMHG    pO2, Arterial,  (H) 75 - 100 MMHG    POC Sodium 143 136 - 145 MMOL/L    POC Potassium 4.0 3.5 - 5.1 MMOL/L    POC Ionized Calcium 1.18 1.12 - 1.32 mmol/L    POC Glucose 122 (H) 65 - 100 MG/DL    Base Excess 1.5 mmol/L    HCO3, Mixed 27.6 (H) 22 - 26 MMOL/L    POC TCO2 27 (H) 13 - 23 MMOL/L    POC O2  %    Source ARTERIAL      Performed by: RichardPerfusionist     eGFR, POC Cannot be calculated >60 ml/min/1.73m2   POC Heparin Protamine Titration    Collection Time: 03/06/23  9:25 AM   Result Value Ref Range    ACT AVERAGE - AAVG 412 sec    Heparin Required-Patient 99      Heparin Required-Total 120 units    Protamine Dose-Pump 41 mg    Heparin Test Concentrate 3.0 mg/kg    Protamine Dose-Total 232 mg   POC Coagulation Time, Activated    Collection Time: 03/06/23  9:38 AM   Result Value Ref Range    ACT AVERAGE - AAVG 460 sec   POCT Blood Gas & Electrolytes    Collection Time: 03/06/23  9:56 AM   Result Value Ref Range    pH, Arterial, POC 7.43 7.35 - 7.45      pCO2, Arterial, POC 38.6 35 - 45 MMHG    pO2, Arterial,  (H) 75 - 100 MMHG    POC Sodium 138 136 - 145 MMOL/L    POC Potassium 5.2 (H) 3.5 - 5.1 MMOL/L    POC Ionized Calcium 1.05 (L) 1.12 - 1.32 mmol/L    POC Glucose 127 (H) 65 - 100 MG/DL    Base Excess 0.9 mmol/L    HCO3, Mixed 25.3 22 - 26 MMOL/L    POC TCO2 24 (H) 13 - 23 MMOL/L    POC O2  %    Source ARTERIAL      Performed by: RichardetPerfusionist     eGFR, POC Cannot be calculated >60 ml/min/1.73m2   POC Heparin Protamine Titration    Collection Time: 03/06/23 10:00 AM   Result Value Ref Range    ACT AVERAGE - AAVG 563 sec    Heparin Required-Patient 0      Heparin Required-Total 0 units    Protamine Dose-Pump 48 mg    Heparin Test Concentrate 3.5 mg/kg    Protamine Dose-Total 270 mg   POCT Blood Gas & Electrolytes    Collection Time: 03/06/23 10:11 AM   Result Value Ref Range    pH, Arterial, POC 7.45 7.35 - 7.45      pCO2, Arterial, POC 36.9 35 - 45 MMHG    pO2, Arterial,  (H) 75 - 100 MMHG    POC Sodium 140 136 - 145 MMOL/L    POC Potassium 4.5 3.5 - 5.1 MMOL/L    POC Ionized Calcium 1.06 (L) 1.12 - 1.32 mmol/L    POC Glucose 121 (H) 65 - 100 MG/DL    Base Excess 1.4 mmol/L    HCO3, Mixed 25.5 22 - 26 MMOL/L    POC TCO2 25 (H) 13 - 23 MMOL/L    POC O2  %    Source ARTERIAL      Performed by: NayanPerfusionist     eGFR, POC Cannot be calculated >60 ml/min/1.73m2   POCT Blood Gas & Electrolytes    Collection Time: 03/06/23 10:26 AM   Result Value Ref Range    pH, Arterial, POC 7.44 7.35 - 7.45      pCO2, Arterial, POC 38.3 35 - 45 MMHG    pO2, Arterial,  (H) 75 - 100 MMHG    POC Sodium 140 136 - 145 MMOL/L    POC Potassium 4.5 3.5 - 5.1 MMOL/L    POC Ionized Calcium 1.05 (L) 1.12 - 1.32 mmol/L    POC Glucose 115 (H) 65 - 100 MG/DL    Base Excess 1.6 mmol/L    HCO3, Mixed 25.8 22 - 26 MMOL/L    POC TCO2 25 (H) 13 - 23 MMOL/L    POC O2  %    Source ARTERIAL      Performed by: RichardetPerfusionist     eGFR, POC Cannot be calculated >60 ml/min/1.73m2   POC Heparin Protamine Titration    Collection Time: 03/06/23 10:30 AM   Result Value Ref Range    ACT AVERAGE - AAVG 538 sec    Heparin Required-Patient 0      Heparin Required-Total 0 units    Protamine Dose-Pump 48 mg    Heparin Test Concentrate 3.5 mg/kg    Protamine Dose-Total 270 mg   POCT Blood Gas & Electrolytes    Collection Time: 03/06/23 10:44 AM   Result Value Ref Range    pH, Arterial, POC 7.43 7.35 - 7.45      pCO2, Arterial, POC 39.0 35 - 45 MMHG    pO2, Arterial,  (H) 75 - 100 MMHG    POC Sodium 142 136 - 145 MMOL/L    POC Potassium 4.8 3.5 - 5.1 MMOL/L    POC Ionized Calcium 1.07 (L) 1.12 - 1.32 mmol/L    POC Glucose 116 (H) 65 - 100 MG/DL    Base Excess 1.5 mmol/L    HCO3, Mixed 25.9 22 - 26 MMOL/L    POC TCO2 25 (H) 13 - 23 MMOL/L    POC O2  %    Source ARTERIAL      Performed by: ZaingaretPerfusionist     eGFR, POC Cannot be calculated >60 ml/min/1.73m2   POC Heparin Protamine Titration    Collection Time: 03/06/23 11:01 AM   Result Value Ref Range    ACT AVERAGE - AAVG 534 sec    Heparin Required-Patient 3,276      Heparin Required-Total 3,977 units    Protamine Dose-Pump 34 mg    Heparin Test Concentrate 2.5 mg/kg    Protamine Dose-Total 193 mg   POCT Blood Gas & Electrolytes    Collection Time: 03/06/23 11:25 AM   Result Value Ref Range    pH, Arterial, POC 7.40 7.35 - 7.45      pCO2, Arterial, POC 37.1 35 - 45 MMHG    pO2, Arterial,  (H) 75 - 100 MMHG    POC Sodium 143 136 - 145 MMOL/L    POC Potassium 4.0 3.5 - 5.1 MMOL/L    POC Ionized Calcium 1.30 1.12 - 1.32 mmol/L    POC Glucose 118 (H) 65 - 100 MG/DL    BASE DEFICIT (POC) 1.5 mmol/L    HCO3, Mixed 23.1 22 - 26 MMOL/L    POC TCO2 22 13 - 23 MMOL/L    POC O2 SAT 99 %    Source ARTERIAL      Performed by: ZaingaretPerfusionist     eGFR, POC Cannot be calculated >60 ml/min/1.73m2   POC Coagulation Time, Activated    Collection Time: 03/06/23 11:26 AM   Result Value Ref Range    ACT AVERAGE - AAVG 124 sec   POCT Blood Gas & Electrolytes    Collection Time: 03/06/23 12:25 PM   Result Value Ref Range    pH, Arterial, POC 7.42 7.35 - 7.45      pCO2, Arterial, POC 36.8 35 - 45 MMHG    pO2, Arterial,  (H) 75 - 100 MMHG    POC Sodium 141 136 - 145 MMOL/L    POC Potassium 3.7 3.5 - 5.1 MMOL/L    POC Ionized Calcium 1.22 1.12 - 1.32 mmol/L    POC Glucose 106 (H) 65 - 100 MG/DL BASE DEFICIT (POC) 0.4 mmol/L    HCO3, Mixed 23.9 22 - 26 MMOL/L    POC TCO2 23 13 - 23 MMOL/L    POC O2  %    Source ARTERIAL      Site DRAWN FROM ARTERIAL LINE      Dave Test NOT APPLICABLE      Mode Pressure regulated volume control      FIO2 Arterial 100 %    POC TIDAL VOLUME 450      POC PEEP/CPA 8      Respiratory Rate 18      Performed by: Nakul     eGFR, POC Cannot be calculated >60 ml/min/1.73m2   POCT Glucose    Collection Time: 03/06/23 12:27 PM   Result Value Ref Range    POC Glucose 125 (H) 65 - 100 mg/dL    Performed by: Bridger    CBC    Collection Time: 03/06/23 12:33 PM   Result Value Ref Range    WBC 9.1 4.3 - 11.1 K/uL    RBC 3.59 (L) 4.05 - 5.2 M/uL    Hemoglobin 11.2 (L) 11.7 - 15.4 g/dL    Hematocrit 33.8 (L) 35.8 - 46.3 %    MCV 94.2 82 - 102 FL    MCH 31.2 26.1 - 32.9 PG    MCHC 33.1 31.4 - 35.0 g/dL    RDW 14.6 11.9 - 14.6 %    Platelets 377 (L) 008 - 450 K/uL    MPV 11.0 9.4 - 12.3 FL    nRBC 0.00 0.0 - 0.2 K/uL   Protime-INR    Collection Time: 03/06/23 12:33 PM   Result Value Ref Range    Protime 14.4 (H) 12.6 - 14.3 sec    INR 1.1     APTT    Collection Time: 03/06/23 12:33 PM   Result Value Ref Range    PTT 30.1 24.5 - 34.2 SEC   Magnesium    Collection Time: 03/06/23 12:33 PM   Result Value Ref Range    Magnesium 3.3 (H) 1.8 - 2.4 mg/dL   Fibrinogen    Collection Time: 03/06/23 12:33 PM   Result Value Ref Range    Fibrinogen 358 190 - 501 mg/dL   Basic Metabolic Panel    Collection Time: 03/06/23 12:33 PM   Result Value Ref Range    Sodium 142 133 - 143 mmol/L    Potassium 3.8 3.5 - 5.1 mmol/L    Chloride 115 (H) 101 - 110 mmol/L    CO2 25 21 - 32 mmol/L    Anion Gap 2 2 - 11 mmol/L    Glucose 113 (H) 65 - 100 mg/dL    BUN 14 8 - 23 MG/DL    Creatinine 0.80 0.6 - 1.0 MG/DL    Est, Glom Filt Rate >60 >60 ml/min/1.73m2    Calcium 8.5 8.3 - 10.4 MG/DL   EKG 12 lead    Collection Time: 03/06/23 12:36 PM   Result Value Ref Range    Ventricular Rate 58 BPM Atrial Rate 58 BPM    P-R Interval 164 ms    QRS Duration 94 ms    Q-T Interval 474 ms    QTc Calculation (Bazett) 465 ms    P Axis 63 degrees    R Axis 72 degrees    T Axis 53 degrees    Diagnosis Sinus bradycardia    POCT Glucose    Collection Time: 03/06/23  1:44 PM   Result Value Ref Range    POC Glucose 119 (H) 65 - 100 mg/dL    Performed by: Bridger    POCT Glucose    Collection Time: 03/06/23  2:53 PM   Result Value Ref Range    POC Glucose 125 (H) 65 - 100 mg/dL    Performed by: Bridger    POCT Glucose    Collection Time: 03/06/23  3:33 PM   Result Value Ref Range    POC Glucose 129 (H) 65 - 100 mg/dL    Performed by: Bridger    POCT Glucose    Collection Time: 03/06/23  4:49 PM   Result Value Ref Range    POC Glucose 119 (H) 65 - 100 mg/dL    Performed by: Bridger    CBC    Collection Time: 03/06/23  4:56 PM   Result Value Ref Range    WBC 9.4 4.3 - 11.1 K/uL    RBC 3.51 (L) 4.05 - 5.2 M/uL    Hemoglobin 10.7 (L) 11.7 - 15.4 g/dL    Hematocrit 33.5 (L) 35.8 - 46.3 %    MCV 95.4 82 - 102 FL    MCH 30.5 26.1 - 32.9 PG    MCHC 31.9 31.4 - 35.0 g/dL    RDW 14.6 11.9 - 14.6 %    Platelets 354 998 - 124 K/uL    MPV 11.0 9.4 - 12.3 FL    nRBC 0.00 0.0 - 0.2 K/uL   Magnesium    Collection Time: 03/06/23  4:56 PM   Result Value Ref Range    Magnesium 2.6 (H) 1.8 - 2.4 mg/dL   Potassium    Collection Time: 03/06/23  4:56 PM   Result Value Ref Range    Potassium 4.1 3.5 - 5.1 mmol/L   POCT Glucose    Collection Time: 03/06/23  6:09 PM   Result Value Ref Range    POC Glucose 119 (H) 65 - 100 mg/dL    Performed by: Bridger    POCT Glucose    Collection Time: 03/06/23  7:33 PM   Result Value Ref Range    POC Glucose 112 (H) 65 - 100 mg/dL    Performed by: Juan Carlos    POCT Glucose    Collection Time: 03/06/23  9:04 PM   Result Value Ref Range    POC Glucose 118 (H) 65 - 100 mg/dL    Performed by: Juan Carlos    Hemoglobin and Hematocrit Collection Time: 03/06/23  9:07 PM   Result Value Ref Range    Hemoglobin 10.9 (L) 11.7 - 15.4 g/dL    Hematocrit 34.0 (L) 35.8 - 46.3 %   Magnesium    Collection Time: 03/06/23  9:07 PM   Result Value Ref Range    Magnesium 2.5 (H) 1.8 - 2.4 mg/dL   Potassium    Collection Time: 03/06/23  9:07 PM   Result Value Ref Range    Potassium 4.4 3.5 - 5.1 mmol/L   POCT Glucose    Collection Time: 03/06/23 10:14 PM   Result Value Ref Range    POC Glucose 119 (H) 65 - 100 mg/dL    Performed by: Juan Carlos    POCT Glucose    Collection Time: 03/06/23 11:12 PM   Result Value Ref Range    POC Glucose 117 (H) 65 - 100 mg/dL    Performed by: Juan Carlos    POCT Glucose    Collection Time: 03/07/23 12:06 AM   Result Value Ref Range    POC Glucose 117 (H) 65 - 100 mg/dL    Performed by: Juan Carlos    POCT Glucose    Collection Time: 03/07/23  1:16 AM   Result Value Ref Range    POC Glucose 112 (H) 65 - 100 mg/dL    Performed by: Juan Carlos    POCT Glucose    Collection Time: 03/07/23  2:13 AM   Result Value Ref Range    POC Glucose 114 (H) 65 - 100 mg/dL    Performed by: Juan Carlos    POCT Glucose    Collection Time: 03/07/23  3:10 AM   Result Value Ref Range    POC Glucose 119 (H) 65 - 100 mg/dL    Performed by: Juan Carlos    Basic Metabolic Panel    Collection Time: 03/07/23  3:18 AM   Result Value Ref Range    Sodium 139 133 - 143 mmol/L    Potassium 4.5 3.5 - 5.1 mmol/L    Chloride 113 (H) 101 - 110 mmol/L    CO2 24 21 - 32 mmol/L    Anion Gap 2 2 - 11 mmol/L    Glucose 117 (H) 65 - 100 mg/dL    BUN 14 8 - 23 MG/DL    Creatinine 0.80 0.6 - 1.0 MG/DL    Est, Glom Filt Rate >60 >60 ml/min/1.73m2    Calcium 8.0 (L) 8.3 - 10.4 MG/DL   CBC    Collection Time: 03/07/23  3:18 AM   Result Value Ref Range    WBC 8.9 4.3 - 11.1 K/uL    RBC 3.36 (L) 4.05 - 5.2 M/uL    Hemoglobin 10.2 (L) 11.7 - 15.4 g/dL    Hematocrit 32.2 (L) 35.8 - 46.3 %    MCV 95.8 82 - 102 FL    MCH 30.4 26.1 - 32.9 PG    MCHC 31.7 31.4 - 35.0 g/dL    RDW 14.8 (H) 11.9 - 14.6 %    Platelets 413 (L) 072 - 450 K/uL    MPV 11.5 9.4 - 12.3 FL    nRBC 0.00 0.0 - 0.2 K/uL   Magnesium    Collection Time: 03/07/23  3:18 AM   Result Value Ref Range    Magnesium 2.5 (H) 1.8 - 2.4 mg/dL   POCT Glucose    Collection Time: 03/07/23  4:03 AM   Result Value Ref Range    POC Glucose 109 (H) 65 - 100 mg/dL    Performed by: Juan Carlos    POCT Glucose    Collection Time: 03/07/23  5:14 AM   Result Value Ref Range    POC Glucose 102 (H) 65 - 100 mg/dL    Performed by: Juan Carlos    POCT Glucose    Collection Time: 03/07/23  6:29 AM   Result Value Ref Range    POC Glucose 107 (H) 65 - 100 mg/dL    Performed by: Juan Carlos    POCT Glucose    Collection Time: 03/07/23  7:39 AM   Result Value Ref Range    POC Glucose 105 (H) 65 - 100 mg/dL    Performed by: Bridger    EKG 12 lead    Collection Time: 03/07/23  7:56 AM   Result Value Ref Range    Ventricular Rate 64 BPM    Atrial Rate 64 BPM    P-R Interval 148 ms    QRS Duration 82 ms    Q-T Interval 506 ms    QTc Calculation (Bazett) 522 ms    P Axis 39 degrees    R Axis 22 degrees    T Axis 13 degrees    Diagnosis Normal sinus rhythm        Assessment:     Principal Problem:    Chest pain  Active Problems:    Hypertension    Elevated LFTs    CAD, multiple vessel    Cigarette smoker    Familial hypercholesteremia    Hypothyroid    Atherosclerotic heart disease of native coronary artery with unstable angina pectoris ([address]  Resolved Problems:    * No resolved hospital problems.  *      Plan/Recommendations/Medical Decision Making:     Discontinue:  chest tubes  To floor, protocol, will need pulm toilet  See orders

## 2023-03-07 NOTE — PROGRESS NOTES
A follow up visit was made to the patient. Emotional support, spiritual presence and   prayer were provided for the patient. She was awake and sitting in her recliner. She said that she was doing better.       Emily Santana, [address] [facility], [facility]

## 2023-03-07 NOTE — PROGRESS NOTES
ACUTE PHYSICAL THERAPY GOALS:   (Developed with and agreed upon by patient and/or caregiver.)   Ms. Eugenio Womack will perform supine to sit and sit to supine independently with bed flat and no rails in 7 days   Ms. Eugenio Womack will perform sit to stand and bed to chair independently in 7 days. Ms. Eugenio Womack will perform gait >1000 ft independently in 7 days. Ms. Eugenio Womack will perform therex x 25 reps in sitting and standing in 7 days. PHYSICAL THERAPY Daily Note and PM  (Link to Caseload Tracking: PT Visit Days : 1  Acknowledge Orders  Time In/Out  PT Charge Capture  Rehab Caseload Tracker    Rhiannon Gutierres is a 59 y.o. female   PRIMARY DIAGNOSIS: Chest pain  Chest pain [R07.9]  CAD, multiple vessel [I25.10]  Procedure(s) (LRB):  CORONARY ARTERY BYPASS GRAFT (CABG X 4), LIMA; ENDOSCOPIC VEIN HARVEST, LEFT GREATER SAPHENOUS VEIN (N/A)  TRANSESOPHAGEAL ECHOCARDIOGRAM (N/A)  1 Day Post-Op  Reason for Referral: Generalized Muscle Weakness (M62.81)  Difficulty in walking, Not elsewhere classified (R26.2)  Inpatient: Payor: /     ASSESSMENT:     REHAB RECOMMENDATIONS:   Recommendation to date pending progress:  Setting:  Home Health Therapy    Equipment:    To Be Determined     ASSESSMENT:  Ms. Eugenio Womack presents with decreased mobility and decreased gait following CABG x 4. 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 So she is lifting boxes and on her feet all day. Today she is a bit anxious but tolerated activity quite well. Sit to stand from the chair with cg. Gait 100 ft with upright walker and cg. Her pain is at a level 5 . Had pain medicine earlier. She still has her basilio and chest tube in. O2 on 2 liters. Ms. Eugenio Womack is functioning below baseline and is therefore appropriate for skilled PT to maximize her rehab potential.  Expect she will do well be able able to return home with her significant other at discharge. PM-Ms. Eugenio Wmoack is doing well.   Chest tube was taken out this morning and just has the basilio left due to getting lasix. She was able to double her gait distance this afternoon. Expect she will continue to progress. Will be able to try off the walker tomorrow.        [employer] [facility] 6 Clicks Basic Mobility Inpatient Short Form  AM-PAC Basic Mobility - Inpatient   How much help is needed turning from your back to your side while in a flat bed without using bedrails?: A Lot  How much help is needed moving from lying on your back to sitting on the side of a flat bed without using bedrails?: A Lot  How much help is needed moving to and from a bed to a chair?: A Little  How much help is needed standing up from a chair using your arms?: A Little  How much help is needed walking in hospital room?: A Little  How much help is needed climbing 3-5 steps with a railing?: A Little  [facility] Inpatient Mobility Raw Score : 16  AM-PAC Inpatient T-Scale Score : 40.78  Mobility Inpatient CMS 0-100% Score: 54.16  Mobility Inpatient CMS G-Code Modifier : CK    SUBJECTIVE:   Ms. Juan C Rendon states, \"I want to get better\"    Social/Functional Lives With: Significant other  Type of Home: House  Home Layout: One level  Home Access: Ramped entrance  Receives Help From: Family  Ambulation Assistance: Independent  Transfer Assistance: Independent  Mode of Transportation: Car  Occupation: Full time employment  Type of Occupation:  (stocking shelves)    OBJECTIVE:     PAIN: Martha Serene / O2: PRECAUTION / Waunita Daniel / DRAINS:   Pre Treatment:    6      Post Treatment: 6 Vitals        Oxygen      Chest Tube, Basilio Catheter, and IV    RESTRICTIONS/PRECAUTIONS:                    GROSS EVALUATION: Intact Impaired (Comments):   AROM [x]     PROM []    Strength [x]     Balance [] Posture: Good  Sitting - Static: Good  Sitting - Dynamic: Good  Standing - Static: Good, -  Standing - Dynamic: Fair, +   Posture [] N/A   Sensation [x]     Coordination []      Tone []     Edema []    Activity Tolerance []      []      COGNITION/  PERCEPTION: Intact Impaired (Comments):   Orientation [x]     Vision [x]     Hearing [x]     Cognition  [x]       MOBILITY: I Mod I S SBA CGA Min Mod Max Total  NT x2 Comments:   Bed Mobility    Rolling [] [] [] [] [] [] [] [] [] [x] []    Supine to Sit [] [] [] [] [] [] [] [] [] [x] []    Scooting [] [] [] [] [] [] [] [] [] [x] []    Sit to Supine [] [] [] [] [] [] [] [] [] [x] []    Transfers    Sit to Stand [] [] [] [] [x] [] [] [] [] [] []    Bed to Chair [] [] [] [] [x] [] [] [] [] [] []    Stand to Sit [] [] [] [] [x] [] [] [] [] [] []     [] [] [] [] [] [] [] [] [] [] []    I=Independent, Mod I=Modified Independent, S=Supervision, SBA=Standby Assistance, CGA=Contact Guard Assistance,   Min=Minimal Assistance, Mod=Moderate Assistance, Max=Maximal Assistance, Total=Total Assistance, NT=Not Tested    GAIT: I Mod I S SBA CGA Min Mod Max Total  NT x2 Comments:   Level of Assistance [] [] [] [] [x] [] [] [] [] [] []    Distance 200 feet    DME Upright walker    Gait Quality Decreased gautam , Decreased step clearance, and Decreased step length    Weightbearing Status      Stairs      I=Independent, Mod I=Modified Independent, S=Supervision, SBA=Standby Assistance, CGA=Contact Guard Assistance,   Min=Minimal Assistance, Mod=Moderate Assistance, Max=Maximal Assistance, Total=Total Assistance, NT=Not Tested    PLAN:   FREQUENCY AND DURATION: BID for duration of hospital stay or until stated goals are met, whichever comes first.    THERAPY PROGNOSIS: Good    PROBLEM LIST:   (Skilled intervention is medically necessary to address:)  Decreased Activity Tolerance  Decreased AROM/PROM  Decreased Balance  Decreased Gait Ability  Decreased Safety Awareness  Decreased Strength  Decreased Transfer Abilities INTERVENTIONS PLANNED:   (Benefits and precautions of physical therapy have been discussed with the patient.)  Therapeutic Activity  Therapeutic Exercise/HEP  Neuromuscular Re-education  Gait Training  Education       TREATMENT: EVALUATION: MODERATE COMPLEXITY: (Untimed Charge)    TREATMENT:   Therapeutic Activity (23 Minutes): Therapeutic activity included Transfer Training, Ambulation on level ground, Sitting balance , and Standing balance to improve functional Mobility.     TREATMENT GRID:     Date:  3/7 Date:   Date:     ACTIVITY/EXERCISE AM PM AM PM AM PM   AP  15       LAQ  15       Marching  15                                           B = bilateral; AA = active assistive; A = active; P = passive    AFTER TREATMENT PRECAUTIONS: Alarm Activated, Call light within reach, Chair, Needs within reach, and RN notified    INTERDISCIPLINARY COLLABORATION:  RN/ PCT and PT/ PTA    EDUCATION:      TIME IN/OUT:  Time In: 1510  Time Out: [address]  Minutes: [address] [facility], PT

## 2023-03-07 NOTE — PROGRESS NOTES
Cardiovascular ICU Consult Note: 3/7/2023  iNcole Lopez                                                     Admission Date: 3/3/2023     The patient's chart is reviewed and the patient is discussed with the staff. Patient is seen at the request of Denita Jennings MD  for respiratory management status post cardiac surgery - CABG x 4  Patient had Chest pain. Currently is sedated in CV-ICU and orally intubated receiving  mechanical ventilation. We have been asked to see in the CV-ICU for mechanical ventilation management and weaning. Patient is a 59 y.o.  female seen and evaluated at the request of Dr. Adam Berrios prior to CABG. Patient has a PMH of HLD (has been intolerant to treatment with myalgias in past), fatty liver, and hypothyroid. She is a current smoker, smokes up to 1 ppd for 45 years. She says now she usually only smokes 4-5 total cigarettes. She presented to ED on 3/2 with chest pain/shoulder pain that started that day while she was at work. Cardiology admitted patient with plans for LHC. Troponins were negative. EKG showed SR with diffuse inverted T waves. LHC completed on 3/3 and showed multivessel disease with plans for CABG. She denies any history of COPD/emphysema. Does not use O2 at home. No inhaler or nebulizer use. States she doesn't like to take medications. Says she has some shortness of breath daily with congested cough occasionally. Seen immediately after her LHC and she is quite anxious and irritable. Wants to go smoke. Subjective:   Extubated yesterday, planned for step down today, off all pressors, up in chair.     Current Outpatient Medications   Medication Instructions    levothyroxine (SYNTHROID) 100 mcg, Oral, DAILY      Review of Systems: unable to determine due to intubated status  Past Medical History:   Diagnosis Date    Hyperlipidemia     Hypothyroid      Past Surgical History:   Procedure Laterality Date    CARDIAC PROCEDURE N/A 3/3/2023 LEFT HEART CATH / CORONARY ANGIOGRAPHY performed by Radha Rodrigues MD at [facility] CARDIAC CATH LAB    CHOLECYSTECTOMY      GYN      hysterectomy     Social History     Socioeconomic History    Marital status:      Spouse name: Not on file    Number of children: Not on file    Years of education: Not on file    Highest education level: Not on file   Occupational History    Not on file   Tobacco Use    Smoking status: Every Day     Packs/day: 1.00     Types: Cigarettes    Smokeless tobacco: Never   Substance and Sexual Activity    Alcohol use: No    Drug use: No    Sexual activity: Not on file   Other Topics Concern    Not on file   Social History Narrative    Not on file     Social Determinants of Health     Financial Resource Strain: Not on file   Food Insecurity: Not on file   Transportation Needs: Not on file   Physical Activity: Not on file   Stress: Not on file   Social Connections: Not on file   Intimate Partner Violence: Not on file   Housing Stability: Not on file     Family History   Problem Relation Age of Onset    No Known Problems Sister     No Known Problems Brother      Allergies   Allergen Reactions    Pcn [Penicillins] Nausea And Vomiting     Objective:   Blood pressure (!) 96/52, pulse 61, temperature 98.2 °F (36.8 °C), temperature source Oral, resp. rate 15, height 5' 7.99\" (1.727 m), weight 192 lb 7.4 oz (87.3 kg), SpO2 100 %. Intake/Output Summary (Last 24 hours) at 3/7/2023 0815  Last data filed at 3/7/2023 3350  Gross per 24 hour   Intake 4008.96 ml   Output 2355 ml   Net 1653.96 ml       Physical Exam:          Constitutional: Up in chair, awake and alert  EENMT:  Sclera clear, pupils equal, oral mucosa moist   Respiratory: mild corse sounds. No wheezing  Cardiovascular:  RRR with no M,G,R;  Gastrointestinal:  soft; + bowel sounds present  Musculoskeletal:  warm with no cyanosis, no lower extremity edema. [place] site left leg with ace wrap. Sedated with no movements.   SKIN:  no jaundice or ecchymosis   Neurologic:  sedated but no gross neuro deficits  Psychiatric:  sedated and unable to assess at this time    CXR:      Atelectasis and low lung volumes OTW clear    ETT noted and normal post op changes            Recent Labs     03/06/23  1011 03/06/23  1026 03/06/23  1044 03/06/23  1125 03/06/23  1225   PHAPOC 7.45 7.44 7.43 7.40 7.42   YQD6AHBP 36.9 38.3 39.0 37.1 36.8   OQ9HLUG 304* 285* 260* 155* 193*   UIW3LDW 25.5 25.8 25.9 23.1 23.9   BE 1.4 1.6 1.5  --   --        Recent Labs     03/05/23  0359 03/06/23 1233 03/06/23 1656 03/06/23 2107 03/07/23  0318   WBC 6.4 9.1 9.4  --  8.9   HGB 12.9 11.2* 10.7* 10.9* 10.2*   HCT 40.4 33.8* 33.5* 34.0* 32.2*    145* 150  --  144*   INR  --  1.1  --   --   --        Recent Labs     03/06/23  1233 03/06/23  1656 03/06/23  2107 03/07/23  0318     --   --  139   K 3.8 4.1 4.4 4.5   *  --   --  113*   CO2 25  --   --  24   BUN 14  --   --  14   CREATININE 0.80  --   --  0.80   MG 3.3* 2.6* 2.5* 2.5*       No results for input(s): PROCAL, LACTATE in the last 72 hours. 03/03/23    TRANSTHORACIC ECHOCARDIOGRAM (TTE) COMPLETE (CONTRAST/BUBBLE/3D PRN) 03/03/2023 12:03 PM, 03/03/2023 12:00 AM (Final)    Interpretation Summary    Left Ventricle: Normal left ventricular systolic function with a visually estimated EF of 60 - 65%. Left ventricle size is normal. Normal wall thickness. Normal wall motion. Normal diastolic function. Tricuspid Valve: The estimated RVSP is 21 mmHg. Technical qualifiers: Color flow Doppler was performed and pulse wave and/or continuous wave Doppler was performed.     Signed by: Taft Severs, MD on 3/3/2023 12:03 PM, Signed by: Unknown Provider Result on 3/3/2023 12:00 AM    Assessment and Plan :  (Medical Decision Making)   Impression: 59 y.o. y/o female s/p CV surgery for Chest pain, extubated 03/06/23    Encounter for weaning from Ventilator:  Successfully weaned- mobilize per protocol    Hypoxemia: Once weaned from ventilator will work on IS, mobility and weaning O2. Bronchodilators per protocol. Atelectasis:   IS, pain control and mobilization  S/P Cardiovascular surgery:  S/p CABG x 4  Monitor chest tube output, removal per surgery. Tobacco Abuse  --will need evaluation for COPD in the future. --continue BD for now. More than 50% of the time documented was spent in face-to-face contact with the patient and in the care of the patient on the floor/unit where the patient is located.            Margie Davidson MD

## 2023-03-08 ENCOUNTER — APPOINTMENT (OUTPATIENT)
Dept: GENERAL RADIOLOGY | Age: 63
DRG: 234 | End: 2023-03-08
Attending: INTERNAL MEDICINE
Payer: COMMERCIAL

## 2023-03-08 PROBLEM — J90 PLEURAL EFFUSION ON LEFT: Status: ACTIVE | Noted: 2023-03-08

## 2023-03-08 PROBLEM — Z95.1 S/P CABG X 4: Status: ACTIVE | Noted: 2023-03-06

## 2023-03-08 PROBLEM — Z99.11 ENCOUNTER FOR WEANING FROM VENTILATOR (HCC): Status: ACTIVE | Noted: 2023-03-08

## 2023-03-08 PROBLEM — R45.1 AGITATION: Status: ACTIVE | Noted: 2023-03-08

## 2023-03-08 PROBLEM — Z72.0 TOBACCO ABUSE: Status: ACTIVE | Noted: 2023-03-08

## 2023-03-08 LAB
ANION GAP SERPL CALC-SCNC: 2 MMOL/L (ref 2–11)
BUN SERPL-MCNC: 13 MG/DL (ref 8–23)
CALCIUM SERPL-MCNC: 7.9 MG/DL (ref 8.3–10.4)
CHLORIDE SERPL-SCNC: 104 MMOL/L (ref 101–110)
CO2 SERPL-SCNC: 29 MMOL/L (ref 21–32)
CREAT SERPL-MCNC: 0.9 MG/DL (ref 0.6–1)
EKG ATRIAL RATE: 66 BPM
EKG DIAGNOSIS: NORMAL
EKG P AXIS: 52 DEGREES
EKG P-R INTERVAL: 154 MS
EKG Q-T INTERVAL: 444 MS
EKG QRS DURATION: 92 MS
EKG QTC CALCULATION (BAZETT): 465 MS
EKG R AXIS: 45 DEGREES
EKG T AXIS: 2 DEGREES
EKG VENTRICULAR RATE: 66 BPM
ERYTHROCYTE [DISTWIDTH] IN BLOOD BY AUTOMATED COUNT: 15 % (ref 11.9–14.6)
GLUCOSE BLD STRIP.AUTO-MCNC: 115 MG/DL (ref 65–100)
GLUCOSE BLD STRIP.AUTO-MCNC: 98 MG/DL (ref 65–100)
GLUCOSE SERPL-MCNC: 117 MG/DL (ref 65–100)
HCT VFR BLD AUTO: 32.5 % (ref 35.8–46.3)
HGB BLD-MCNC: 10 G/DL (ref 11.7–15.4)
MAGNESIUM SERPL-MCNC: 2.5 MG/DL (ref 1.8–2.4)
MCH RBC QN AUTO: 30.2 PG (ref 26.1–32.9)
MCHC RBC AUTO-ENTMCNC: 30.8 G/DL (ref 31.4–35)
MCV RBC AUTO: 98.2 FL (ref 82–102)
MM INDURATION, POC: 0 MM (ref 0–5)
NRBC # BLD: 0 K/UL (ref 0–0.2)
PLATELET # BLD AUTO: 141 K/UL (ref 150–450)
PMV BLD AUTO: 11.8 FL (ref 9.4–12.3)
POTASSIUM SERPL-SCNC: 4.5 MMOL/L (ref 3.5–5.1)
PPD, POC: NEGATIVE
RBC # BLD AUTO: 3.31 M/UL (ref 4.05–5.2)
SERVICE CMNT-IMP: ABNORMAL
SERVICE CMNT-IMP: NORMAL
SODIUM SERPL-SCNC: 135 MMOL/L (ref 133–143)
WBC # BLD AUTO: 8.3 K/UL (ref 4.3–11.1)

## 2023-03-08 PROCEDURE — 6370000000 HC RX 637 (ALT 250 FOR IP): Performed by: THORACIC SURGERY (CARDIOTHORACIC VASCULAR SURGERY)

## 2023-03-08 PROCEDURE — 93005 ELECTROCARDIOGRAM TRACING: CPT | Performed by: PHYSICIAN ASSISTANT

## 2023-03-08 PROCEDURE — 99024 POSTOP FOLLOW-UP VISIT: CPT | Performed by: PHYSICIAN ASSISTANT

## 2023-03-08 PROCEDURE — 71045 X-RAY EXAM CHEST 1 VIEW: CPT

## 2023-03-08 PROCEDURE — 2580000003 HC RX 258: Performed by: THORACIC SURGERY (CARDIOTHORACIC VASCULAR SURGERY)

## 2023-03-08 PROCEDURE — 94640 AIRWAY INHALATION TREATMENT: CPT

## 2023-03-08 PROCEDURE — 36415 COLL VENOUS BLD VENIPUNCTURE: CPT

## 2023-03-08 PROCEDURE — 6370000000 HC RX 637 (ALT 250 FOR IP): Performed by: NURSE PRACTITIONER

## 2023-03-08 PROCEDURE — 83735 ASSAY OF MAGNESIUM: CPT

## 2023-03-08 PROCEDURE — 6360000002 HC RX W HCPCS: Performed by: THORACIC SURGERY (CARDIOTHORACIC VASCULAR SURGERY)

## 2023-03-08 PROCEDURE — 97530 THERAPEUTIC ACTIVITIES: CPT

## 2023-03-08 PROCEDURE — 97110 THERAPEUTIC EXERCISES: CPT

## 2023-03-08 PROCEDURE — 6370000000 HC RX 637 (ALT 250 FOR IP): Performed by: PHYSICIAN ASSISTANT

## 2023-03-08 PROCEDURE — 6370000000 HC RX 637 (ALT 250 FOR IP): Performed by: INTERNAL MEDICINE

## 2023-03-08 PROCEDURE — 2140000001 HC CVICU INTERMEDIATE R&B

## 2023-03-08 PROCEDURE — 80048 BASIC METABOLIC PNL TOTAL CA: CPT

## 2023-03-08 PROCEDURE — 85027 COMPLETE CBC AUTOMATED: CPT

## 2023-03-08 PROCEDURE — 2700000000 HC OXYGEN THERAPY PER DAY

## 2023-03-08 PROCEDURE — 82962 GLUCOSE BLOOD TEST: CPT

## 2023-03-08 PROCEDURE — 6360000002 HC RX W HCPCS: Performed by: INTERNAL MEDICINE

## 2023-03-08 PROCEDURE — 94761 N-INVAS EAR/PLS OXIMETRY MLT: CPT

## 2023-03-08 RX ORDER — POLYETHYLENE GLYCOL 3350 17 G/17G
17 POWDER, FOR SOLUTION ORAL DAILY PRN
Status: DISCONTINUED | OUTPATIENT
Start: 2023-03-08 | End: 2023-03-10 | Stop reason: HOSPADM

## 2023-03-08 RX ORDER — POTASSIUM CHLORIDE 20 MEQ/1
40 TABLET, EXTENDED RELEASE ORAL 2 TIMES DAILY PRN
Status: DISCONTINUED | OUTPATIENT
Start: 2023-03-08 | End: 2023-03-08

## 2023-03-08 RX ORDER — ASPIRIN 81 MG/1
81 TABLET ORAL DAILY
Status: DISCONTINUED | OUTPATIENT
Start: 2023-03-09 | End: 2023-03-08

## 2023-03-08 RX ORDER — OXYCODONE HYDROCHLORIDE AND ACETAMINOPHEN 5; 325 MG/1; MG/1
1 TABLET ORAL EVERY 4 HOURS PRN
Status: DISCONTINUED | OUTPATIENT
Start: 2023-03-08 | End: 2023-03-09

## 2023-03-08 RX ORDER — FUROSEMIDE 40 MG/1
40 TABLET ORAL DAILY
Status: DISCONTINUED | OUTPATIENT
Start: 2023-03-08 | End: 2023-03-08

## 2023-03-08 RX ORDER — SENNA AND DOCUSATE SODIUM 50; 8.6 MG/1; MG/1
2 TABLET, FILM COATED ORAL 2 TIMES DAILY
Status: DISCONTINUED | OUTPATIENT
Start: 2023-03-08 | End: 2023-03-10 | Stop reason: HOSPADM

## 2023-03-08 RX ORDER — FUROSEMIDE 40 MG/1
40 TABLET ORAL DAILY
Status: DISCONTINUED | OUTPATIENT
Start: 2023-03-09 | End: 2023-03-08

## 2023-03-08 RX ORDER — LANOLIN ALCOHOL/MO/W.PET/CERES
400 CREAM (GRAM) TOPICAL 4 TIMES DAILY PRN
Status: DISCONTINUED | OUTPATIENT
Start: 2023-03-08 | End: 2023-03-10 | Stop reason: HOSPADM

## 2023-03-08 RX ORDER — ACETAMINOPHEN 325 MG/1
650 TABLET ORAL EVERY 4 HOURS PRN
Status: DISCONTINUED | OUTPATIENT
Start: 2023-03-08 | End: 2023-03-10 | Stop reason: HOSPADM

## 2023-03-08 RX ORDER — POTASSIUM CHLORIDE 750 MG/1
10 TABLET, EXTENDED RELEASE ORAL DAILY
Status: DISCONTINUED | OUTPATIENT
Start: 2023-03-09 | End: 2023-03-08

## 2023-03-08 RX ORDER — GUAIFENESIN 600 MG/1
1200 TABLET, EXTENDED RELEASE ORAL 2 TIMES DAILY
Status: DISCONTINUED | OUTPATIENT
Start: 2023-03-08 | End: 2023-03-10 | Stop reason: HOSPADM

## 2023-03-08 RX ORDER — DEXTROSE MONOHYDRATE 100 MG/ML
INJECTION, SOLUTION INTRAVENOUS CONTINUOUS PRN
Status: DISCONTINUED | OUTPATIENT
Start: 2023-03-08 | End: 2023-03-10 | Stop reason: HOSPADM

## 2023-03-08 RX ORDER — POTASSIUM CHLORIDE 20 MEQ/1
20 TABLET, EXTENDED RELEASE ORAL 2 TIMES DAILY PRN
Status: DISCONTINUED | OUTPATIENT
Start: 2023-03-08 | End: 2023-03-10 | Stop reason: HOSPADM

## 2023-03-08 RX ORDER — POTASSIUM CHLORIDE 20 MEQ/1
20 TABLET, EXTENDED RELEASE ORAL 2 TIMES DAILY PRN
Status: DISCONTINUED | OUTPATIENT
Start: 2023-03-08 | End: 2023-03-08

## 2023-03-08 RX ORDER — ONDANSETRON 4 MG/1
4 TABLET, ORALLY DISINTEGRATING ORAL EVERY 8 HOURS PRN
Status: DISCONTINUED | OUTPATIENT
Start: 2023-03-08 | End: 2023-03-10 | Stop reason: HOSPADM

## 2023-03-08 RX ORDER — POTASSIUM CHLORIDE 20 MEQ/1
40 TABLET, EXTENDED RELEASE ORAL 2 TIMES DAILY PRN
Status: DISCONTINUED | OUTPATIENT
Start: 2023-03-08 | End: 2023-03-10 | Stop reason: HOSPADM

## 2023-03-08 RX ORDER — LANOLIN ALCOHOL/MO/W.PET/CERES
400 CREAM (GRAM) TOPICAL 3 TIMES DAILY PRN
Status: DISCONTINUED | OUTPATIENT
Start: 2023-03-08 | End: 2023-03-10 | Stop reason: HOSPADM

## 2023-03-08 RX ORDER — FUROSEMIDE 40 MG/1
40 TABLET ORAL
Status: DISCONTINUED | OUTPATIENT
Start: 2023-03-08 | End: 2023-03-10 | Stop reason: HOSPADM

## 2023-03-08 RX ORDER — SENNA AND DOCUSATE SODIUM 50; 8.6 MG/1; MG/1
1 TABLET, FILM COATED ORAL 2 TIMES DAILY
Status: DISCONTINUED | OUTPATIENT
Start: 2023-03-08 | End: 2023-03-08

## 2023-03-08 RX ORDER — FAMOTIDINE 20 MG/1
20 TABLET, FILM COATED ORAL 2 TIMES DAILY
Status: DISCONTINUED | OUTPATIENT
Start: 2023-03-08 | End: 2023-03-10 | Stop reason: HOSPADM

## 2023-03-08 RX ORDER — ONDANSETRON 2 MG/ML
4 INJECTION INTRAMUSCULAR; INTRAVENOUS EVERY 6 HOURS PRN
Status: DISCONTINUED | OUTPATIENT
Start: 2023-03-08 | End: 2023-03-10 | Stop reason: HOSPADM

## 2023-03-08 RX ORDER — AMIODARONE HYDROCHLORIDE 200 MG/1
200 TABLET ORAL 2 TIMES DAILY
Status: DISCONTINUED | OUTPATIENT
Start: 2023-03-08 | End: 2023-03-10 | Stop reason: HOSPADM

## 2023-03-08 RX ORDER — SODIUM CHLORIDE 0.9 % (FLUSH) 0.9 %
5-40 SYRINGE (ML) INJECTION PRN
Status: DISCONTINUED | OUTPATIENT
Start: 2023-03-08 | End: 2023-03-10 | Stop reason: HOSPADM

## 2023-03-08 RX ORDER — SODIUM CHLORIDE 0.9 % (FLUSH) 0.9 %
5-40 SYRINGE (ML) INJECTION EVERY 12 HOURS SCHEDULED
Status: DISCONTINUED | OUTPATIENT
Start: 2023-03-08 | End: 2023-03-10 | Stop reason: HOSPADM

## 2023-03-08 RX ORDER — ATORVASTATIN CALCIUM 80 MG/1
80 TABLET, FILM COATED ORAL NIGHTLY
Status: DISCONTINUED | OUTPATIENT
Start: 2023-03-08 | End: 2023-03-10

## 2023-03-08 RX ORDER — ASPIRIN 81 MG/1
81 TABLET ORAL DAILY
Status: DISCONTINUED | OUTPATIENT
Start: 2023-03-08 | End: 2023-03-10 | Stop reason: HOSPADM

## 2023-03-08 RX ORDER — POTASSIUM CHLORIDE 750 MG/1
10 TABLET, EXTENDED RELEASE ORAL DAILY
Status: COMPLETED | OUTPATIENT
Start: 2023-03-08 | End: 2023-03-10

## 2023-03-08 RX ADMIN — ONDANSETRON 4 MG: 4 TABLET, ORALLY DISINTEGRATING ORAL at 16:15

## 2023-03-08 RX ADMIN — POTASSIUM CHLORIDE 10 MEQ: 750 TABLET, EXTENDED RELEASE ORAL at 09:05

## 2023-03-08 RX ADMIN — FAMOTIDINE 20 MG: 20 TABLET ORAL at 09:05

## 2023-03-08 RX ADMIN — FUROSEMIDE 40 MG: 40 TABLET ORAL at 15:30

## 2023-03-08 RX ADMIN — AMIODARONE HYDROCHLORIDE 200 MG: 200 TABLET ORAL at 09:04

## 2023-03-08 RX ADMIN — LEVOTHYROXINE SODIUM 100 MCG: 0.1 TABLET ORAL at 05:55

## 2023-03-08 RX ADMIN — SENNOSIDES AND DOCUSATE SODIUM 2 TABLET: 8.6; 5 TABLET ORAL at 09:05

## 2023-03-08 RX ADMIN — SODIUM CHLORIDE, PRESERVATIVE FREE 10 ML: 5 INJECTION INTRAVENOUS at 21:31

## 2023-03-08 RX ADMIN — Medication 1 AMPULE: at 21:31

## 2023-03-08 RX ADMIN — Medication 1 AMPULE: at 09:02

## 2023-03-08 RX ADMIN — IPRATROPIUM BROMIDE AND ALBUTEROL SULFATE 1 AMPULE: .5; 3 SOLUTION RESPIRATORY (INHALATION) at 07:58

## 2023-03-08 RX ADMIN — FAMOTIDINE 20 MG: 20 TABLET ORAL at 21:30

## 2023-03-08 RX ADMIN — OXYCODONE AND ACETAMINOPHEN 1 TABLET: 5; 325 TABLET ORAL at 09:06

## 2023-03-08 RX ADMIN — AMIODARONE HYDROCHLORIDE 200 MG: 200 TABLET ORAL at 21:30

## 2023-03-08 RX ADMIN — FUROSEMIDE 40 MG: 40 TABLET ORAL at 09:08

## 2023-03-08 RX ADMIN — ASPIRIN 81 MG: 81 TABLET ORAL at 09:05

## 2023-03-08 RX ADMIN — GUAIFENESIN 1200 MG: 600 TABLET ORAL at 09:02

## 2023-03-08 RX ADMIN — SENNOSIDES AND DOCUSATE SODIUM 2 TABLET: 8.6; 5 TABLET ORAL at 21:30

## 2023-03-08 RX ADMIN — ROSUVASTATIN CALCIUM 40 MG: 20 TABLET, COATED ORAL at 21:30

## 2023-03-08 RX ADMIN — ATORVASTATIN CALCIUM 80 MG: 80 TABLET, FILM COATED ORAL at 21:30

## 2023-03-08 RX ADMIN — BUDESONIDE 500 MCG: 0.5 INHALANT RESPIRATORY (INHALATION) at 07:58

## 2023-03-08 RX ADMIN — METOPROLOL TARTRATE 12.5 MG: 25 TABLET, FILM COATED ORAL at 21:31

## 2023-03-08 RX ADMIN — GUAIFENESIN 1200 MG: 600 TABLET ORAL at 21:30

## 2023-03-08 RX ADMIN — ACETAMINOPHEN 650 MG: 325 TABLET ORAL at 21:30

## 2023-03-08 RX ADMIN — KETOROLAC TROMETHAMINE 15 MG: 15 INJECTION, SOLUTION INTRAMUSCULAR; INTRAVENOUS at 05:55

## 2023-03-08 RX ADMIN — SODIUM CHLORIDE, PRESERVATIVE FREE 10 ML: 5 INJECTION INTRAVENOUS at 09:20

## 2023-03-08 RX ADMIN — TRAMADOL HYDROCHLORIDE 50 MG: 50 TABLET ORAL at 15:30

## 2023-03-08 ASSESSMENT — PAIN SCALES - GENERAL
PAINLEVEL_OUTOF10: 5
PAINLEVEL_OUTOF10: 6
PAINLEVEL_OUTOF10: 7
PAINLEVEL_OUTOF10: 6
PAINLEVEL_OUTOF10: 4

## 2023-03-08 ASSESSMENT — PAIN - FUNCTIONAL ASSESSMENT
PAIN_FUNCTIONAL_ASSESSMENT: ACTIVITIES ARE NOT PREVENTED
PAIN_FUNCTIONAL_ASSESSMENT: ACTIVITIES ARE NOT PREVENTED

## 2023-03-08 ASSESSMENT — PAIN DESCRIPTION - DESCRIPTORS
DESCRIPTORS: SORE;ACHING
DESCRIPTORS: SORE
DESCRIPTORS: SORE

## 2023-03-08 ASSESSMENT — PAIN DESCRIPTION - LOCATION
LOCATION: CHEST;INCISION
LOCATION: CHEST
LOCATION: CHEST;INCISION

## 2023-03-08 ASSESSMENT — PAIN DESCRIPTION - ORIENTATION
ORIENTATION: ANTERIOR;MID
ORIENTATION: ANTERIOR;MID
ORIENTATION: MID

## 2023-03-08 NOTE — PROCEDURES
Summary:    After informed consent was obtained, the patient was positioned upright in the usual fashion. Ultrasound was used to identify the optimal spot for pleural drainage. Scant/Small effusion noted b/l. No attempt made to drain. EBL --none    Patient stable post procedure    No samples sent.      Signed By: Missy Nelson MD

## 2023-03-08 NOTE — PROGRESS NOTES
Pulmonary Daily Progresss  Note: 3/8/2023  Albino Apple                                                     Admission Date: 3/3/2023     Hospital course. The patient's chart is reviewed and the patient is discussed with the staff. Patient is seen at the request of Shira Leiva MD  for respiratory management status post cardiac surgery - CABG x 4  Patient had Chest pain. Currently is sedated in CV-ICU and orally intubated receiving  mechanical ventilation. We have been asked to see in the CV-ICU for mechanical ventilation management and weaning. Patient is a 59 y.o.  female seen and evaluated at the request of Dr. Bonnie Lerma prior to CABG. Patient has a PMH of HLD (has been intolerant to treatment with myalgias in past), fatty liver, and hypothyroid. She is a current smoker, smokes up to 1 ppd for 45 years. She says now she usually only smokes 4-5 total cigarettes. She presented to ED on 3/2 with chest pain/shoulder pain that started that day while she was at work. Cardiology admitted patient with plans for LHC. Troponins were negative. EKG showed SR with diffuse inverted T waves. LHC completed on 3/3 and showed multivessel disease with plans for CABG. She denies any history of COPD/emphysema. Does not use O2 at home. No inhaler or nebulizer use. States she doesn't like to take medications. Says she has some shortness of breath daily with congested cough occasionally. Seen immediately after her LHC and she is quite anxious and irritable. Wants to go smoke. Subjective:   Pateint awake and alert doing well. Mild dyspnea today. On 1 LPM at this time.       Current Outpatient Medications   Medication Instructions    levothyroxine (SYNTHROID) 100 mcg, Oral, DAILY      Review of Systems:   Review of Systems:  -Fever  -Headaches  -Chest pain  +Dyspnea, -wheezing,- cough  -Abdominal pain, -constipation  +Leg swelling  All other organ systems grossly normal.      Past Medical History:   Diagnosis Date    Hyperlipidemia     Hypothyroid      Past Surgical History:   Procedure Laterality Date    CARDIAC PROCEDURE N/A 3/3/2023    LEFT HEART CATH / CORONARY ANGIOGRAPHY performed by Janice Bello MD at [facility] CARDIAC CATH LAB    CHOLECYSTECTOMY      CORONARY ARTERY BYPASS GRAFT N/A 3/6/2023    CORONARY ARTERY BYPASS GRAFT (CABG X 4), LIMA; ENDOSCOPIC VEIN HARVEST, LEFT GREATER SAPHENOUS VEIN performed by Nnamdi Wright MD at [facility] MAIN OR    GYN      hysterectomy    TRANSESOPHAGEAL ECHOCARDIOGRAM N/A 3/6/2023    TRANSESOPHAGEAL ECHOCARDIOGRAM performed by Nnamdi Wright MD at [facility] MAIN OR     Social History     Socioeconomic History    Marital status:      Spouse name: Not on file    Number of children: Not on file    Years of education: Not on file    Highest education level: Not on file   Occupational History    Not on file   Tobacco Use    Smoking status: Every Day     Packs/day: 1.00     Types: Cigarettes    Smokeless tobacco: Never   Substance and Sexual Activity    Alcohol use: No    Drug use: No    Sexual activity: Not on file   Other Topics Concern    Not on file   Social History Narrative    Not on file     Social Determinants of Health     Financial Resource Strain: Not on file   Food Insecurity: Not on file   Transportation Needs: Not on file   Physical Activity: Not on file   Stress: Not on file   Social Connections: Not on file   Intimate Partner Violence: Not on file   Housing Stability: Not on file     Family History   Problem Relation Age of Onset    No Known Problems Sister     No Known Problems Brother      Allergies   Allergen Reactions    Pcn [Penicillins] Nausea And Vomiting     Objective:   Blood pressure 97/63, pulse 62, temperature 97.9 °F (36.6 °C), temperature source Oral, resp. rate 20, height 5' 7.99\" (1.727 m), weight 183 lb (83 kg), SpO2 91 %.    Intake/Output Summary (Last 24 hours) at 3/8/2023 1327  Last data filed at 3/8/2023 1300  Gross per 24 hour   Intake 245 ml   Output 1575 ml   Net -1330 ml       Physical Exam:          Constitutional: Up in chair, awake and alert  EENMT:  Sclera clear, pupils equal, oral mucosa moist   Respiratory: decreased sounds in the bases. No wheezing  Cardiovascular:  RRR with no M,G,R;  Gastrointestinal:  soft; + bowel sounds present  Musculoskeletal:  warm with no cyanosis, no lower extremity edema. [place] site left leg with ace wrap. SKIN:  no jaundice or ecchymosis   Neurologic:   no gross neuro deficits  Psychiatric:  awake and alert. CXR:  CM with worse congestion and ?left pleural effusion. Atelectasis and low lung volumes OTW clear    ETT noted and normal post op changes            Recent Labs     03/06/23  1011 03/06/23  1026 03/06/23  1044 03/06/23  1125 03/06/23  1225   PHAPOC 7.45 7.44 7.43 7.40 7.42   RVH4VPYD 36.9 38.3 39.0 37.1 36.8   UR1FDMR 304* 285* 260* 155* 193*   QGI1GOU 25.5 25.8 25.9 23.1 23.9   BE 1.4 1.6 1.5  --   --        Recent Labs     03/06/23  1233 03/06/23  1656 03/06/23 2107 03/07/23 0318 03/08/23  0546   WBC 9.1 9.4  --  8.9 8.3   HGB 11.2* 10.7* 10.9* 10.2* 10.0*   HCT 33.8* 33.5* 34.0* 32.2* 32.5*   * 150  --  144* 141*   INR 1.1  --   --   --   --        Recent Labs     03/06/23  1233 03/06/23  1656 03/06/23 2107 03/07/23 0318 03/08/23  0546     --   --  139 135   K 3.8   < > 4.4 4.5 4.5   *  --   --  113* 104   CO2 25  --   --  24 29   BUN 14  --   --  14 13   CREATININE 0.80  --   --  0.80 0.90   MG 3.3*   < > 2.5* 2.5* 2.5*    < > = values in this interval not displayed. No results for input(s): PROCAL, LACTATE in the last 72 hours. 03/03/23    TRANSTHORACIC ECHOCARDIOGRAM (TTE) COMPLETE (CONTRAST/BUBBLE/3D PRN) 03/03/2023 12:03 PM, 03/03/2023 12:00 AM (Final)    Interpretation Summary    Left Ventricle: Normal left ventricular systolic function with a visually estimated EF of 60 - 65%.  Left ventricle size is normal. Normal wall thickness. Normal wall motion. Normal diastolic function. Tricuspid Valve: The estimated RVSP is 21 mmHg. Technical qualifiers: Color flow Doppler was performed and pulse wave and/or continuous wave Doppler was performed. Signed by: Almita Lilly MD on 3/3/2023 12:03 PM, Signed by: Unknown Provider Result on 3/3/2023 12:00 AM    Assessment and Plan :  (Medical Decision Making)   Impression: 59 y.o. y/o female s/p CV surgery for S/P CABG x 4, extubated 03/06/23    Encounter for weaning from Ventilator:  Successfully weaned- mobilize per protocol    Hypoxemia:   --down to 1-2 LPM now. Continue IS    Atelectasis:   IS, pain control and mobilization  S/P Cardiovascular surgery:  S/p CABG x 4  --per CV surgery. Tobacco Abuse  --will need evaluation for COPD in the future. --continue BD for now. CHF  --noted worse x-ray. --agree with lasix  ? pleural effusion and will check with US    Dysphagia  --reported with rice and with water occasionally. --will get Speech to see. More than 50% of the time documented was spent in face-to-face contact with the patient and in the care of the patient on the floor/unit where the patient is located.            Gayatri Castillo MD

## 2023-03-08 NOTE — CARE COORDINATION
This CM met with pt this day to complete assessment. Pt verified her address and emergency contact. Pt confirms she is uninsured and does not have a PCP (has not seen one since 2020 per chart notes). She lives at home with spouse with no steps to enter and no home DME. She reports was not taking prescription medications in the community. She confirms that at baseline prior to admission she is independent with her ADLs including bathing, dressing, cooking, and driving. We discussed discharge planning this day. Pt confirms she will be returning home with spouse when medically stable. Pt lives in [facility] and therefore, CM can not arrange [facility] at discharge as they do not service that [facility]. CM discussed making PCP referral to get her established with a new provider - she is agreeable. Referral to be made this da. No additional CM needs at this time. Will continue to monitor and update as needed. 03/03/23 7077   Service Assessment   Patient Orientation Alert and Oriented   Cognition Alert   History Provided By Patient   Primary Caregiver Self   Support Systems Spouse/Significant Other   Patient's Healthcare Decision Maker is: Legal Next of Kin   PCP Verified by CM Yes  (Pt does not have one)   Prior Functional Level Independent in ADLs/IADLs   Current Functional Level Other (see comment)  (TBD by clinical team)   Can patient return to prior living arrangement Yes   Ability to make needs known: Good   Family able to assist with home care needs: Yes   Would you like for me to discuss the discharge plan with any other family members/significant others, and if so, who? No   Financial Resources Other (Comment)  (Commercial)   Social/Functional History   Receives Help From Family   Mode of Transportation Car   Discharge Planning   Potential Assistance Needed N/A   DME Ordered?  No   Potential Assistance Purchasing Medications No   Services At/After Discharge   Transition of Care Consult (CM Consult) Discharge [address] Discharge None   [employer] Information Provided?  No   Mode of Transport at Discharge   (Car)   Confirm Follow Up Transport Family   Condition of Participation: Discharge Planning   The Plan for Transition of Care is related to the following treatment goals: Home with spouse

## 2023-03-08 NOTE — PROGRESS NOTES
ACUTE PHYSICAL THERAPY GOALS:   (Developed with and agreed upon by patient and/or caregiver.)   Ms. Deysi Goodwin will perform supine to sit and sit to supine independently with bed flat and no rails in 7 days   Ms. Deysi Goodwin will perform sit to stand and bed to chair independently in 7 days. Ms. Deysi Goodwin will perform gait >1000 ft independently in 7 days. Ms. Deysi Goodwin will perform therex x 25 reps in sitting and standing in 7 days. PHYSICAL THERAPY: Daily Note AM   (Link to Caseload Tracking: PT Visit Days : 2  Time In/Out PT Charge Capture  Rehab Caseload Tracker  Orders    Eloy Landry is a 59 y.o. female   PRIMARY DIAGNOSIS: S/P CABG x 4  Chest pain [R07.9]  CAD, multiple vessel [I25.10]  Procedure(s) (LRB):  CORONARY ARTERY BYPASS GRAFT (CABG X 4), LIMA; ENDOSCOPIC VEIN HARVEST, LEFT GREATER SAPHENOUS VEIN (N/A)  TRANSESOPHAGEAL ECHOCARDIOGRAM (N/A)  2 Days Post-Op  Inpatient: Payor: /     ASSESSMENT:     REHAB RECOMMENDATIONS:   Recommendation to date pending progress:  Setting:  Home Health Therapy    Equipment:    To Be Determined     ASSESSMENT:  Ms. Deysi Goodwin is sitting in the recliner and agreeable to therapy. Patient is a bit anxious and is tangled up in all of her tubing, this writer is trying to help her get untangled but she is moving constantly but we did get everything straightened out. Sit to stand with contact guard assist as the patient can be impulsive, balance is fair+,  gait training with rolling walker x 150 feet with good gautam however the patient is dancing  and talking to staff members and peeking in windows and more. She is returned to the recliner and after a rest break is instructed in therapeutic exercises, tolerated well. Good session and progress demonstrated. Continue PT efforts. Claritza Jones HHPT at d/c     SUBJECTIVE:   Ms. Deysi Goodwin states, \"Hey\"     Social/Functional Lives With: Significant other  Type of Home: House  Home Layout: One level  Home Access: Ramped entrance  [address] Help From: Family  Ambulation Assistance: Independent  Transfer Assistance: Independent  Mode of Transportation: Car  Occupation: Full time employment  Type of Occupation:  (stocking shelves)  OBJECTIVE:     PAIN: Delrae Popper / O2: PRECAUTION / Shlomo Mari / Gavino Saliva:   Pre Treatment: 0/10         Post Treatment: /010 Vitals        Oxygen    Wound Vac and O2    RESTRICTIONS/PRECAUTIONS:        MOBILITY: I Mod I S SBA CGA Min Mod Max Total  NT x2 Comments:   Bed Mobility    Rolling [] [] [] [] [] [] [] [] [] [x] []    Supine to Sit [] [] [] [] [] [] [] [] [] [x] []    Scooting [] [] [] [] [] [] [] [] [] [x] []    Sit to Supine [] [] [] [] [] [] [] [] [] [x] []    Transfers    Sit to Stand [] [] [] [] [x] [] [] [] [] [] []    Bed to Chair [] [] [] [] [] [] [] [] [] [x] []    Stand to Sit [] [] [] [] [x] [] [] [] [] [] []     [] [] [] [] [] [] [] [] [] [] []    I=Independent, Mod I=Modified Independent, S=Supervision, SBA=Standby Assistance, CGA=Contact Guard Assistance,   Min=Minimal Assistance, Mod=Moderate Assistance, Max=Maximal Assistance, Total=Total Assistance, NT=Not Tested    BALANCE: Good Fair+ Fair Fair- Poor NT Comments   Sitting Static [x] [] [] [] [] []    Sitting Dynamic [x] [] [] [] [] []              Standing Static [] [] [] [] [] []    Standing Dynamic [] [] [x] [] [] []      GAIT: I Mod I S SBA CGA Min Mod Max Total  NT x2 Comments:   Level of Assistance [] [] [] [] [] [x] [] [] [] [] []    Distance   150  feet    DME Rolling Walker    Gait Quality Decreased gautam , Decreased step clearance, and Decreased step length    Weightbearing Status      Stairs      I=Independent, Mod I=Modified Independent, S=Supervision, SBA=Standby Assistance, CGA=Contact Guard Assistance,   Min=Minimal Assistance, Mod=Moderate Assistance, Max=Maximal Assistance, Total=Total Assistance, NT=Not Tested    PLAN:   FREQUENCY AND DURATION: BID for duration of hospital stay or until stated goals are met, whichever comes first.    TREATMENT:   TREATMENT:   Therapeutic Activity (15 Minutes): Therapeutic activity included Scooting, Transfer Training, Ambulation on level ground, Sitting balance , and Standing balance to improve functional Activity tolerance, Balance, Coordination, Mobility, and Strength.  Therapeutic Exercise (10 Minutes): Therapeutic exercises noted below to improve functional activity tolerance, AROM, strength, and mobility.     TREATMENT GRID:     Date:  03/08/23 Date:   Date:     ACTIVITY/EXERCISE AM PM AM PM AM PM   LAQ 15        Shoulder shrugs 15        Gluteal sets 15        marching 15        Ankle pumps 15        abduction 15                 B = bilateral; AA = active assistive; A = active; P = passive    AFTER TREATMENT PRECAUTIONS: Alarm Activated, Bed/Chair Locked, Call light within reach, Chair, Needs within reach, RN notified, and Visitors at bedside    INTERDISCIPLINARY COLLABORATION:  RN/ PCT and PT/ PTA    EDUCATION:  review POC and importance of mobility in the recovery process    TIME IN/OUT:  Time In: 1027  Time Out: 1052  Minutes: 25    Scarlet Mckeon PTA

## 2023-03-08 NOTE — PLAN OF CARE
Problem: Discharge Planning  Goal: Discharge to home or other facility with appropriate resources  3/8/2023 1027 by Milagros Henderson RN  Outcome: Progressing  Flowsheets (Taken 3/8/2023 0710)  Discharge to home or other facility with appropriate resources:   Identify barriers to discharge with patient and caregiver   Arrange for needed discharge resources and transportation as appropriate   Identify discharge learning needs (meds, wound care, etc)   Refer to discharge planning if patient needs post-hospital services based on physician order or complex needs related to functional status, cognitive ability or social support system  3/8/2023 0035 by Albert Lazo RN  Outcome: Progressing  Flowsheets  Taken 3/7/2023 2229 by Albert Lazo RN  Discharge to home or other facility with appropriate resources:   Identify barriers to discharge with patient and caregiver   Arrange for needed discharge resources and transportation as appropriate   Identify discharge learning needs (meds, wound care, etc)  Taken 3/7/2023 1900 by Freddy Soria RN  Discharge to home or other facility with appropriate resources:   Identify barriers to discharge with patient and caregiver   Arrange for needed discharge resources and transportation as appropriate   Identify discharge learning needs (meds, wound care, etc)   Refer to discharge planning if patient needs post-hospital services based on physician order or complex needs related to functional status, cognitive ability or social support system     Problem: Pain  Goal: Verbalizes/displays adequate comfort level or baseline comfort level  3/8/2023 1027 by Milagros Henderson RN  Outcome: Progressing  3/8/2023 0035 by Albert Lazo RN  Outcome: Progressing     Problem: Safety - Adult  Goal: Free from fall injury  3/8/2023 1027 by Milagros Henderson RN  Outcome: Progressing  3/8/2023 0035 by Albert Lazo RN  Outcome: Progressing  Flowsheets (Taken 3/7/2023 2229)  Free From Fall Injury: Instruct family/caregiver on patient safety   Based on caregiver fall risk screen, instruct family/caregiver to ask for assistance with transferring infant if caregiver noted to have fall risk factors

## 2023-03-08 NOTE — PLAN OF CARE
Problem: Discharge Planning  Goal: Discharge to home or other facility with appropriate resources  Outcome: Progressing  Flowsheets (Taken 3/7/2023 2229)  Discharge to home or other facility with appropriate resources:   Identify barriers to discharge with patient and caregiver   Arrange for needed discharge resources and transportation as appropriate   Identify discharge learning needs (meds, wound care, etc)     Problem: Pain  Goal: Verbalizes/displays adequate comfort level or baseline comfort level  Outcome: Progressing     Problem: Safety - Adult  Goal: Free from fall injury  Outcome: Progressing  Flowsheets (Taken 3/7/2023 2229)  Free From Fall Injury:   Instruct family/caregiver on patient safety   Based on caregiver fall risk screen, instruct family/caregiver to ask for assistance with transferring infant if caregiver noted to have fall risk factors     Problem: Respiratory - Adult  Goal: Achieves optimal ventilation and oxygenation  Outcome: Progressing     Problem: Skin/Tissue Integrity  Goal: Absence of new skin breakdown  Description: 1. Monitor for areas of redness and/or skin breakdown  2. Assess vascular access sites hourly  3. Every 4-6 hours minimum:  Change oxygen saturation probe site  4. Every 4-6 hours:  If on nasal continuous positive airway pressure, respiratory therapy assess nares and determine need for appliance change or resting period.   Outcome: Progressing

## 2023-03-08 NOTE — PROGRESS NOTES
Today's Date: 3/8/2023  Date of Admission: 3/3/2023    Chart Reviewed. Subjective:     Patient feels ok. Appetite fair but she doesn't like the food. She is having trouble getting sputum up. Medications Reviewed. Objective:     Vitals:    03/08/23 0600 03/08/23 0714 03/08/23 0759 03/08/23 0800   BP:  (!) 89/56     Pulse:  62 64 61   Resp:  16 19    Temp:  98.4 °F (36.9 °C)     TempSrc:  Oral     SpO2:  95% 94%    Weight: 183 lb (83 kg)      Height:           Intake and Output  Current Shift: No intake/output data recorded. Last 3 Shifts: 03/06 1901 - 03/08 0700  In: 2338.7 [P.O.:905; I.V.:1433.7]  Out: 2355 [Urine:2255]    Physical Exam:  General: Well Developed, Well Nourished, No Acute Distress, Alert & Oriented x 3, Appropriate mood  Neck: supple, no JVD  Heart: S1S2 with RRR without murmurs or gallops  Lungs: Clear throughout auscultation bilaterally without adventitious sounds  Abd: soft, nontender, nondistended, with good bowel sounds  Ext: no edema bilaterally  Sternal incision: wound vac in place   Skin: warm and dry    LABS  Data Review:   Recent Labs     03/06/23  1233 03/06/23  1656 03/07/23  0318 03/08/23  0546     --  139 135   K 3.8   < > 4.5 4.5   MG 3.3*   < > 2.5* 2.5*   BUN 14  --  14 13   WBC 9.1   < > 8.9 8.3   HGB 11.2*   < > 10.2* 10.0*   HCT 33.8*   < > 32.2* 32.5*   *   < > 144* 141*   INR 1.1  --   --   --     < > = values in this interval not displayed. Estimated Creatinine Clearance: 73 mL/min (based on SCr of 0.9 mg/dL).       Assessment/Plan:     *S/P CABG x 4  On ASA, low dose BB as tolerated, statin, on 1 liter of O2, pacing wires removed, continue PT    Active Hospital Problems    Chest pain  S/p CABG      Hypertension  BP low, holding meds      Elevated LFTs  Minimally elevated, follow on statin       CAD, multiple vessel  S/p CABG        Atherosclerotic heart disease of native coronary artery with unstable angina pectoris ([address]  S/p CABG Familial hypercholesteremia  Continue statin, was not on treatment previously, may need Repatha if not at goal in 3 months      Cigarette smoker  Cessation encouraged       Hypothyroid  On synthroid      Chandni Weems PA-C

## 2023-03-08 NOTE — PROGRESS NOTES
TRANSFER - OUT REPORT:    Verbal report given to Janay Oliveros RN on Itzel Dorman  being transferred to [facility] for routine progression of patient care       Report consisted of patient's Situation, Background, Assessment and   Recommendations (SBAR). Information from the following report(s) Nurse Handoff Report was reviewed with the receiving nurse. [place] Assessment: No data recorded  Lines:   Peripheral IV 03/04/23 Left Antecubital (Active)   Site Assessment Clean, dry & intact 03/07/23 2100   Line Status No blood return;Flushed;Normal saline locked; Capped 03/07/23 2100   Line Care Connections checked and tightened;Ports disinfected;Cap changed 03/07/23 2100   Phlebitis Assessment No symptoms 03/07/23 2100   Infiltration Assessment 0 03/07/23 2100   Alcohol Cap Used Yes 03/07/23 2100   Dressing Status Clean, dry & intact 03/07/23 2100   Dressing Type Transparent 03/07/23 2100   Dressing Intervention New 03/04/23 1300        Opportunity for questions and clarification was provided. Patient transported with:  Monitor, O2 @ 2lpm, and Registered Nurse.

## 2023-03-08 NOTE — PROGRESS NOTES
Pacing wires pulled per Saunders Coleman, PA. Patient instructed to one hour bedrest with every 15 minute blood pressure checks for one hour. Pt voices understanding.

## 2023-03-08 NOTE — PROGRESS NOTES
ACUTE PHYSICAL THERAPY GOALS:   (Developed with and agreed upon by patient and/or caregiver.)   Ms. Antionette Quan will perform supine to sit and sit to supine independently with bed flat and no rails in 7 days   Ms. Antionette Quan will perform sit to stand and bed to chair independently in 7 days. Ms. Antionette Quan will perform gait >1000 ft independently in 7 days. Ms. Antionette Quan will perform therex x 25 reps in sitting and standing in 7 days. PHYSICAL THERAPY: Daily Note PM   (Link to Caseload Tracking: PT Visit Days : 2  Time In/Out PT Charge Capture  Rehab Caseload Tracker  Orders    Jj Parnell is a 59 y.o. female   PRIMARY DIAGNOSIS: S/P CABG x 4  Chest pain [R07.9]  CAD, multiple vessel [I25.10]  Procedure(s) (LRB):  CORONARY ARTERY BYPASS GRAFT (CABG X 4), LIMA; ENDOSCOPIC VEIN HARVEST, LEFT GREATER SAPHENOUS VEIN (N/A)  TRANSESOPHAGEAL ECHOCARDIOGRAM (N/A)  2 Days Post-Op  Inpatient: Payor: /     ASSESSMENT:     REHAB RECOMMENDATIONS:   Recommendation to date pending progress:  Setting:  Home Health Therapy    Equipment:    To Be Determined     ASSESSMENT:  Ms. Antionette Quan is sitting in the recliner and agreeable to therapy. Patient is a bit anxious and is tangled up in all of her tubing, this writer is trying to help her get untangled but she is moving constantly but we did get everything straightened out. Sit to stand with contact guard assist as the patient can be impulsive, balance is fair+,  gait training with rolling walker x 150 feet with good gautam however the patient is dancing  and talking to staff members and peeking in windows and more. She is returned to the recliner and after a rest break is instructed in therapeutic exercises, tolerated well. Good session and progress demonstrated. Continue PT efforts. Lauren Martinez HHPT at d/c    PM note:  Patient is agreeable. Wife at bedside.   Patient is able to maintain her am gait distance and complete exercises, but appears a little more tired this pm.  Left with needs within reach. Continue PT efforts.        SUBJECTIVE:   Ms. 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[employer])  OBJECTIVE:     PAIN: [place] / O2: PRECAUTION / Lei Party / Luisrlenoren Addy:   Pre Treatment: 0/10         Post Treatment: /010 Vitals        Oxygen    Wound Vac and O2    RESTRICTIONS/PRECAUTIONS:        MOBILITY: I Mod I S SBA CGA Min Mod Max Total  NT x2 Comments:   Bed Mobility    Rolling [] [] [] [] [] [] [] [] [] [x] []    Supine to Sit [] [] [] [] [] [] [] [] [] [x] []    Scooting [] [] [] [] [] [] [] [] [] [x] []    Sit to Supine [] [] [] [] [] [] [] [] [] [x] []    Transfers    Sit to Stand [] [] [] [] [x] [] [] [] [] [] []    Bed to Chair [] [] [] [] [] [] [] [] [] [x] []    Stand to Sit [] [] [] [] [x] [] [] [] [] [] []     [] [] [] [] [] [] [] [] [] [] []    I=Independent, Mod I=Modified Independent, S=Supervision, SBA=Standby Assistance, CGA=Contact Guard Assistance,   Min=Minimal Assistance, Mod=Moderate Assistance, Max=Maximal Assistance, Total=Total Assistance, NT=Not Tested    BALANCE: Good Fair+ Fair Fair- Poor NT Comments   Sitting Static [x] [] [] [] [] []    Sitting Dynamic [x] [] [] [] [] []              Standing Static [] [] [] [] [] []    Standing Dynamic [] [] [x] [] [] []      GAIT: I Mod I S SBA CGA Min Mod Max Total  NT x2 Comments:   Level of Assistance [] [] [] [] [] [x] [] [] [] [] []    Distance   150  feet    DME Rolling Walker    Gait Quality Decreased gautam , Decreased step clearance, and Decreased step length    Weightbearing Status      Stairs      I=Independent, Mod I=Modified Independent, S=Supervision, SBA=Standby Assistance, CGA=Contact [employer], Min=Minimal Assistance, Mod=Moderate Assistance, Max=Maximal Assistance, Total=Total Assistance, NT=Not Tested    PLAN:   FREQUENCY AND DURATION: BID for duration of hospital stay or until stated goals are met, whichever comes first.    TREATMENT:   TREATMENT:   Therapeutic Activity (14 Minutes): Therapeutic activity included Scooting, Transfer Training, Ambulation on level ground, Sitting balance , and Standing balance to improve functional Activity tolerance, Balance, Coordination, Mobility, and Strength. Therapeutic Exercise (10 Minutes): Therapeutic exercises noted below to improve functional activity tolerance, AROM, strength, and mobility.      TREATMENT GRID:     Date:  03/08/23 Date:   Date:     ACTIVITY/EXERCISE AM PM AM PM AM PM   LAQ 15 15       Shoulder shrugs 15 15       Gluteal sets 15 15       marching 15 15       Ankle pumps 15 15       abduction 15 15                B = bilateral; AA = active assistive; A = active; P = passive    AFTER TREATMENT PRECAUTIONS: Alarm Activated, Bed/Chair Locked, Call light within reach, Chair, Needs within reach, RN notified, and Visitors at bedside    INTERDISCIPLINARY COLLABORATION:  RN/ PCT and PT/ PTA    EDUCATION:  review POC and importance of mobility in the recovery process    TIME IN/OUT:  Time In: 1458  Time Out: 712 [facility]  Minutes: 24    Scarlet Kent-Kimber PTA

## 2023-03-08 NOTE — PLAN OF CARE
Problem: Respiratory - Adult  Goal: Achieves optimal ventilation and oxygenation  3/8/2023 1324 by Chelsea Mensah RCP  Outcome: Progressing  Flowsheets (Taken 3/8/2023 1324)  Achieves optimal ventilation and oxygenation:   Assess for changes in respiratory status   Assess for changes in mentation and behavior   Oxygen supplementation based on oxygen saturation or arterial blood gases   Encourage broncho-pulmonary hygiene including cough, deep breathe, incentive spirometry   Assess and instruct to report shortness of breath or any respiratory difficulty   Respiratory therapy support as indicated  3/8/2023 0035 by Adelia Doyle RN  Outcome: Progressing

## 2023-03-09 ENCOUNTER — APPOINTMENT (OUTPATIENT)
Dept: GENERAL RADIOLOGY | Age: 63
DRG: 234 | End: 2023-03-09
Attending: INTERNAL MEDICINE
Payer: COMMERCIAL

## 2023-03-09 LAB
ABO + RH BLD: NORMAL
ANION GAP SERPL CALC-SCNC: 5 MMOL/L (ref 2–11)
BLD PROD TYP BPU: NORMAL
BLOOD BANK DISPENSE STATUS: NORMAL
BLOOD GROUP ANTIBODIES SERPL: NORMAL
BPU ID: NORMAL
BUN SERPL-MCNC: 18 MG/DL (ref 8–23)
CALCIUM SERPL-MCNC: 8.1 MG/DL (ref 8.3–10.4)
CHLORIDE SERPL-SCNC: 104 MMOL/L (ref 101–110)
CO2 SERPL-SCNC: 28 MMOL/L (ref 21–32)
CREAT SERPL-MCNC: 0.8 MG/DL (ref 0.6–1)
CROSSMATCH RESULT: NORMAL
ERYTHROCYTE [DISTWIDTH] IN BLOOD BY AUTOMATED COUNT: 14.9 % (ref 11.9–14.6)
GLUCOSE BLD STRIP.AUTO-MCNC: 118 MG/DL (ref 65–100)
GLUCOSE SERPL-MCNC: 115 MG/DL (ref 65–100)
HCT VFR BLD AUTO: 30 % (ref 35.8–46.3)
HGB BLD-MCNC: 9.7 G/DL (ref 11.7–15.4)
MAGNESIUM SERPL-MCNC: 2.6 MG/DL (ref 1.8–2.4)
MCH RBC QN AUTO: 31 PG (ref 26.1–32.9)
MCHC RBC AUTO-ENTMCNC: 32.3 G/DL (ref 31.4–35)
MCV RBC AUTO: 95.8 FL (ref 82–102)
MM INDURATION, POC: 0 MM (ref 0–5)
NRBC # BLD: 0 K/UL (ref 0–0.2)
PLATELET # BLD AUTO: 145 K/UL (ref 150–450)
PMV BLD AUTO: 11.3 FL (ref 9.4–12.3)
POTASSIUM SERPL-SCNC: 3.9 MMOL/L (ref 3.5–5.1)
PPD, POC: NEGATIVE
RBC # BLD AUTO: 3.13 M/UL (ref 4.05–5.2)
SERVICE CMNT-IMP: ABNORMAL
SODIUM SERPL-SCNC: 137 MMOL/L (ref 133–143)
SPECIMEN EXP DATE BLD: NORMAL
UNIT DIVISION: 0
WBC # BLD AUTO: 8.5 K/UL (ref 4.3–11.1)

## 2023-03-09 PROCEDURE — 82962 GLUCOSE BLOOD TEST: CPT

## 2023-03-09 PROCEDURE — 6370000000 HC RX 637 (ALT 250 FOR IP): Performed by: THORACIC SURGERY (CARDIOTHORACIC VASCULAR SURGERY)

## 2023-03-09 PROCEDURE — 92610 EVALUATE SWALLOWING FUNCTION: CPT

## 2023-03-09 PROCEDURE — 83735 ASSAY OF MAGNESIUM: CPT

## 2023-03-09 PROCEDURE — 85027 COMPLETE CBC AUTOMATED: CPT

## 2023-03-09 PROCEDURE — 80048 BASIC METABOLIC PNL TOTAL CA: CPT

## 2023-03-09 PROCEDURE — 2140000001 HC CVICU INTERMEDIATE R&B

## 2023-03-09 PROCEDURE — 6370000000 HC RX 637 (ALT 250 FOR IP): Performed by: PHYSICIAN ASSISTANT

## 2023-03-09 PROCEDURE — 99024 POSTOP FOLLOW-UP VISIT: CPT | Performed by: PHYSICIAN ASSISTANT

## 2023-03-09 PROCEDURE — 2580000003 HC RX 258: Performed by: THORACIC SURGERY (CARDIOTHORACIC VASCULAR SURGERY)

## 2023-03-09 PROCEDURE — 71046 X-RAY EXAM CHEST 2 VIEWS: CPT

## 2023-03-09 PROCEDURE — 97530 THERAPEUTIC ACTIVITIES: CPT

## 2023-03-09 PROCEDURE — 36415 COLL VENOUS BLD VENIPUNCTURE: CPT

## 2023-03-09 RX ORDER — DIMETHICONE, CAMPHOR (SYNTHETIC), MENTHOL, AND PHENOL 1.1; .5; .625; .5 G/100G; G/100G; G/100G; G/100G
OINTMENT TOPICAL PRN
Status: DISCONTINUED | OUTPATIENT
Start: 2023-03-09 | End: 2023-03-10 | Stop reason: HOSPADM

## 2023-03-09 RX ORDER — BISACODYL 5 MG/1
10 TABLET, DELAYED RELEASE ORAL DAILY PRN
Status: DISCONTINUED | OUTPATIENT
Start: 2023-03-09 | End: 2023-03-10 | Stop reason: HOSPADM

## 2023-03-09 RX ORDER — GABAPENTIN 300 MG/1
300 CAPSULE ORAL 3 TIMES DAILY
Status: DISCONTINUED | OUTPATIENT
Start: 2023-03-09 | End: 2023-03-10 | Stop reason: HOSPADM

## 2023-03-09 RX ORDER — LACTULOSE 10 G/15ML
20 SOLUTION ORAL PRN
Status: DISCONTINUED | OUTPATIENT
Start: 2023-03-09 | End: 2023-03-10 | Stop reason: HOSPADM

## 2023-03-09 RX ADMIN — GUAIFENESIN 1200 MG: 600 TABLET ORAL at 20:43

## 2023-03-09 RX ADMIN — Medication 1 AMPULE: at 09:16

## 2023-03-09 RX ADMIN — POLYETHYLENE GLYCOL 3350 17 G: 17 POWDER, FOR SOLUTION ORAL at 09:17

## 2023-03-09 RX ADMIN — FUROSEMIDE 40 MG: 40 TABLET ORAL at 17:02

## 2023-03-09 RX ADMIN — POTASSIUM CHLORIDE 10 MEQ: 750 TABLET, EXTENDED RELEASE ORAL at 09:17

## 2023-03-09 RX ADMIN — Medication 1 AMPULE: at 21:03

## 2023-03-09 RX ADMIN — SODIUM CHLORIDE, PRESERVATIVE FREE 10 ML: 5 INJECTION INTRAVENOUS at 21:04

## 2023-03-09 RX ADMIN — GABAPENTIN 300 MG: 300 CAPSULE ORAL at 20:43

## 2023-03-09 RX ADMIN — ATORVASTATIN CALCIUM 80 MG: 80 TABLET, FILM COATED ORAL at 20:43

## 2023-03-09 RX ADMIN — Medication: at 15:03

## 2023-03-09 RX ADMIN — ROSUVASTATIN CALCIUM 40 MG: 20 TABLET, COATED ORAL at 20:43

## 2023-03-09 RX ADMIN — SODIUM CHLORIDE, PRESERVATIVE FREE 10 ML: 5 INJECTION INTRAVENOUS at 09:00

## 2023-03-09 RX ADMIN — ASPIRIN 81 MG: 81 TABLET ORAL at 09:17

## 2023-03-09 RX ADMIN — SENNOSIDES AND DOCUSATE SODIUM 2 TABLET: 8.6; 5 TABLET ORAL at 09:17

## 2023-03-09 RX ADMIN — FAMOTIDINE 20 MG: 20 TABLET ORAL at 09:17

## 2023-03-09 RX ADMIN — FAMOTIDINE 20 MG: 20 TABLET ORAL at 20:43

## 2023-03-09 RX ADMIN — SENNOSIDES AND DOCUSATE SODIUM 2 TABLET: 8.6; 5 TABLET ORAL at 20:43

## 2023-03-09 RX ADMIN — AMIODARONE HYDROCHLORIDE 200 MG: 200 TABLET ORAL at 09:17

## 2023-03-09 RX ADMIN — LEVOTHYROXINE SODIUM 100 MCG: 0.1 TABLET ORAL at 06:15

## 2023-03-09 RX ADMIN — FUROSEMIDE 40 MG: 40 TABLET ORAL at 06:15

## 2023-03-09 RX ADMIN — BISACODYL 10 MG: 5 TABLET, COATED ORAL at 12:38

## 2023-03-09 RX ADMIN — GUAIFENESIN 1200 MG: 600 TABLET ORAL at 09:17

## 2023-03-09 RX ADMIN — AMIODARONE HYDROCHLORIDE 200 MG: 200 TABLET ORAL at 20:43

## 2023-03-09 RX ADMIN — ONDANSETRON 4 MG: 4 TABLET, ORALLY DISINTEGRATING ORAL at 10:31

## 2023-03-09 NOTE — PLAN OF CARE
Problem: Discharge Planning  Goal: Discharge to home or other facility with appropriate resources  Outcome: Progressing  Flowsheets (Taken 3/9/2023 0700)  Discharge to home or other facility with appropriate resources:   Identify barriers to discharge with patient and caregiver   Arrange for needed discharge resources and transportation as appropriate   Identify discharge learning needs (meds, wound care, etc)   Refer to discharge planning if patient needs post-hospital services based on physician order or complex needs related to functional status, cognitive ability or social support system     Problem: Pain  Goal: Verbalizes/displays adequate comfort level or baseline comfort level  Outcome: Progressing     Problem: Safety - Adult  Goal: Free from fall injury  Outcome: Progressing     Problem: Respiratory - Adult  Goal: Achieves optimal ventilation and oxygenation  Outcome: Progressing

## 2023-03-09 NOTE — PROGRESS NOTES
ACUTE PHYSICAL THERAPY GOALS:   (Developed with and agreed upon by patient and/or caregiver.)   Ms. Aysha Milton will perform supine to sit and sit to supine independently with bed flat and no rails in 7 days   Ms. Aysha Milton will perform sit to stand and bed to chair independently in 7 days. Ms. Aysha Milton will perform gait >1000 ft independently in 7 days. Ms. Aysha Milton will perform therex x 25 reps in sitting and standing in 7 days. PHYSICAL THERAPY: Daily Note PM   (Link to Caseload Tracking: PT Visit Days : 3  Time In/Out PT Charge Capture  Rehab Caseload Tracker  Orders    Ny Comer is a 59 y.o. female   PRIMARY DIAGNOSIS: S/P CABG x 4  Chest pain [R07.9]  CAD, multiple vessel [I25.10]  Procedure(s) (LRB):  CORONARY ARTERY BYPASS GRAFT (CABG X 4), LIMA; ENDOSCOPIC VEIN HARVEST, LEFT GREATER SAPHENOUS VEIN (N/A)  TRANSESOPHAGEAL ECHOCARDIOGRAM (N/A)  3 Days Post-Op  Inpatient: Payor: /     ASSESSMENT:     REHAB RECOMMENDATIONS:   Recommendation to date pending progress:  Setting:  Home Health Therapy    Equipment:    To Be Determined     ASSESSMENT:  Ms. Aysha Milton is sitting in the recliner and agreeable to therapy. States that she's feeling nauseated. Sit to stand with supervision and ambulated into restroom with HHA. Began ambulating into hallway, however, patient endorsed severe nausea and lightheadedness. Returned to room and took BP sitting 93/54 and in standing 121/59. She is returned to the recliner and RN notified. Patient did demonstrate progress with sit to stand today. Gait limited by nausea. Continue PT efforts. PM Note:  Patient now on 2L O2. She stood with supervision and ambulated 250' with RW and SBA. Still nauseated, SpO2 95%. HR increased from 60's to 70's. Patient did demonstrate progress with gait this afternoon. SUBJECTIVE:   Ms. Aysha Milton states, \"Everything tastes awful. \"     Social/Functional Lives With: Significant other  Type of Home: House  Home Layout: One level  Home Access: Ramped entrance  [address] Help From: Family  Ambulation Assistance: Independent  Transfer Assistance: Independent  Mode of Transportation: Car  Occupation: Full time employment  Type of Occupation:  (stocking shelves)  OBJECTIVE:     PAIN: Delrae Popper / O2: PRECAUTION / Shlomo Matamorost / Gavino Saliva:   Pre Treatment:   Pain Assessment: None - Denies Pain      Post Treatment: 0/10 Vitals        Oxygen    Telemetry  and Prevena    RESTRICTIONS/PRECAUTIONS:        MOBILITY: I Mod I S SBA CGA Min Mod Max Total  NT x2 Comments:   Bed Mobility    Rolling [] [] [] [] [] [] [] [] [] [x] []    Supine to Sit [] [] [] [] [] [] [] [] [] [x] []    Scooting [] [] [] [] [] [] [] [] [] [x] []    Sit to Supine [] [] [] [] [] [] [] [] [] [x] []    Transfers    Sit to Stand [] [] [x] [] [] [] [] [] [] [] []    Bed to Chair [] [] [] [] [] [] [] [] [] [x] []    Stand to Sit [] [] [x] [] [] [] [] [] [] [] []     [] [] [] [] [] [] [] [] [] [] []    I=Independent, Mod I=Modified Independent, S=Supervision, SBA=Standby Assistance, CGA=Contact Guard Assistance,   Min=Minimal Assistance, Mod=Moderate Assistance, Max=Maximal Assistance, Total=Total Assistance, NT=Not Tested    BALANCE: Good Fair+ Fair Fair- Poor NT Comments   Sitting Static [x] [] [] [] [] []    Sitting Dynamic [x] [] [] [] [] []              Standing Static [] [] [x] [] [] []    Standing Dynamic [] [] [x] [] [] []      GAIT: I Mod I S SBA CGA Min Mod Max Total  NT x2 Comments:   Level of Assistance [] [] [] [] [x] [] [] [] [] [] []    Distance 250  feet    DME Gait Belt and Rolling Walker    Gait Quality Decreased gautam , Decreased step clearance, and Decreased step length    Weightbearing Status      Stairs      I=Independent, Mod I=Modified Independent, S=Supervision, SBA=Standby Assistance, CGA=Contact Guard Assistance,   Min=Minimal Assistance, Mod=Moderate Assistance, Max=Maximal Assistance, Total=Total Assistance, NT=Not Tested    PLAN:   FREQUENCY AND DURATION: BID for duration of hospital stay or until stated goals are met, whichever comes first.    TREATMENT:   TREATMENT:   Therapeutic Activity (28 Minutes): Therapeutic activity included Transfer Training, Ambulation on level ground, Sitting balance , Standing balance, and LE ex's to improve functional Activity tolerance, Balance, Mobility, and Strength.     TREATMENT GRID:     Date:  03/08/23 Date:  3/9/23 Date:     ACTIVITY/EXERCISE AM PM AM PM AM PM   LAQ 15 15  2x10 AB     Shoulder shrugs 15 15  2x10 AB     Gluteal sets 15 15       marching 15 15  2x10 AB     Ankle pumps 15 15  2x10 AB     abduction 15 15                B = bilateral; AA = active assistive; A = active; P = passive    AFTER TREATMENT PRECAUTIONS: Alarm Activated, Bed/Chair Locked, Call light within reach, Chair, Needs within reach, and RN notified    INTERDISCIPLINARY COLLABORATION:  RN/ PCT and PT/ PTA    EDUCATION:  review POC and importance of mobility in the recovery process    TIME IN/OUT:  Time In: 1350  Time Out: [address]  Minutes: [address], PT

## 2023-03-09 NOTE — PROGRESS NOTES
Cardiac Rehab: Spoke with patient regarding referral to cardiac rehab. Patient meets admission criteria based on CABG x4 (3/6/23). Written information about Cardiac Rehab given and reviewed with patient. Discussed lifestyle modifications to promote cardiac wellness. Patient indicated that she want to participate in the cardiac rehab program and her orientation has been scheduled. Her Cardiologist is Dr. Sj Gardner.       Thank you,  JAMAAL Molina, RN  Cardiopulmonary Rehabilitation Nurse Liaison  [facility]

## 2023-03-09 NOTE — PROGRESS NOTES
Today's Date: 3/9/2023  Date of Admission: 3/3/2023    Chart Reviewed. Subjective:     Patient feels ok. She states food does not taste like it should. She is doing poorly with IS. Medications Reviewed. Objective:     Vitals:    03/09/23 0330 03/09/23 0600 03/09/23 0621 03/09/23 0723   BP: (!) 94/50  (!) 93/55 (!) 88/52   Pulse: 63  64 65   Resp: 20   18   Temp: 98.5 °F (36.9 °C)   98.4 °F (36.9 °C)   TempSrc: Oral   Oral   SpO2: 97%   95%   Weight:  178 lb 9.6 oz (81 kg)     Height:           Intake and Output  Current Shift: No intake/output data recorded. Last 3 Shifts: 03/07 1901 - 03/09 0700  In: 1 [P.O.:480; I.V.:5]  Out: 3050 [Urine:3050]    Physical Exam:  General: Well Developed, Well Nourished, No Acute Distress, Alert & Oriented x 3, Appropriate mood  Neck: supple, no JVD  Heart: S1S2 with RRR without murmurs or gallops  Lungs: mostly clear throughout auscultation bilaterally, few crackles on left  Abd: soft, nontender, nondistended, with good bowel sounds  Ext: no edema bilaterally  Sternal incision: wound vac in place   Skin: warm and dry    LABS  Data Review:   Recent Labs     03/06/23  1233 03/06/23  1656 03/08/23  0546 03/09/23  0528      < > 135 137   K 3.8   < > 4.5 3.9   MG 3.3*   < > 2.5* 2.6*   BUN 14   < > 13 18   WBC 9.1   < > 8.3 8.5   HGB 11.2*   < > 10.0* 9.7*   HCT 33.8*   < > 32.5* 30.0*   *   < > 141* 145*   INR 1.1  --   --   --     < > = values in this interval not displayed. Estimated Creatinine Clearance: 81 mL/min (based on SCr of 0.8 mg/dL).       Assessment/Plan:     *S/P CABG x 4  On ASA, holding BB this morning for low BP, statin, currently stable on room air, pacing wires removed, continue PT     Active Hospital Problems    Chest pain  S/p CABG       Hypertension  BP low, holding meds       Elevated LFTs  Minimally elevated, follow on statin        CAD, multiple vessel  S/p CABG         Atherosclerotic heart disease of native coronary artery with unstable angina pectoris ([facility] [address]  S/p CABG       Familial hypercholesteremia  Continue statin, was not on treatment previously, may need Repatha if not at goal in 3 months       Cigarette smoker  Cessation encouraged        Hypothyroid  On synthroid     Dispo  Likely home with [place] in 1-2 days          Ancil Relic.  KERRI Weems

## 2023-03-09 NOTE — PROGRESS NOTES
SPEECH LANGUAGE PATHOLOGY: DYSPHAGIA  Initial Assessment and Discharge    NAME: Albino Apple  : 1960  MRN: [id number]    ADMISSION DATE: 3/3/2023  PRIMARY DIAGNOSIS: S/P CABG x 4  Chest pain [R07.9]  CAD, multiple vessel [I25.10]    ICD-10: Treatment Diagnosis: R13.12 Dysphagia, Oropharyngeal Phase    RECOMMENDATIONS   Diet:  Diet Solids Recommendation: Regular  Liquid Consistency Recommendation: Thin    Medications: PO     Other recommendations: ensure adequate oral hygiene       Patient continues to require skilled intervention: No  D/C Recommendations: No follow up therapy recommended post discharge       ASSESSMENT    Dysphagia Diagnosis: Swallow function appears [facility]  Patient presents with complaints of altered taste. Oropharyngeal swallow is functional, however she reports everything tastes weird/funny resulting in gagging episodes or will spit out food before attempting to swallow it. She tolerates serial sips of thin liquids without s/sx of aspiration, although she reports Hakeem water does not taste like [employer] and is requesting family bring in water from home. Per PA, taste changes suspected to be related to anesthesia. Patient is able to tolerate regular solids and thin liquids. Please re-consult if new concerns arise. GENERAL    History of Present Injury/Illness: Ms. Kendall Hanson  has a past medical history of Hyperlipidemia and Hypothyroid. . She also  has a past surgical history that includes gyn; Cholecystectomy; Cardiac procedure (N/A, 3/3/2023); Coronary artery bypass graft (N/A, 3/6/2023); and transesophageal echocardiogram (N/A, 3/6/2023). Subjective: upright in chair, alert and conversant. Communication Observation: Functional  Follows Directions: Complex    Prior Dysphagia History: none.   Patient Complaint: taste changes s/p CABG x4  Current Diet : Regular  Current Liquid Diet : Thin          O2 Device: Nasal cannula  Liters of Oxygen: 3 L     Pain:   Patient does not c/o pain, Patient does not appear in pain                                            OBJECTIVE        Oral Motor   Labial: No impairment  Oral Hygiene Comments: whitish lingual coating  Lingual: No impairment  Dentition: Adequate; Some missing teeth        Baseline Vocal Quality: Normal    Oropharyngeal Phase:      Patient consumed all trials (serial gulps of thin liquid via straw, 2 bites of pudding, 2 bites of mixed fruit, 4 bites of juani cracker) without overt s/sx aspiration or any signs of poor pharyngeal clearance. Patient did spit out portions of fully masticated mixed fruit(peaches) due to distaste before attempting to swallow bolus. She reports gagging and needing to spit out food that does not taste right (I.e. when given meds in puree). Voice remained clear. Single swallows palpated. PLAN    Duration/Frequency: No treatment indicated at this time    Dysphagia Outcome and Severity Scale (SPENCER)  Dysphagia Outcome Severity Scale: Level 6: Within functional limits/Modified independence  Interpretation of Tool: The Dysphagia Outcome and Severity Scale (SPENCER) is a simple, easy-to-use, 7-point scale developed to systematically rate the functional severity of dysphagia based on objective assessment and make recommendations for diet level, independence level, and type of nutrition.    Normal(7), Functional(6), Mild(5), Mild-Moderate(4), Moderate(3), Moderate-Severe(2), Severe(1)    Speech Therapy Prognosis  Prognosis: Excellent    Education: Patient, RN, cardiothoracic PA  Patient Education: diet consistencies, aspiration precautions, role of SLP  Patient Education Response: Verbalizes understanding, Demonstrated understanding    PRECAUTIONS/ALLERGIES: Pcn [penicillins]   Safety Devices in place: Yes  Type of devices: Left in chair;Nurse notified      Therapy Time  SLP Individual Minutes  Time In: 4388  Time Out: 8028  Minutes: 100 [facility], [facility] MEDICO DEL [facility], [facility] Sury RODRIGUEZ  3/9/2023 9:32 AM

## 2023-03-09 NOTE — PROGRESS NOTES
Pulmonary Daily Progresss  Note: 3/9/2023  Danielle Cain                                                     Admission Date: 3/3/2023     Hospital course. The patient's chart is reviewed and the patient is discussed with the staff. Patient is seen at the request of Destiny Bynum MD  for respiratory management status post cardiac surgery - CABG x 4  Patient had Chest pain. Currently is sedated in CV-ICU and orally intubated receiving  mechanical ventilation. We have been asked to see in the CV-ICU for mechanical ventilation management and weaning. Patient is a 59 y.o.  female seen and evaluated at the request of Dr. Bulmaro Teague prior to CABG. Patient has a PMH of HLD (has been intolerant to treatment with myalgias in past), fatty liver, and hypothyroid. She is a current smoker, smokes up to 1 ppd for 45 years. She says now she usually only smokes 4-5 total cigarettes. She presented to ED on 3/2 with chest pain/shoulder pain that started that day while she was at work. Cardiology admitted patient with plans for LHC. Troponins were negative. EKG showed SR with diffuse inverted T waves. LHC completed on 3/3 and showed multivessel disease with plans for CABG. She denies any history of COPD/emphysema. Does not use O2 at home. No inhaler or nebulizer use. States she doesn't like to take medications. Says she has some shortness of breath daily with congested cough occasionally. Seen immediately after her LHC and she is quite anxious and irritable. Wants to go smoke. Subjective:   Patient does not feel well today. States eating issues and having some nausea. Does not move bowels for 2-10 days at a time per here. Almost vomited today due to nausea. Nurse to get anti-emetic now. On RA now.      Current Outpatient Medications   Medication Instructions    levothyroxine (SYNTHROID) 100 mcg, Oral, DAILY      Review of Systems:   -Fever  -Headaches  -Chest pain  +Dyspnea, -wheezing,- cough  -Abdominal pain, -constipation +nausea  +Leg swelling  All other organ systems grossly normal.      Past Medical History:   Diagnosis Date    Hyperlipidemia     Hypothyroid      Past Surgical History:   Procedure Laterality Date    CARDIAC PROCEDURE N/A 3/3/2023    LEFT HEART CATH / CORONARY ANGIOGRAPHY performed by Stas Guerrero MD at [address] [place] [address] CATH LAB    CHOLECYSTECTOMY      CORONARY ARTERY BYPASS GRAFT N/A 3/6/2023    CORONARY ARTERY BYPASS GRAFT (CABG X 4), LIMA; ENDOSCOPIC VEIN HARVEST, LEFT GREATER SAPHENOUS VEIN performed by Sharon Rothman MD at [facility] MAIN OR    GYN      hysterectomy    TRANSESOPHAGEAL ECHOCARDIOGRAM N/A 3/6/2023    TRANSESOPHAGEAL ECHOCARDIOGRAM performed by Sharon Rothman MD at [facility] MAIN OR     Social History     Socioeconomic History    Marital status:      Spouse name: Not on file    Number of children: Not on file    Years of education: Not on file    Highest education level: Not on file   Occupational History    Not on file   Tobacco Use    Smoking status: Every Day     Packs/day: 1.00     Types: Cigarettes    Smokeless tobacco: Never   Substance and Sexual Activity    Alcohol use: No    Drug use: No    Sexual activity: Not on file   Other Topics Concern    Not on file   Social History Narrative    Not on file     Social Determinants of Health     Financial Resource Strain: Not on file   Food Insecurity: Not on file   Transportation Needs: Not on file   Physical Activity: Not on file   Stress: Not on file   Social Connections: Not on file   Intimate Partner Violence: Not on file   Housing Stability: Not on file     Family History   Problem Relation Age of Onset    No Known Problems Sister     No Known Problems Brother      Allergies   Allergen Reactions    Pcn [Penicillins] Nausea And Vomiting     Objective:   Blood pressure (!) 88/52, pulse 65, temperature 98.4 °F (36.9 °C), temperature source Oral, resp.  rate 18, height 5' 7.99\" (1.727 m), weight 178 lb 9.6 oz (81 kg), SpO2 95 %. Intake/Output Summary (Last 24 hours) at 3/9/2023 1017  Last data filed at 3/9/2023 0537  Gross per 24 hour   Intake 240 ml   Output 2150 ml   Net -1910 ml       Physical Exam:          Constitutional: Up in chair, awake and alert  EENMT:  Sclera clear, pupils equal, oral mucosa moist   Respiratory: decreased sounds in the bases. No wheezing  Cardiovascular:  RRR with no M,G,R;  Gastrointestinal:  soft; + bowel sounds present  Musculoskeletal:  warm with no cyanosis, no lower extremity edema. Bear site left leg with ace wrap. SKIN:  no jaundice or ecchymosis   Neurologic:   no gross neuro deficits  Psychiatric:  awake and alert. CXR:  CM with worse congestion and ?left pleural effusion. Atelectasis and low lung volumes OTW clear    ETT noted and normal post op changes            Recent Labs     03/06/23  1026 03/06/23  1044 03/06/23  1125 03/06/23  1225   PHAPOC 7.44 7.43 7.40 7.42   [id number] 38.3 39.0 37.1 36.8   [id number] 285* 260* 155* 193*   MMU3VBB 25.8 25.9 23.1 23.9   BE 1.6 1.5  --   --        Recent Labs     03/06/23  1233 03/06/23  1656 03/06/23  2107 03/07/23  0318 03/08/23  0546 03/09/23  0528   WBC 9.1 9.4  --  8.9 8.3 8.5   HGB 11.2* 10.7* 10.9* 10.2* 10.0* 9.7*   HCT 33.8* 33.5* 34.0* 32.2* 32.5* 30.0*   * 150  --  144* 141* 145*   INR 1.1  --   --   --   --   --        Recent Labs     03/07/23  0318 03/08/23  0546 03/09/23  0528    135 137   K 4.5 4.5 3.9   * 104 104   CO2 24 29 28   BUN 14 13 18   CREATININE 0.80 0.90 0.80   MG 2.5* 2.5* 2.6*       No results for input(s): PROCAL, LACTATE in the last 72 hours. 03/03/23    TRANSTHORACIC ECHOCARDIOGRAM (TTE) COMPLETE (CONTRAST/BUBBLE/3D PRN) 03/03/2023 12:03 PM, 03/03/2023 12:00 AM (Final)    Interpretation Summary    Left Ventricle: Normal left ventricular systolic function with a visually estimated EF of 60 - 65%. Left ventricle size is normal. Normal wall thickness.  Normal wall motion. Normal diastolic function. Tricuspid Valve: The estimated RVSP is 21 mmHg. Technical qualifiers: Color flow Doppler was performed and pulse wave and/or continuous wave Doppler was performed. Signed by: Vaishali Adams MD on 3/3/2023 12:03 PM, Signed by: Unknown Provider Result on 3/3/2023 12:00 AM    Assessment and Plan :  (Medical Decision Making)   Impression: 59 y.o. y/o female s/p CV surgery for S/P CABG x 4, extubated 03/06/23    Encounter for weaning from Ventilator:  --off oxygen now    Hypoxemia:   --off oxygen continue is    Atelectasis:   IS, pain control and mobilization    S/P Cardiovascular surgery:  S/p CABG x 4  --per CV surgery. Tobacco Abuse  --will need evaluation for COPD in the future and will make office visit for 3-4 weeks  --continue BD for now. CHF  --continue lasix  --meds per PMD  --did check with US and no effusion noted    Dysphagia  --seen by speech and f/u recommendation. More than 50% of the time documented was spent in face-to-face contact with the patient and in the care of the patient on the floor/unit where the patient is located.            Derek Flanagan MD

## 2023-03-09 NOTE — PROGRESS NOTES
ACUTE PHYSICAL THERAPY GOALS:   (Developed with and agreed upon by patient and/or caregiver.)   Ms. Feliz Scheuermann will perform supine to sit and sit to supine independently with bed flat and no rails in 7 days   Ms. Feliz Scheuermann will perform sit to stand and bed to chair independently in 7 days. Ms. Feliz Scheuermann will perform gait >1000 ft independently in 7 days. Ms. Feliz Scheuermann will perform therex x 25 reps in sitting and standing in 7 days. PHYSICAL THERAPY: Daily Note AM   (Link to Caseload Tracking: PT Visit Days : 3  Time In/Out PT Charge Capture  Rehab Caseload Tracker  Orders    Leighton Martinez is a 59 y.o. female   PRIMARY DIAGNOSIS: S/P CABG x 4  Chest pain [R07.9]  CAD, multiple vessel [I25.10]  Procedure(s) (LRB):  CORONARY ARTERY BYPASS GRAFT (CABG X 4), LIMA; ENDOSCOPIC VEIN HARVEST, LEFT GREATER SAPHENOUS VEIN (N/A)  TRANSESOPHAGEAL ECHOCARDIOGRAM (N/A)  3 Days Post-Op  Inpatient: Payor: /     ASSESSMENT:     REHAB RECOMMENDATIONS:   Recommendation to date pending progress:  Setting:  Home Health Therapy    Equipment:    To Be Determined     ASSESSMENT:  Ms. Feliz Scheuermann is sitting in the recliner and agreeable to therapy. States that she's feeling nauseated. Sit to stand with supervision and ambulated into restroom with HHA. Began ambulating into hallway, however, patient endorsed severe nausea and lightheadedness. Returned to room and took BP sitting 93/54 and in standing 121/59. She is returned to the recliner and RN notified. Patient did demonstrate progress with sit to stand today. Gait limited by nausea. Continue PT efforts.            SUBJECTIVE:   Ms. Feliz Scheuermann states, \"I need to go to the bathroom \"     Social/Functional Lives With: Significant other  Type of Home: House  Home Layout: One level  Home Access: Ramped entrance  [address] Help From: Family  Ambulation Assistance: Independent  Transfer Assistance: Independent  Mode of Transportation: Car  Occupation: Full time employment  Type of Occupation:  (stocking kina)  OBJECTIVE:     PAIN: Drena Bi / O2: PRECAUTION / Gabe Im / DRAINS:   Pre Treatment: 0/10         Post Treatment: /010 Vitals        Oxygen    Wound Vac and O2    RESTRICTIONS/PRECAUTIONS:        MOBILITY: I Mod I S SBA CGA Min Mod Max Total  NT x2 Comments:   Bed Mobility    Rolling [] [] [] [] [] [] [] [] [] [x] []    Supine to Sit [] [] [] [] [] [] [] [] [] [x] []    Scooting [] [] [] [] [] [] [] [] [] [x] []    Sit to Supine [] [] [] [] [] [] [] [] [] [x] []    Transfers    Sit to Stand [] [] [x] [] [] [] [] [] [] [] []    Bed to Chair [] [] [] [] [] [] [] [] [] [x] []    Stand to Sit [] [] [x] [] [] [] [] [] [] [] []     [] [] [] [] [] [] [] [] [] [] []    I=Independent, Mod I=Modified Independent, S=Supervision, SBA=Standby Assistance, CGA=Contact Guard Assistance,   Min=Minimal Assistance, Mod=Moderate Assistance, Max=Maximal Assistance, Total=Total Assistance, NT=Not Tested    BALANCE: Good Fair+ Fair Fair- Poor NT Comments   Sitting Static [x] [] [] [] [] []    Sitting Dynamic [x] [] [] [] [] []              Standing Static [] [] [x] [] [] []    Standing Dynamic [] [] [x] [] [] []      GAIT: I Mod I S SBA CGA Min Mod Max Total  NT x2 Comments:   Level of Assistance [] [] [] [] [x] [x] [] [] [] [] []    Distance 15, 25, 15  feet    DME Rolling Walker and HHA into bathroom    Gait Quality Decreased gautam , Decreased step clearance, and Decreased step length    Weightbearing Status      Stairs      I=Independent, Mod I=Modified Independent, S=Supervision, SBA=Standby Assistance, CGA=Contact Guard Assistance,   Min=Minimal Assistance, Mod=Moderate Assistance, Max=Maximal Assistance, Total=Total Assistance, NT=Not Tested    PLAN:   FREQUENCY AND DURATION: BID for duration of hospital stay or until stated goals are met, whichever comes first.    TREATMENT:   TREATMENT:   Therapeutic Activity (15 Minutes):  Therapeutic activity included Transfer Training, Ambulation on level ground, Sitting balance , and Standing balance to improve functional Activity tolerance, Balance, Mobility, and Strength.     TREATMENT GRID:     Date:  03/08/23 Date:   Date:     ACTIVITY/EXERCISE AM PM AM PM AM PM   LAQ 15 15       Shoulder shrugs 15 15       Gluteal sets 15 15       marching 15 15       Ankle pumps 15 15       abduction 15 15                B = bilateral; AA = active assistive; A = active; P = passive    AFTER TREATMENT PRECAUTIONS: Alarm Activated, Bed/Chair Locked, Call light within reach, Chair, Needs within reach, and RN notified    INTERDISCIPLINARY COLLABORATION:  RN/ PCT and PT/ PTA    EDUCATION:  review POC and importance of mobility in the recovery process    TIME IN/OUT:  Time In: 1008  Time Out: [address]  Minutes: 7[address] Lott PT

## 2023-03-09 NOTE — PLAN OF CARE
Problem: Discharge Planning  Goal: Discharge to home or other facility with appropriate resources  3/8/2023 2315 by Nikos Kulkarni RN  Outcome: Progressing  Flowsheets (Taken 3/8/2023 2040)  Discharge to home or other facility with appropriate resources:   Identify barriers to discharge with patient and caregiver   Arrange for needed discharge resources and transportation as appropriate   Identify discharge learning needs (meds, wound care, etc)  3/8/2023 1027 by Neeru Hargrove RN  Outcome: Progressing  Flowsheets (Taken 3/8/2023 0710)  Discharge to home or other facility with appropriate resources:   Identify barriers to discharge with patient and caregiver   Arrange for needed discharge resources and transportation as appropriate   Identify discharge learning needs (meds, wound care, etc)   Refer to discharge planning if patient needs post-hospital services based on physician order or complex needs related to functional status, cognitive ability or social support system     Problem: Pain  Goal: Verbalizes/displays adequate comfort level or baseline comfort level  3/8/2023 2315 by Nikos Kulkarni RN  Outcome: Progressing  3/8/2023 1027 by Neeru Hargrove RN  Outcome: Progressing     Problem: Safety - Adult  Goal: Free from fall injury  3/8/2023 2315 by Nikos Kulkarni RN  Outcome: Progressing  [address] (Taken 3/8/2023 2040)  Free From Fall Injury:   Instruct family/caregiver on patient safety   Based on caregiver fall risk screen, instruct family/caregiver to ask for assistance with transferring infant if caregiver noted to have fall risk factors  3/8/2023 1027 by Neeru Hargrove RN  Outcome: Progressing     Problem: Respiratory - Adult  Goal: Achieves optimal ventilation and oxygenation  3/8/2023 2315 by Nikos Kulkarni RN  Outcome: Progressing  Flowsheets (Taken 3/8/2023 2040)  Achieves optimal ventilation and oxygenation:   Assess for changes in respiratory status   Assess for changes in mentation and behavior   Position to facilitate oxygenation and minimize respiratory effort   Oxygen supplementation based on oxygen saturation or arterial blood gases   Assess and instruct to report shortness of breath or any respiratory difficulty  3/8/2023 1324 by Marianne Garland RCP  Outcome: Progressing  Flowsheets (Taken 3/8/2023 1324)  Achieves optimal ventilation and oxygenation:   Assess for changes in respiratory status   Assess for changes in mentation and behavior   Oxygen supplementation based on oxygen saturation or arterial blood gases   Encourage broncho-pulmonary hygiene including cough, deep breathe, incentive spirometry   Assess and instruct to report shortness of breath or any respiratory difficulty   Respiratory therapy support as indicated     Problem: Skin/Tissue Integrity  Goal: Absence of new skin breakdown  Description: 1. Monitor for areas of redness and/or skin breakdown  2. Assess vascular access sites hourly  3. Every 4-6 hours minimum:  Change oxygen saturation probe site  4. Every 4-6 hours:  If on nasal continuous positive airway pressure, respiratory therapy assess nares and determine need for appliance change or resting period.   Outcome: Progressing

## 2023-03-10 VITALS
WEIGHT: 176 LBS | SYSTOLIC BLOOD PRESSURE: 86 MMHG | HEIGHT: 68 IN | TEMPERATURE: 98.3 F | BODY MASS INDEX: 26.67 KG/M2 | RESPIRATION RATE: 16 BRPM | OXYGEN SATURATION: 97 % | HEART RATE: 63 BPM | DIASTOLIC BLOOD PRESSURE: 53 MMHG

## 2023-03-10 LAB
GLUCOSE BLD STRIP.AUTO-MCNC: 106 MG/DL (ref 65–100)
GLUCOSE BLD STRIP.AUTO-MCNC: 198 MG/DL (ref 65–100)
MAGNESIUM SERPL-MCNC: 2.6 MG/DL (ref 1.8–2.4)
POTASSIUM SERPL-SCNC: 3.5 MMOL/L (ref 3.5–5.1)
SERVICE CMNT-IMP: ABNORMAL
SERVICE CMNT-IMP: ABNORMAL

## 2023-03-10 PROCEDURE — 6370000000 HC RX 637 (ALT 250 FOR IP): Performed by: THORACIC SURGERY (CARDIOTHORACIC VASCULAR SURGERY)

## 2023-03-10 PROCEDURE — 97530 THERAPEUTIC ACTIVITIES: CPT

## 2023-03-10 PROCEDURE — 2580000003 HC RX 258: Performed by: THORACIC SURGERY (CARDIOTHORACIC VASCULAR SURGERY)

## 2023-03-10 PROCEDURE — 83735 ASSAY OF MAGNESIUM: CPT

## 2023-03-10 PROCEDURE — 36415 COLL VENOUS BLD VENIPUNCTURE: CPT

## 2023-03-10 PROCEDURE — 82962 GLUCOSE BLOOD TEST: CPT

## 2023-03-10 PROCEDURE — 84132 ASSAY OF SERUM POTASSIUM: CPT

## 2023-03-10 PROCEDURE — 6370000000 HC RX 637 (ALT 250 FOR IP): Performed by: PHYSICIAN ASSISTANT

## 2023-03-10 PROCEDURE — 99024 POSTOP FOLLOW-UP VISIT: CPT | Performed by: PHYSICIAN ASSISTANT

## 2023-03-10 RX ORDER — ROSUVASTATIN CALCIUM 40 MG/1
40 TABLET, COATED ORAL NIGHTLY
Qty: 90 TABLET | Refills: 3 | Status: SHIPPED | OUTPATIENT
Start: 2023-03-10

## 2023-03-10 RX ORDER — ACETAMINOPHEN 325 MG/1
650 TABLET ORAL EVERY 6 HOURS PRN
Qty: 120 TABLET | Refills: 3 | COMMUNITY
Start: 2023-03-10

## 2023-03-10 RX ORDER — GABAPENTIN 300 MG/1
300 CAPSULE ORAL 3 TIMES DAILY
Qty: 90 CAPSULE | Refills: 0 | Status: SHIPPED | OUTPATIENT
Start: 2023-03-10 | End: 2023-04-09

## 2023-03-10 RX ORDER — ASPIRIN 81 MG/1
81 TABLET ORAL DAILY
Qty: 30 TABLET | Refills: 3 | COMMUNITY
Start: 2023-03-11

## 2023-03-10 RX ADMIN — FUROSEMIDE 40 MG: 40 TABLET ORAL at 06:35

## 2023-03-10 RX ADMIN — GABAPENTIN 300 MG: 300 CAPSULE ORAL at 08:49

## 2023-03-10 RX ADMIN — FAMOTIDINE 20 MG: 20 TABLET ORAL at 08:49

## 2023-03-10 RX ADMIN — POTASSIUM CHLORIDE 20 MEQ: 1500 TABLET, EXTENDED RELEASE ORAL at 08:49

## 2023-03-10 RX ADMIN — Medication 1 AMPULE: at 08:50

## 2023-03-10 RX ADMIN — GUAIFENESIN 1200 MG: 600 TABLET ORAL at 08:49

## 2023-03-10 RX ADMIN — LEVOTHYROXINE SODIUM 100 MCG: 0.1 TABLET ORAL at 06:35

## 2023-03-10 RX ADMIN — SODIUM CHLORIDE, PRESERVATIVE FREE 10 ML: 5 INJECTION INTRAVENOUS at 08:50

## 2023-03-10 RX ADMIN — AMIODARONE HYDROCHLORIDE 200 MG: 200 TABLET ORAL at 08:49

## 2023-03-10 RX ADMIN — SENNOSIDES AND DOCUSATE SODIUM 2 TABLET: 8.6; 5 TABLET ORAL at 08:49

## 2023-03-10 RX ADMIN — POTASSIUM CHLORIDE 10 MEQ: 750 TABLET, EXTENDED RELEASE ORAL at 08:49

## 2023-03-10 RX ADMIN — ASPIRIN 81 MG: 81 TABLET ORAL at 08:49

## 2023-03-10 NOTE — PLAN OF CARE
Problem: Discharge Planning  Goal: Discharge to home or other facility with appropriate resources  3/10/2023 0955 by Mendel Moos, RN  Outcome: Completed  Flowsheets (Taken 3/10/2023 0845)  Discharge to home or other facility with appropriate resources: Identify barriers to discharge with patient and caregiver  3/9/2023 2327 by Srikanth Galloway RN  Outcome: Progressing  Flowsheets (Taken 3/9/2023 1957)  Discharge to home or other facility with appropriate resources:   Identify barriers to discharge with patient and caregiver   Arrange for needed discharge resources and transportation as appropriate   Identify discharge learning needs (meds, wound care, etc)     Problem: Pain  Goal: Verbalizes/displays adequate comfort level or baseline comfort level  3/10/2023 0955 by Mendel Moos, RN  Outcome: Completed  Flowsheets (Taken 3/10/2023 0845)  Verbalizes/displays adequate comfort level or baseline comfort level: Encourage patient to monitor pain and request assistance  3/9/2023 2327 by Srikanth Galloway RN  Outcome: Progressing     Problem: Safety - Adult  Goal: Free from fall injury  3/10/2023 0955 by Mendel Moos, RN  Outcome: Completed  Flowsheets (Taken 3/10/2023 0954)  Free From Fall Injury: Instruct family/caregiver on patient safety  3/9/2023 2327 by Srikanth Galloway RN  Outcome: Progressing  Flowsheets (Taken 3/9/2023 1957)  Free From Fall Injury:   Instruct family/caregiver on patient safety   Based on caregiver fall risk screen, instruct family/caregiver to ask for assistance with transferring infant if caregiver noted to have fall risk factors     Problem: Respiratory - Adult  Goal: Achieves optimal ventilation and oxygenation  3/10/2023 0955 by Mendel Moos, RN  Outcome: Completed  Flowsheets (Taken 3/10/2023 0845)  Achieves optimal ventilation and oxygenation: Assess for changes in respiratory status  3/9/2023 2327 by Srikanth Galloway RN  Outcome: Progressing     Problem: Skin/Tissue Integrity  Goal: Absence of new skin breakdown  Description: 1. Monitor for areas of redness and/or skin breakdown  2. Assess vascular access sites hourly  3. Every 4-6 hours minimum:  Change oxygen saturation probe site  4. Every 4-6 hours:  If on nasal continuous positive airway pressure, respiratory therapy assess nares and determine need for appliance change or resting period.   3/10/2023 0955 by Vandana London RN  Outcome: Completed  3/9/2023 0483 by Ruma Colmenares RN  Outcome: Progressing

## 2023-03-10 NOTE — PROGRESS NOTES
ACUTE PHYSICAL THERAPY GOALS:   (Developed with and agreed upon by patient and/or caregiver.)   Ms. Meeta Edward will perform supine to sit and sit to supine independently with bed flat and no rails in 7 days   Ms. Meeta Edward will perform sit to stand and bed to chair independently in 7 days. Ms. Meeta Edward will perform gait >1000 ft independently in 7 days. Ms. Meeta Edward will perform therex x 25 reps in sitting and standing in 7 days. PHYSICAL THERAPY: Daily Note AM   (Link to Caseload Tracking: PT Visit Days : 3  Time In/Out PT Charge Capture  Rehab Caseload Tracker  Orders    Poonam Rangel is a 59 y.o. female   PRIMARY DIAGNOSIS: S/P CABG x 4  Chest pain [R07.9]  CAD, multiple vessel [I25.10]  Procedure(s) (LRB):  CORONARY ARTERY BYPASS GRAFT (CABG X 4), LIMA; ENDOSCOPIC VEIN HARVEST, LEFT GREATER SAPHENOUS VEIN (N/A)  TRANSESOPHAGEAL ECHOCARDIOGRAM (N/A)  4 Days Post-Op  Inpatient: Payor: [employer] / Plan: [employer]IC [employer]ERCIAL / Product Type: Indemnity /     ASSESSMENT:     REHAB RECOMMENDATIONS:   Recommendation to date pending progress:  Setting:  Home Health Therapy    Equipment:    To Be Determined     ASSESSMENT:  Ms. Meeta Edward is sitting in the recliner and agreeable to therapy. Sit to stand with supervision and ambulated and needed to go to the bathroom first.  She is independent with this and washed her hands. Gait training with rolling walker x 400 feet with good gautam, as the patient is also visiting with other patients, staff and dancing. On RA and saturations 91-93%   She is returned to the recliner and left with needs within reach. Progress demonstrated. Continue PT efforts. Discharging home today.          SUBJECTIVE:   Ms. Meeta Edward states, \"I need to go to the bathroom \"     Social/Functional Lives With: Significant other  Type of Home: House  Home Layout: One level  Home Access: Ramped entrance  [address] Help From: Family  Ambulation Assistance: Independent  Transfer Assistance: Independent  Mode of Transportation: Car  Occupation: Full time employment  Type of Occupation:  (stocking shelves)  OBJECTIVE:     PAIN: Gera Mungo / O2: PRECAUTION / George Valentine / Ilda Prabhakar:   Pre Treatment: 0/10         Post Treatment: /010 Vitals        Oxygen    Telemetry     RESTRICTIONS/PRECAUTIONS:        MOBILITY: I Mod I S SBA CGA Min Mod Max Total  NT x2 Comments:   Bed Mobility    Rolling [] [] [] [] [] [] [] [] [] [x] []    Supine to Sit [] [] [] [] [] [] [] [] [] [x] []    Scooting [] [] [] [] [] [] [] [] [] [x] []    Sit to Supine [] [] [] [] [] [] [] [] [] [x] []    Transfers    Sit to Stand [] [] [x] [] [] [] [] [] [] [] []    Bed to Chair [] [] [] [] [] [] [] [] [] [x] []    Stand to Sit [] [] [x] [] [] [] [] [] [] [] []     [] [] [] [] [] [] [] [] [] [] []    I=Independent, Mod I=Modified Independent, S=Supervision, SBA=Standby Assistance, CGA=Contact Guard Assistance,   Min=Minimal Assistance, Mod=Moderate Assistance, Max=Maximal Assistance, Total=Total Assistance, NT=Not Tested    BALANCE: Good Fair+ Fair Fair- Poor NT Comments   Sitting Static [x] [] [] [] [] []    Sitting Dynamic [x] [] [] [] [] []              Standing Static [] [] [x] [] [] []    Standing Dynamic [] [] [x] [] [] []      GAIT: I Mod I S SBA CGA Min Mod Max Total  NT x2 Comments:   Level of Assistance [] [] [] [x] [] [] [] [] [] [] []    Distance  400 feet    DME Rolling Walker and HHA into bathroom    Gait Quality Decreased gautam , Decreased step clearance, and Decreased step length    Weightbearing Status      Stairs      I=Independent, Mod I=Modified Independent, S=Supervision, SBA=Standby Assistance, CGA=Contact Guard Assistance,   Min=Minimal Assistance, Mod=Moderate Assistance, Max=Maximal Assistance, Total=Total Assistance, NT=Not Tested    PLAN:   FREQUENCY AND DURATION: BID for duration of hospital stay or until stated goals are met, whichever comes first.    TREATMENT:   TREATMENT:   Therapeutic Activity (25 Minutes):  Therapeutic activity included Transfer Training, Ambulation on level ground, Sitting balance , and Standing balance to improve functional Activity tolerance, Balance, Mobility, and Strength.     TREATMENT GRID:     Date:  03/08/23 Date:   Date:     ACTIVITY/EXERCISE AM PM AM PM AM PM   LAQ 15 15       Shoulder shrugs 15 15       Gluteal sets 15 15       marching 15 15       Ankle pumps 15 15       abduction 15 15                B = bilateral; AA = active assistive; A = active; P = passive    AFTER TREATMENT PRECAUTIONS: Alarm Activated, Bed/Chair Locked, Call light within reach, Chair, Needs within reach, and RN notified    INTERDISCIPLINARY COLLABORATION:  RN/ PCT and PT/ PTA    EDUCATION:  review POC and importance of mobility in the recovery process    TIME IN/OUT:  Time In: 0915  Time Out: 0940  Minutes: 25    Scarlet Mckeon, PTA

## 2023-03-10 NOTE — DISCHARGE INSTRUCTIONS
The patient is being discharged home with home health services in stable condition on a low saturated fat, low cholesterol and low salt diet. The patient is instructed to advance activities as tolerated to the limit of fatigue or shortness of breath. The patient is instructed to avoid all heavy lifting or activities that strain the chest or upper arm muscles. Strenuous activity should be avoided. The patient is instructed to watch for signs of infection which include: increasing area of redness, fever or purulent drainage at the incision site. The patient is instructed to call the office or return to the ER for immediate evaluation for any shortness of breath or chest pain not relieved by NTG.

## 2023-03-10 NOTE — PLAN OF CARE
Problem: Discharge Planning  Goal: Discharge to home or other facility with appropriate resources  3/9/2023 2327 by Aiden Perez RN  Outcome: Progressing  Flowsheets (Taken 3/9/2023 1957)  Discharge to home or other facility with appropriate resources:   Identify barriers to discharge with patient and caregiver   Arrange for needed discharge resources and transportation as appropriate   Identify discharge learning needs (meds, wound care, etc)  3/9/2023 1839 by Nick Christina RN  Outcome: Progressing  Flowsheets (Taken 3/9/2023 0700)  Discharge to home or other facility with appropriate resources:   Identify barriers to discharge with patient and caregiver   Arrange for needed discharge resources and transportation as appropriate   Identify discharge learning needs (meds, wound care, etc)   Refer to discharge planning if patient needs post-hospital services based on physician order or complex needs related to functional status, cognitive ability or social support system     Problem: Pain  Goal: Verbalizes/displays adequate comfort level or baseline comfort level  3/9/2023 2327 by Aiden Perez RN  Outcome: Progressing  3/9/2023 1839 by Nick Christina RN  Outcome: Progressing     Problem: Safety - Adult  Goal: Free from fall injury  3/9/2023 2327 by Aiden Perez RN  Outcome: Progressing  Flowsheets (Taken 3/9/2023 1957)  Free From Fall Injury:   Instruct family/caregiver on patient safety   Based on caregiver fall risk screen, instruct family/caregiver to ask for assistance with transferring infant if caregiver noted to have fall risk factors  3/9/2023 1839 by Nick Christina RN  Outcome: Progressing     Problem: Respiratory - Adult  Goal: Achieves optimal ventilation and oxygenation  3/9/2023 2327 by Aiden Perez RN  Outcome: Progressing  3/9/2023 1839 by Nick Christina RN  Outcome: Progressing     Problem: Skin/Tissue Integrity  Goal: Absence of new skin breakdown  Description: 1.   Monitor for areas of redness and/or skin breakdown  2. Assess vascular access sites hourly  3. Every 4-6 hours minimum:  Change oxygen saturation probe site  4. Every 4-6 hours:  If on nasal continuous positive airway pressure, respiratory therapy assess nares and determine need for appliance change or resting period.   Outcome: Progressing

## 2023-03-10 NOTE — DISCHARGE SUMMARY
Discharge Summary     Patient ID:  Esther Putnam  [id number]  51 y.o.  1960    Admit date: 3/3/2023    Discharge date:  3/10/2023    Admitting Physician: Mary Ann Pinzon MD     Discharge Physician: BARBIE Strong/Dr. Fiordaliza Tipton    Admission Diagnoses: Chest pain [R07.9]  CAD, multiple vessel [I25.10]    Discharge Diagnoses:   Patient Active Problem List    Diagnosis Date Noted    Chest pain 03/03/2023    Hypertension 03/03/2023    Elevated LFTs 03/03/2023    Encounter [facility] ([facility] 03/08/2023    Agitation 03/08/2023    Tobacco abuse 03/08/2023    Pleural effusion on left 03/08/2023    CAD, multiple vessel 03/06/2023    S/P CABG x 4 03/06/2023    Atherosclerotic heart disease of native coronary artery with unstable angina pectoris ([facility] 03/03/2023    Familial hypercholesteremia 09/16/2019    Cigarette smoker 06/20/2019    Hypothyroid        Procedures this admission:  Diagnostic left heart catheterization  EchoCardiogram  Coronary Artery Bypass Graft Surgery   Consults: [facility] DR OSMANI CASTREJON Course: Patient is a 59 y.o. female with past medical history of tobacco abuse, dyslipidemia and hypothyroidism who presented to the ER at [facility] with complaints of chest pain. Patient reported that her symptoms started around 2pm while at work. She described her pain as pressure/heartburn-like that goes across her chest with radiation into her left arm, back and neck. Troponin was negative. She was transferred to [facility]. [facility] was planned. She underwent cardiac catheterization that showed severe multivessel disease with occluded small caliber RCA. Echocardiogram showed a normal LV EF with no significant valvular disease. CABG surgery was planned. She underwent CABG x 4 with LIMA to the LAD, reverse SVG to the diagonal, reverse SVG to the OM1 and reverse SVG to the OM3 on 3/6/23. She did well post operatively and was transferred to [facility] on POD 1.  She was hypotensive and did not tolerate a BB post op. She progressed well with PT. She was gradually weaned off of O2. She remained in sinus rhythm. The morning of 3/10/23, patient was feeling well and ambulating without any symptoms. Patient was determined stable and ready for discharge. Patient was instructed on the importance of medication compliance and outpatient follow up. For maximized medical therapy for CAD, patient will continue ASA and statin as well. She is not on a BB or ACE-I/ARB due to low BP. The patient will have close transitional care follow up [facility] Dr. Kandice Flores on March 21st [facility] 11:45am [facility] The patient will follow up with Dr. Victoria Bañuelos on March 29th at 2:20pm and has been referred to cardiac rehab. DISPOSITION: The patient is being discharged home with home health services in stable condition on a low saturated fat, low cholesterol and low salt diet. The patient is instructed to advance activities as tolerated to the limit of fatigue or shortness of breath. The patient is instructed to avoid all heavy lifting or activities that strain the chest or upper arm muscles. Strenuous activity should be avoided. The patient is instructed to watch for signs of infection which include: increasing area of redness, fever or purulent drainage at the incision site. The patient is instructed to call the office or return to the ER for immediate evaluation for any shortness of breath or chest pain not relieved by NTG.     Discharge Exam: /57   Pulse 63   Temp 98.3 °F (36.8 °C) (Oral)   Resp 16   Ht 5' 7.99\" (1.727 m)   Wt 176 lb (79.8 kg)   SpO2 97%   BMI 26.77 kg/m²       Physical Exam:  General: Well Developed, Well Nourished, No Acute Distress, Alert & Oriented  Neck: supple, no JVD  Heart: S1S2 with RRR without murmurs or gallops  Lungs: Clear throughout auscultation bilaterally without adventitious sounds  Abd: soft, nontender, nondistended, with good bowel sounds  Ext: warm, no edema  Sternal incision: clean, dry, and intact  Skin: warm and dry      Recent Results (from the past 24 hour(s))   Magnesium    Collection Time: 03/10/23  6:00 AM   Result Value Ref Range    Magnesium 2.6 (H) 1.8 - 2.4 mg/dL   Potassium    Collection Time: 03/10/23  6:00 AM   Result Value Ref Range    Potassium 3.5 3.5 - 5.1 mmol/L   POCT Glucose    Collection Time: 03/10/23  6:02 AM   Result Value Ref Range    POC Glucose 198 (H) 65 - 100 mg/dL    Performed by: Carmella    POCT Glucose    Collection Time: 03/10/23  6:37 AM   Result Value Ref Range    POC Glucose 106 (H) 65 - 100 mg/dL    Performed by: Mary Holt          Patient Instructions:      Medication List        START taking these medications      acetaminophen 325 MG tablet  Commonly known as: TYLENOL  Take 2 tablets by mouth every 6 hours as needed for Pain     aspirin 81 MG EC tablet  Take 1 tablet by mouth daily  Start taking on: March 11, 2023     gabapentin 300 MG capsule  Commonly known as: NEURONTIN  Take 1 capsule by mouth 3 times daily for 30 days. rosuvastatin 40 MG tablet  Commonly known as: CRESTOR  Take 1 tablet by mouth nightly            CONTINUE taking these medications      levothyroxine 100 MCG tablet  Commonly known as: SYNTHROID               Where to Get Your Medications        These medications were sent to [address] [place] [address] Dr Mariam Laird [phone] - [address], [address] [place] [address] [address] [id number]      Phone: [phone]   gabapentin 300 MG capsule  rosuvastatin 40 MG tablet       Information about where to get these medications is not yet available    Ask your nurse or doctor about these medications  acetaminophen 325 MG tablet  aspirin 81 MG EC tablet            Signed:  Cadence Nova.  KERRI Weems  3/10/2023  8:54 AM

## 2023-03-10 NOTE — PROGRESS NOTES
Discharge instructions, follow up appointments and prescriptions reviewed with patient and family. Both verbalize understanding. All personal belongings taken with patient. Family member will drive patient home. Patient escorted to discharge area via wheelchair. Patient is stable at discharge.         Suture removed

## 2023-03-10 NOTE — CARE COORDINATION
Pt with discharge orders this day. Pt to return home with spouse. This CM met with pt at bedside this day and she confirms that she does in fact of insurance. CM looked at pt chart and insurance card was scanned to her chart on 3/3/23. This CM printed out copy and brought to admitting to add insurance to pt chart. Reviewed home health with at pt and she is agreeable to referral.  Reviewed agencies - referral made to [facility]. No additional CM needs at discharge. 03/03/23 2950   Service Assessment   Patient Orientation Alert and Oriented   Cognition Alert   History Provided By Patient   Primary Caregiver Self   Support Systems Spouse/Significant Other   Patient's Healthcare Decision Maker is: Legal Next of Kin   PCP Verified by CM Yes  (Pt does not have one)   Prior Functional Level Independent in ADLs/IADLs   Current Functional Level Other (see comment)  (TBD by clinical team)   Can patient return to prior living arrangement Yes   Ability to make needs known: Good   Family able to assist with home care needs: Yes   Would you like for me to discuss the discharge plan with any other family members/significant others, and if so, who? No   Financial Resources Other (Comment)  (Commercial)   Social/Functional History   Receives Help From Family   Mode of Transportation Car   Discharge [address]   DME Ordered? No   Potential Assistance Purchasing Medications No   Type of Home Care Services PT;Nursing Services   Services At/After Discharge   Transition of Care Consult (CM Consult) Discharge Planning   Services [address] Provided?  No   Mode of Transport at Discharge   (Car)   Confirm Follow Up Transport Family   Condition of Participation: Discharge Planning   The Plan for Transition of Care is related to the following treatment goals: Home with spouse   The Patient and/or Patient Representative was provided with a Choice of Provider? Patient   The Patient and/Or Patient Representative agree with the Discharge Plan? Yes   Freedom of Choice list was provided with basic dialogue that supports the patient's individualized plan of care/goals, treatment preferences, and shares the quality data associated with the providers?   Yes

## 2023-03-13 ENCOUNTER — TELEPHONE (OUTPATIENT)
Dept: CARDIOLOGY CLINIC | Age: 63
End: 2023-03-13

## 2023-03-13 NOTE — TELEPHONE ENCOUNTER
Shae Salcedo has been contacted regarding recent hospital admission. Current medications were reviewed with the patient/caregiver by telephone. Date of Admission: 3/3/23     Date of Discharge: 3/10/23     Facility patient was discharged from: [facility]     Reason for Admission: CABG    Any medication changes? yes     How is the patient feeling? Feeling good     Does the patient have any questions or problems? no   Does the patient need transportation? no     Follow-up Appointment date: 3/21/23       I advised the patient to bring her medications in the original bottles and to contact office, or go to either urgent care or emergency care if symptoms return before scheduled appointment.     Vijaya Roland RN 3/13/2023 9:05 AM Unknown if ever smoked

## 2023-03-20 NOTE — PATIENT INSTRUCTIONS
information. Patient Education        Quitting Tobacco: Care Instructions  Quitting tobacco is much harder than simply changing a habit. Nicotine cravings make it hard to quit, but you can do it. Your doctor will help you set up the plan that best meets your needs. You will miss the nicotine at first. You may feel short-tempered and grumpy. You may have trouble sleeping or thinking clearly. The urge to use tobacco may continue for a time. Combining tools can raise your chances of success. You can use medicine along with counseling. And you can join a quit-tobacco program, such as the American Lung Association's Freedom from Smoking program.     Get support. Reach out to family and friends, and find a counselor to help you quit. Join a support group, such as Nicotine Anonymous. Go to www.smokefree. gov to sign up for text messaging support. Talk to your doctor or pharmacist about medicines that can help you quit. Medicines can help with cravings and withdrawal symptoms. There are several over-the-counter choices, such as nicotine patches, gum, and lozenges. After you quit, do not use tobacco again, not even once. Get rid of all tobacco products and anything that reminds you of tobacco, such as ashtrays. Avoid things that make you reach for tobacco.  Change your daily routine. Take a different route to work, or eat a meal in a different place. Try to cut down on stress. Find ways to calm yourself, such as taking a hot bath or doing deep breathing exercises. Eat a healthy diet, and get regular exercise. Having healthy habits may help you quit using tobacco.     Don't give up on quitting if you use tobacco again. Most people quit and restart a few times before they quit for good. Follow-up care is a key part of your treatment and safety. Be sure to make and go to all appointments, and call your doctor if you are having problems.  It's also a good idea to know your test results and keep a

## 2023-03-20 NOTE — PROGRESS NOTES
then and will then have to add either PCSK9i therapy or more likely bempedoic acid which recently was shown to reduce clinical events, or Leqvio.     Discussed health risks and financial burden of ongoing tobacco use.  Strongly encouraged cessation and referred to multiple resources for assistance, including state and health system sponsored resources, as well as free online resources including www.cardiosmart.org. Discussed preparing to do the work of quitting, including picking a quit date, keeping a journal, looking into online support groups, counseling, hypnosis, etc.  Patient may contact me or primary care if additional help is felt necessary.         Return in about 3 months (around 6/21/2023) for LABS BEFORE VISIT.          Thank you for allowing me to participate in this patient's care.  Please call or contact me if there are any questions or concerns regarding the above.      RAJEEV BUSH MD  03/21/23  12:28 PM

## 2023-03-21 ENCOUNTER — OFFICE VISIT (OUTPATIENT)
Dept: CARDIOLOGY CLINIC | Age: 63
End: 2023-03-21

## 2023-03-21 VITALS
BODY MASS INDEX: 27.15 KG/M2 | HEIGHT: 67 IN | SYSTOLIC BLOOD PRESSURE: 110 MMHG | HEART RATE: 64 BPM | WEIGHT: 173 LBS | DIASTOLIC BLOOD PRESSURE: 62 MMHG

## 2023-03-21 DIAGNOSIS — I25.708 CORONARY ARTERY DISEASE OF BYPASS GRAFT OF NATIVE HEART WITH STABLE ANGINA PECTORIS (HCC): ICD-10-CM

## 2023-03-21 DIAGNOSIS — F17.210 CIGARETTE SMOKER: ICD-10-CM

## 2023-03-21 DIAGNOSIS — Z95.1 S/P CABG X 4: Primary | ICD-10-CM

## 2023-03-21 DIAGNOSIS — E78.5 DYSLIPIDEMIA: ICD-10-CM

## 2023-03-21 SDOH — HEALTH STABILITY: PHYSICAL HEALTH: ON AVERAGE, HOW MANY DAYS PER WEEK DO YOU ENGAGE IN MODERATE TO STRENUOUS EXERCISE (LIKE A BRISK WALK)?: 5 DAYS

## 2023-03-21 SDOH — HEALTH STABILITY: PHYSICAL HEALTH: ON AVERAGE, HOW MANY MINUTES DO YOU ENGAGE IN EXERCISE AT THIS LEVEL?: 150+ MIN

## 2023-03-21 ASSESSMENT — ENCOUNTER SYMPTOMS: SHORTNESS OF BREATH: 0

## 2023-03-22 ENCOUNTER — OFFICE VISIT (OUTPATIENT)
Dept: INTERNAL MEDICINE CLINIC | Facility: CLINIC | Age: 63
End: 2023-03-22
Payer: COMMERCIAL

## 2023-03-22 VITALS
BODY MASS INDEX: 26.84 KG/M2 | OXYGEN SATURATION: 98 % | DIASTOLIC BLOOD PRESSURE: 60 MMHG | SYSTOLIC BLOOD PRESSURE: 118 MMHG | HEIGHT: 67 IN | HEART RATE: 73 BPM | WEIGHT: 171 LBS

## 2023-03-22 DIAGNOSIS — E03.9 HYPOTHYROIDISM, UNSPECIFIED TYPE: Primary | ICD-10-CM

## 2023-03-22 DIAGNOSIS — D64.9 ANEMIA, UNSPECIFIED TYPE: ICD-10-CM

## 2023-03-22 DIAGNOSIS — R73.03 PREDIABETES: ICD-10-CM

## 2023-03-22 DIAGNOSIS — F17.210 CIGARETTE SMOKER: ICD-10-CM

## 2023-03-22 DIAGNOSIS — E03.9 HYPOTHYROIDISM, UNSPECIFIED TYPE: ICD-10-CM

## 2023-03-22 DIAGNOSIS — R74.8 ELEVATED LIVER ENZYMES: ICD-10-CM

## 2023-03-22 DIAGNOSIS — I25.110 ATHEROSCLEROSIS OF NATIVE CORONARY ARTERY WITH UNSTABLE ANGINA PECTORIS, UNSPECIFIED WHETHER NATIVE OR TRANSPLANTED HEART (HCC): ICD-10-CM

## 2023-03-22 DIAGNOSIS — K76.0 FATTY LIVER: ICD-10-CM

## 2023-03-22 LAB
BASOPHILS # BLD: 0.1 K/UL (ref 0–0.2)
BASOPHILS NFR BLD: 1 % (ref 0–2)
DIFFERENTIAL METHOD BLD: ABNORMAL
EOSINOPHIL # BLD: 0.5 K/UL (ref 0–0.8)
EOSINOPHIL NFR BLD: 5 % (ref 0.5–7.8)
ERYTHROCYTE [DISTWIDTH] IN BLOOD BY AUTOMATED COUNT: 14.4 % (ref 11.9–14.6)
HCT VFR BLD AUTO: 40.4 % (ref 35.8–46.3)
HGB BLD-MCNC: 12.3 G/DL (ref 11.7–15.4)
IMM GRANULOCYTES # BLD AUTO: 0.1 K/UL (ref 0–0.5)
IMM GRANULOCYTES NFR BLD AUTO: 1 % (ref 0–5)
LYMPHOCYTES # BLD: 2 K/UL (ref 0.5–4.6)
LYMPHOCYTES NFR BLD: 22 % (ref 13–44)
MCH RBC QN AUTO: 29.5 PG (ref 26.1–32.9)
MCHC RBC AUTO-ENTMCNC: 30.4 G/DL (ref 31.4–35)
MCV RBC AUTO: 96.9 FL (ref 82–102)
MONOCYTES # BLD: 0.6 K/UL (ref 0.1–1.3)
MONOCYTES NFR BLD: 7 % (ref 4–12)
NEUTS SEG # BLD: 6 K/UL (ref 1.7–8.2)
NEUTS SEG NFR BLD: 65 % (ref 43–78)
NRBC # BLD: 0 K/UL (ref 0–0.2)
PLATELET # BLD AUTO: 540 K/UL (ref 150–450)
PMV BLD AUTO: 9.9 FL (ref 9.4–12.3)
RBC # BLD AUTO: 4.17 M/UL (ref 4.05–5.2)
WBC # BLD AUTO: 9.3 K/UL (ref 4.3–11.1)

## 2023-03-22 PROCEDURE — 3074F SYST BP LT 130 MM HG: CPT | Performed by: STUDENT IN AN ORGANIZED HEALTH CARE EDUCATION/TRAINING PROGRAM

## 2023-03-22 PROCEDURE — 99204 OFFICE O/P NEW MOD 45 MIN: CPT | Performed by: STUDENT IN AN ORGANIZED HEALTH CARE EDUCATION/TRAINING PROGRAM

## 2023-03-22 PROCEDURE — 3078F DIAST BP <80 MM HG: CPT | Performed by: STUDENT IN AN ORGANIZED HEALTH CARE EDUCATION/TRAINING PROGRAM

## 2023-03-22 SDOH — ECONOMIC STABILITY: INCOME INSECURITY: HOW HARD IS IT FOR YOU TO PAY FOR THE VERY BASICS LIKE FOOD, HOUSING, MEDICAL CARE, AND HEATING?: NOT HARD AT ALL

## 2023-03-22 SDOH — ECONOMIC STABILITY: FOOD INSECURITY: WITHIN THE PAST 12 MONTHS, YOU WORRIED THAT YOUR FOOD WOULD RUN OUT BEFORE YOU GOT MONEY TO BUY MORE.: NEVER TRUE

## 2023-03-22 SDOH — ECONOMIC STABILITY: HOUSING INSECURITY
IN THE LAST 12 MONTHS, WAS THERE A TIME WHEN YOU DID NOT HAVE A STEADY PLACE TO SLEEP OR SLEPT IN A SHELTER (INCLUDING NOW)?: NO

## 2023-03-22 SDOH — ECONOMIC STABILITY: FOOD INSECURITY: WITHIN THE PAST 12 MONTHS, THE FOOD YOU BOUGHT JUST DIDN'T LAST AND YOU DIDN'T HAVE MONEY TO GET MORE.: NEVER TRUE

## 2023-03-22 ASSESSMENT — PATIENT HEALTH QUESTIONNAIRE - PHQ9
1. LITTLE INTEREST OR PLEASURE IN DOING THINGS: 0
SUM OF ALL RESPONSES TO PHQ QUESTIONS 1-9: 0
SUM OF ALL RESPONSES TO PHQ9 QUESTIONS 1 & 2: 0
SUM OF ALL RESPONSES TO PHQ QUESTIONS 1-9: 0
2. FEELING DOWN, DEPRESSED OR HOPELESS: 0
SUM OF ALL RESPONSES TO PHQ QUESTIONS 1-9: 0
SUM OF ALL RESPONSES TO PHQ QUESTIONS 1-9: 0

## 2023-03-22 ASSESSMENT — ENCOUNTER SYMPTOMS
NAUSEA: 0
ABDOMINAL PAIN: 0
VOMITING: 0
SHORTNESS OF BREATH: 0

## 2023-03-22 NOTE — PROGRESS NOTES
SUBJECTIVE:   Arun Park is a 59 y.o. female seen for a visit regarding   Chief Complaint   Patient presents with    Establish Care    Thyroid Problem    Cholesterol Problem        HPI:     Hospitalized 3/3/2023 - 3/10/2023 with unstable angina, underwent LHC which showed severe multivessel disease and occluded small caliber RCA. Underwent CABG x4 with LIMA to the LAD, reverse SVG to the diagonal, reverse SVG to the OM1 and reverse SVG to the OM3 on 3/6/23. Echo showed normal EF. Did not tolerate beta-blocker due to hypotension. She has followed up with cardiology was seen by pulmonary for preoperative assessment, COPD was suspected, she has follow-up appointment with pulmonary. Currently taking gabapentin, Tylenol for postoperative pain. HLD:  upon hospitalization. She is on rosuvastatin 40 mg daily. Hypothyroidism: on medication for over 30 years, lost to follow up and had left over pills she was taking. Currently on 100 mcg daily. Last TSH was 2 years ago. Tobacco use: smoking about 10-12 cigarettes a day. Declines wellbutrin, does not want to be on antidepressant. Fatty liver, elevated LFTs: Noted    Healthcare maintenance: s/p ROBERT. Past Medical History, Past Surgical History, Family history, Social History, and Medications were all reviewed with the patient today and updated as necessary.        Patient Active Problem List   Diagnosis    Cigarette smoker    Familial hypercholesteremia    Hypothyroid    Chest pain    Hypertension    Elevated LFTs    Atherosclerotic heart disease of native coronary artery with unstable angina pectoris (HCC)    CAD, multiple vessel    S/P CABG x 4    Encounter for weaning from ventilator ([address]    Agitation    Tobacco abuse    Pleural effusion on left    Fatty liver    Prediabetes    Elevated liver enzymes    Anemia     Past Surgical History:   Procedure Laterality Date    CARDIAC PROCEDURE N/A 03/03/2023    LEFT HEART CATH / CORONARY ANGIOGRAPHY

## 2023-03-23 DIAGNOSIS — R74.8 ELEVATED ALKALINE PHOSPHATASE LEVEL: ICD-10-CM

## 2023-03-23 DIAGNOSIS — E03.9 HYPOTHYROIDISM, UNSPECIFIED TYPE: Primary | ICD-10-CM

## 2023-03-23 LAB
ALBUMIN SERPL-MCNC: 3.9 G/DL (ref 3.2–4.6)
ALBUMIN/GLOB SERPL: 1 (ref 0.4–1.6)
ALP SERPL-CCNC: 252 U/L (ref 50–136)
ALT SERPL-CCNC: 34 U/L (ref 12–65)
ANION GAP SERPL CALC-SCNC: 6 MMOL/L (ref 2–11)
AST SERPL-CCNC: 22 U/L (ref 15–37)
BILIRUB SERPL-MCNC: 0.4 MG/DL (ref 0.2–1.1)
BUN SERPL-MCNC: 9 MG/DL (ref 8–23)
CALCIUM SERPL-MCNC: 9.5 MG/DL (ref 8.3–10.4)
CHLORIDE SERPL-SCNC: 101 MMOL/L (ref 101–110)
CO2 SERPL-SCNC: 30 MMOL/L (ref 21–32)
CREAT SERPL-MCNC: 0.8 MG/DL (ref 0.6–1)
FERRITIN SERPL-MCNC: 227 NG/ML (ref 8–388)
GLOBULIN SER CALC-MCNC: 4.1 G/DL (ref 2.8–4.5)
GLUCOSE SERPL-MCNC: 83 MG/DL (ref 65–100)
HBV CORE AB SERPL QL IA: NEGATIVE
HBV SURFACE AB SERPL IA-ACNC: <3.1 MIU/ML
HBV SURFACE AG SER QL: NONREACTIVE
HCV AB SER QL: NONREACTIVE
IRON SATN MFR SERPL: 14 %
IRON SERPL-MCNC: 49 UG/DL (ref 35–150)
POTASSIUM SERPL-SCNC: 4.9 MMOL/L (ref 3.5–5.1)
PROT SERPL-MCNC: 8 G/DL (ref 6.3–8.2)
SODIUM SERPL-SCNC: 137 MMOL/L (ref 133–143)
T4 FREE SERPL-MCNC: 1.2 NG/DL (ref 0.78–1.46)
TIBC SERPL-MCNC: 360 UG/DL (ref 250–450)
TSH W FREE THYROID IF ABNORMAL: 10.1 UIU/ML (ref 0.36–3.74)
VIT B12 SERPL-MCNC: 635 PG/ML (ref 193–986)

## 2023-03-23 RX ORDER — LEVOTHYROXINE SODIUM 0.12 MG/1
125 TABLET ORAL DAILY
Qty: 60 TABLET | Refills: 0 | Status: SHIPPED | OUTPATIENT
Start: 2023-03-23

## 2023-03-28 NOTE — PROGRESS NOTES
Physician Progress Note      PATIENT:               Ned Taveras  CSN #:                  [id number]  :                       1960  ADMIT DATE:       3/3/2023 5:58 AM  DISCH DATE:        3/10/2023 11:54 AM  RESPONDING  PROVIDER #:        Montana Benedict MD          QUERY TEXT:    Patient admitted with chest pain. If possible, please document in the   progress notes and discharge summary if you are evaluating and/or treating any   of the following: The medical record reflects the following:  Risk Factors: 59 YOF with CAD, smoker, HTN, presents with chest pain  Clinical Indicators: Consult report  -Myocardial infarction. DS report   03/10-Pt underwent cardiac catheterization that showed severe multivessel   disease with occluded small caliber RCA. DS report 03/10- Pt underwent CABG x   4 with LIMA to the LAD, reverse SVG to the diagonal, reverse SVG to the OM1   and reverse SVG to the OM3  Treatment: Cardiac Cath, CABG x4, Lasix, heparin  Options provided:  -- MI ruled in  -- MI ruled out  -- Other - I will add my own diagnosis  -- Disagree - Not applicable / Not valid  -- Disagree - Clinically unable to determine / Unknown  -- Refer to Clinical Documentation Reviewer    PROVIDER RESPONSE TEXT:    This patient had a MI.     Query created by: Mendez Watters on 3/16/2023 10:29 AM      Electronically signed by:  Montana Benedict MD 3/28/2023 12:01 PM

## 2023-03-29 ENCOUNTER — OFFICE VISIT (OUTPATIENT)
Dept: CARDIOTHORACIC SURGERY | Age: 63
End: 2023-03-29

## 2023-03-29 VITALS
HEIGHT: 67 IN | WEIGHT: 168.8 LBS | SYSTOLIC BLOOD PRESSURE: 120 MMHG | HEART RATE: 87 BPM | BODY MASS INDEX: 26.49 KG/M2 | DIASTOLIC BLOOD PRESSURE: 78 MMHG

## 2023-03-29 DIAGNOSIS — Z95.1 S/P CABG X 4: Primary | ICD-10-CM

## 2023-03-29 PROCEDURE — 99024 POSTOP FOLLOW-UP VISIT: CPT | Performed by: PHYSICIAN ASSISTANT

## 2023-03-29 NOTE — PROGRESS NOTES
[address] [facility]  Sami Dunbar. Galena Park MD Herrera Griffiths. Elma Dockery MD          Riya Rao       [id number]  3/29/2023       1960      Riya Rao is seen today for follow-up status post CABG x 4 with LIMA to the LAD, reverse SVG to the diagonal, reverse SVG to the OM1 and reverse SVG to the OM3 on 3/6/23. She did well post operatively and was transferred to [facility] on POD 1. She was hypotensive and did not tolerate a BB post op. She progressed well with PT. She was gradually weaned off of O2. She remained in sinus rhythm. She was discharged home on 3/10/23. she is active and ambulatory. There is no fever or shortness of breath. Appetite is fair, eating a lot of salads. Pain has improved. Pt is sleeping ok. She has resumed smoking, initially 1-2 cigarettes a day but more now. EXAM:  Vitals:  Blood pressure 120/78, pulse 87, height 5' 7\" (1.702 m), weight 168 lb 12.8 oz (76.6 kg). Lungs:  Clear to auscultation bilaterally. Heart: Regular rate and rhythm without murmurs. Incision: Sternum stable. Incision healing well. Leg incisions healing well. IMPRESSION and PLAN:  smoking cessation strongly encouraged. She did not watch nicotine patches. She is optimistic that cardiac rehab will help her quit. DC from CT surgery. Pt may drive. Pt is planning to begin cardiac rehab. Sternal precautions: Pt advised to avoid any heavy lifting for another 4 weeks but can increase activity as tolerated. Pt has cardiology follow-up with Dr. Neeraj Yang. No need to schedule follow-up at this point but the patient may call or return anytime if issues or questions arise. Jenna Faulkner.  KERRI Weems  3/29/2023 3:08 PM

## 2023-03-31 ENCOUNTER — HOSPITAL ENCOUNTER (OUTPATIENT)
Dept: CARDIAC REHAB | Age: 63
Setting detail: RECURRING SERIES
Discharge: HOME | End: 2023-04-03

## 2023-03-31 ASSESSMENT — PATIENT HEALTH QUESTIONNAIRE - PHQ9
7. TROUBLE CONCENTRATING ON THINGS, SUCH AS READING THE NEWSPAPER OR WATCHING TELEVISION: 0
4. FEELING TIRED OR HAVING LITTLE ENERGY: 1
10. IF YOU CHECKED OFF ANY PROBLEMS, HOW DIFFICULT HAVE THESE PROBLEMS MADE IT FOR YOU TO DO YOUR WORK, TAKE CARE OF THINGS AT HOME, OR GET ALONG WITH OTHER PEOPLE: 0
8. MOVING OR SPEAKING SO SLOWLY THAT OTHER PEOPLE COULD HAVE NOTICED. OR THE OPPOSITE, BEING SO FIGETY OR RESTLESS THAT YOU HAVE BEEN MOVING AROUND A LOT MORE THAN USUAL: 0
SUM OF ALL RESPONSES TO PHQ QUESTIONS 1-9: 1
5. POOR APPETITE OR OVEREATING: 0
SUM OF ALL RESPONSES TO PHQ QUESTIONS 1-9: 1
SUM OF ALL RESPONSES TO PHQ9 QUESTIONS 1 & 2: 0
3. TROUBLE FALLING OR STAYING ASLEEP: 0
2. FEELING DOWN, DEPRESSED OR HOPELESS: 0
6. FEELING BAD ABOUT YOURSELF - OR THAT YOU ARE A FAILURE OR HAVE LET YOURSELF OR YOUR FAMILY DOWN: 0
9. THOUGHTS THAT YOU WOULD BE BETTER OFF DEAD, OR OF HURTING YOURSELF: 0
1. LITTLE INTEREST OR PLEASURE IN DOING THINGS: 0

## 2023-03-31 ASSESSMENT — EJECTION FRACTION: EF_VALUE: 60

## 2023-03-31 ASSESSMENT — LIFESTYLE VARIABLES
SMOKELESS_TOBACCO: NO
CIGARETTES_PER_DAY: 0.5PPD

## 2023-03-31 NOTE — PROGRESS NOTES
Dear Dr. Eric Ogden,    Thank you for referring your patient,Ms. Jasbir Puentes ( 1960), to the Cardiopulmonary Rehabilitation Program at [facility]. She is a good candidate for the Cardiac Rehab Program and should see improvements with regular participation. We will be addressing appropriate interventions for modifiable risk factors with your patient during the next 12 weeks. We will contact you with any issues or concerns that may arise, or you can follow your patient's progress through [address] [place] [address] at any time. A final summary will be sent to you when the program is completed. Again, thank you for the referral. If we can be of further assistance, please feel free to contact the Cardiopulmonary Rehab staff at [phone].     Sincerely,    MAK MonsivaisN, RN  Cardiopulmonary Rehabilitation Nurse Liaison  [facility]

## 2023-04-07 ENCOUNTER — TELEPHONE (OUTPATIENT)
Dept: PULMONOLOGY | Age: 63
End: 2023-04-07

## 2023-04-07 DIAGNOSIS — J90 PLEURAL EFFUSION ON LEFT: Primary | ICD-10-CM

## 2023-04-07 NOTE — TELEPHONE ENCOUNTER
Spoke with the patient to remind her to do chest xray prior to her appointment with Ms. Hans Loya on Monday April 10, 2023. Patient voice understanding.

## 2023-04-11 ASSESSMENT — EXERCISE STRESS TEST
PEAK_HR: 98
PEAK_BP: 140/68

## 2023-04-13 ENCOUNTER — HOSPITAL ENCOUNTER (OUTPATIENT)
Dept: CARDIAC REHAB | Age: 63
Setting detail: RECURRING SERIES
Discharge: HOME | End: 2023-04-16
Payer: COMMERCIAL

## 2023-04-13 PROCEDURE — 93798 PHYS/QHP OP CAR RHAB W/ECG: CPT

## 2023-04-13 ASSESSMENT — EXERCISE STRESS TEST
PEAK_HR: 107
PEAK_BP: 94/60
PEAK_METS: 2.5

## 2023-04-17 ENCOUNTER — HOSPITAL ENCOUNTER (OUTPATIENT)
Dept: CARDIAC REHAB | Age: 63
Setting detail: RECURRING SERIES
Discharge: HOME | End: 2023-04-20
Payer: COMMERCIAL

## 2023-04-17 PROCEDURE — 93798 PHYS/QHP OP CAR RHAB W/ECG: CPT

## 2023-04-17 ASSESSMENT — EXERCISE STRESS TEST
PEAK_BP: 108/70
PEAK_METS: 2.5
PEAK_HR: 108

## 2023-04-19 ENCOUNTER — HOSPITAL ENCOUNTER (OUTPATIENT)
Dept: CARDIAC REHAB | Age: 63
Setting detail: RECURRING SERIES
Discharge: HOME | End: 2023-04-22
Payer: COMMERCIAL

## 2023-04-19 VITALS — WEIGHT: 167 LBS | BODY MASS INDEX: 25.39 KG/M2

## 2023-04-19 PROCEDURE — 93798 PHYS/QHP OP CAR RHAB W/ECG: CPT

## 2023-04-19 ASSESSMENT — EXERCISE STRESS TEST
PEAK_BP: 98/68
PEAK_HR: 107
PEAK_METS: 2.5

## 2023-04-20 ENCOUNTER — HOSPITAL ENCOUNTER (OUTPATIENT)
Dept: CARDIAC REHAB | Age: 63
Setting detail: RECURRING SERIES
Discharge: HOME | End: 2023-04-23
Payer: COMMERCIAL

## 2023-04-20 PROCEDURE — 93798 PHYS/QHP OP CAR RHAB W/ECG: CPT

## 2023-04-20 ASSESSMENT — EXERCISE STRESS TEST
PEAK_HR: 106
PEAK_METS: 2.5
PEAK_BP: 92/54

## 2023-04-24 ENCOUNTER — HOSPITAL ENCOUNTER (OUTPATIENT)
Dept: CARDIAC REHAB | Age: 63
Setting detail: RECURRING SERIES
Discharge: HOME | End: 2023-04-27
Payer: COMMERCIAL

## 2023-04-24 PROCEDURE — 93798 PHYS/QHP OP CAR RHAB W/ECG: CPT

## 2023-04-24 ASSESSMENT — EXERCISE STRESS TEST
PEAK_HR: 109
PEAK_BP: 100/60
PEAK_METS: 2.9

## 2023-04-27 ENCOUNTER — HOSPITAL ENCOUNTER (OUTPATIENT)
Dept: CARDIAC REHAB | Age: 63
Setting detail: RECURRING SERIES
Discharge: HOME | End: 2023-04-30
Payer: COMMERCIAL

## 2023-04-27 PROCEDURE — 93798 PHYS/QHP OP CAR RHAB W/ECG: CPT

## 2023-04-27 ASSESSMENT — EXERCISE STRESS TEST
PEAK_BP: 98/60
PEAK_HR: 103
PEAK_METS: 3

## 2023-04-27 ASSESSMENT — LIFESTYLE VARIABLES: SMOKELESS_TOBACCO: YES

## 2023-05-01 ENCOUNTER — HOSPITAL ENCOUNTER (OUTPATIENT)
Dept: CARDIAC REHAB | Age: 63
Setting detail: RECURRING SERIES
Discharge: HOME | End: 2023-05-04
Payer: COMMERCIAL

## 2023-05-01 PROCEDURE — 93798 PHYS/QHP OP CAR RHAB W/ECG: CPT

## 2023-05-01 ASSESSMENT — EXERCISE STRESS TEST
PEAK_BP: 92/64
PEAK_HR: 110
PEAK_METS: 3

## 2023-05-03 ENCOUNTER — HOSPITAL ENCOUNTER (OUTPATIENT)
Dept: CARDIAC REHAB | Age: 63
Setting detail: RECURRING SERIES
Discharge: HOME | End: 2023-05-06
Payer: COMMERCIAL

## 2023-05-03 PROCEDURE — 93798 PHYS/QHP OP CAR RHAB W/ECG: CPT

## 2023-05-03 ASSESSMENT — EXERCISE STRESS TEST
PEAK_BP: 132/66
PEAK_METS: 3
PEAK_HR: 109

## 2023-05-04 ENCOUNTER — HOSPITAL ENCOUNTER (OUTPATIENT)
Dept: CARDIAC REHAB | Age: 63
Setting detail: RECURRING SERIES
Discharge: HOME | End: 2023-05-07
Payer: COMMERCIAL

## 2023-05-04 PROCEDURE — 93798 PHYS/QHP OP CAR RHAB W/ECG: CPT

## 2023-05-04 ASSESSMENT — EXERCISE STRESS TEST
PEAK_BP: 104/62
PEAK_METS: 3
PEAK_HR: 102

## 2023-05-05 VITALS — WEIGHT: 168.8 LBS | BODY MASS INDEX: 25.67 KG/M2

## 2023-05-08 ENCOUNTER — HOSPITAL ENCOUNTER (OUTPATIENT)
Dept: CARDIAC REHAB | Age: 63
Setting detail: RECURRING SERIES
Discharge: HOME | End: 2023-05-11
Payer: COMMERCIAL

## 2023-05-08 PROCEDURE — 93798 PHYS/QHP OP CAR RHAB W/ECG: CPT

## 2023-05-08 ASSESSMENT — EXERCISE STRESS TEST
PEAK_METS: 3
PEAK_HR: 118
PEAK_BP: 90/56

## 2023-05-09 ASSESSMENT — LIFESTYLE VARIABLES
CIGARETTES_PER_DAY: 0.5PPD
SMOKELESS_TOBACCO: NO

## 2023-05-09 ASSESSMENT — EXERCISE STRESS TEST: PEAK_BP: 110/62

## 2023-05-09 ASSESSMENT — EJECTION FRACTION: EF_VALUE: 60

## 2023-05-10 ENCOUNTER — HOSPITAL ENCOUNTER (OUTPATIENT)
Dept: CARDIAC REHAB | Age: 63
Setting detail: RECURRING SERIES
Discharge: HOME | End: 2023-05-13
Payer: COMMERCIAL

## 2023-05-10 ENCOUNTER — TELEPHONE (OUTPATIENT)
Dept: CARDIAC REHAB | Age: 63
End: 2023-05-10

## 2023-05-10 ENCOUNTER — TELEPHONE (OUTPATIENT)
Age: 63
End: 2023-05-10

## 2023-05-10 VITALS — BODY MASS INDEX: 25.67 KG/M2 | WEIGHT: 168.8 LBS

## 2023-05-10 PROCEDURE — 93798 PHYS/QHP OP CAR RHAB W/ECG: CPT

## 2023-05-10 ASSESSMENT — EXERCISE STRESS TEST
PEAK_BP: 92/66
PEAK_HR: 109
PEAK_METS: 3

## 2023-05-10 NOTE — TELEPHONE ENCOUNTER
Per Cardiac Rehab note pt had been fatigued this past week. Had near syncopal episode yesterday. Vitals at rehab yesterday as follows: On arrival bp 102/66  pulse 98  During exercise bp 92/66 pulse 109  Post exercise 94/60 pulse 93. Pt last seen on 03-21-23. No bp meds on med list. Bp 110/66,pulse 64 and weight 173 at this visit. Heart Cath on 03-03-23 showed Multivessel CAD. CABG on 03-10-23. Called pt. NO answer.  Left message of receiving note from Cardiac Rehab and would be calling her back in the am./wc

## 2023-05-10 NOTE — TELEPHONE ENCOUNTER
Can call patient and can call rehab if need be. Hypotensive in past week. Fatigue  light headed  near syncope yesterday  See rehab notes .

## 2023-05-10 NOTE — TELEPHONE ENCOUNTER
Patient states she has been extremely fatigued in the past week and had near syncopal episode yesterday. Blood pressure at rehab sessions have been trending lower. Discussed importance of drinking water, wearing compression socks and elevating legs as needed. Patient states she has been drinking \"plenty of water'. Currently no BP meds prescribed. Current smoker. Vitals this date (05/10/23):      BP at arrival 102/66  BP during exercise: 92/66  BP post exercise: 94/60    HR at arrival : 98 bpm  HR peak exercise: 109 bpm  HR at recovery: 93 bpm    Wt 168.8 lbs     Patient advised EMS/ ED with near syncopal or syncopal episode. Stated I would contact cardiologist and inform her of changes and concerns.

## 2023-05-11 NOTE — TELEPHONE ENCOUNTER
On no BP lowering meds. Make sure she is eating properly and frequently enough, hydrating well, check in with her PCP to make sure thyroid adequately treated, wear compression socks during the day, make sure not taking any muscle relaxers or narcotic pain meds not on her list, and continue to monitor.

## 2023-05-15 ENCOUNTER — HOSPITAL ENCOUNTER (OUTPATIENT)
Dept: CARDIAC REHAB | Age: 63
Setting detail: RECURRING SERIES
Discharge: HOME | End: 2023-05-18
Payer: COMMERCIAL

## 2023-05-15 PROCEDURE — 93798 PHYS/QHP OP CAR RHAB W/ECG: CPT

## 2023-05-15 ASSESSMENT — EXERCISE STRESS TEST
PEAK_BP: 86/52
PEAK_METS: 3
PEAK_HR: 112

## 2023-05-17 ENCOUNTER — HOSPITAL ENCOUNTER (OUTPATIENT)
Dept: CARDIAC REHAB | Age: 63
Setting detail: RECURRING SERIES
Discharge: HOME | End: 2023-05-20
Payer: COMMERCIAL

## 2023-05-17 VITALS — WEIGHT: 171.1 LBS | BODY MASS INDEX: 26.02 KG/M2

## 2023-05-17 PROCEDURE — 93798 PHYS/QHP OP CAR RHAB W/ECG: CPT

## 2023-05-17 ASSESSMENT — EXERCISE STRESS TEST
PEAK_BP: 108/60
PEAK_METS: 3.1
PEAK_HR: 114

## 2023-05-19 ENCOUNTER — HOSPITAL ENCOUNTER (OUTPATIENT)
Dept: CARDIAC REHAB | Age: 63
Setting detail: RECURRING SERIES
Discharge: HOME | End: 2023-05-22
Payer: COMMERCIAL

## 2023-05-19 PROCEDURE — 93798 PHYS/QHP OP CAR RHAB W/ECG: CPT

## 2023-05-19 ASSESSMENT — EXERCISE STRESS TEST
PEAK_BP: 80/54
PEAK_HR: 108
PEAK_METS: 3.1

## 2023-05-22 ENCOUNTER — HOSPITAL ENCOUNTER (OUTPATIENT)
Dept: CARDIAC REHAB | Age: 63
Setting detail: RECURRING SERIES
Discharge: HOME | End: 2023-05-25
Payer: COMMERCIAL

## 2023-05-22 PROCEDURE — 93798 PHYS/QHP OP CAR RHAB W/ECG: CPT

## 2023-05-22 ASSESSMENT — EXERCISE STRESS TEST
PEAK_BP: 96/64
PEAK_HR: 107
PEAK_METS: 3.1

## 2023-05-23 NOTE — PROGRESS NOTES
[facility] CARDIOLOGY  [address], [address]  PHONE: [phone]      23    NAME:  Aravind Johnson  : 1960  MRN: [id number]         SUBJECTIVE:   Aravind Johnson is a 59 y.o. female seen for a follow up visit regarding the following:     Chief Complaint   Patient presents with    Hypotension            HPI:  Follow up  Hypotension   . Patient with past medical history of tobacco abuse, dyslipidemia and hypothyroidism, probably familial dyslipidemia, carotid artery stenosis and recent CABG. Returns having had some low BP's in cardiac rehab. She is asymptomatic with BP at times in the 90's after exercise which she tolerates well. Drinks plenty of water. Stays active at home. Still with some mild soreness but appropriate. She is eating well. Still smoking but working on it with rehab staff. Asked about every imaginable symptom of hypotension which she denies, stating the staff get concerned about it but it hasn't bothered  her. But then on the way out she mentions 2 episodes of \"blacking out\". She had 2 brief spells of tunnel vision about 2 weeks ago while in her yard, lasted moments, stopped what she was doing and it passed, hasn't had it since. PAST CARDIAC HISTORY:  Mar 2023       CABG - LIMA-LAD, SVG-Dx, SVG-OM1, SVG-OM3  (occluded small RCA)       Echo - normal SF, DF, RVSP 21       Carotid doppler 50-69% DIEGO , Distal RCCA: 48 cm/s DIEGO 152 cm/s. Ratio = 3.2.  -discharged with vascular follow up                Raymundo CAD CHF Meds            rosuvastatin (CRESTOR) 40 MG tablet (Taking)    Sig - Route: Take 1 tablet by mouth nightly - Oral          Key Antihyperglycemic Medications       Patient is on no antihyperglycemic meds. Past Medical History, Past Surgical History, Family history, Social History, and Medications were all reviewed with the patient today and updated as necessary.      Prior to Admission medications    Medication Sig

## 2023-05-24 ENCOUNTER — HOSPITAL ENCOUNTER (OUTPATIENT)
Dept: CARDIAC REHAB | Age: 63
Setting detail: RECURRING SERIES
Discharge: HOME | End: 2023-05-27
Payer: COMMERCIAL

## 2023-05-24 ENCOUNTER — OFFICE VISIT (OUTPATIENT)
Age: 63
End: 2023-05-24

## 2023-05-24 VITALS
HEIGHT: 68 IN | SYSTOLIC BLOOD PRESSURE: 100 MMHG | DIASTOLIC BLOOD PRESSURE: 60 MMHG | WEIGHT: 169.8 LBS | BODY MASS INDEX: 25.73 KG/M2 | HEART RATE: 78 BPM

## 2023-05-24 DIAGNOSIS — I95.0 IDIOPATHIC HYPOTENSION: Primary | ICD-10-CM

## 2023-05-24 DIAGNOSIS — Z72.0 TOBACCO ABUSE: ICD-10-CM

## 2023-05-24 DIAGNOSIS — I25.10 CAD, MULTIPLE VESSEL: ICD-10-CM

## 2023-05-24 DIAGNOSIS — I65.21 CAROTID STENOSIS, RIGHT: ICD-10-CM

## 2023-05-24 DIAGNOSIS — E78.5 DYSLIPIDEMIA: ICD-10-CM

## 2023-05-24 DIAGNOSIS — Z95.1 S/P CABG X 4: ICD-10-CM

## 2023-05-24 PROCEDURE — 93798 PHYS/QHP OP CAR RHAB W/ECG: CPT

## 2023-05-24 ASSESSMENT — EXERCISE STRESS TEST
PEAK_BP: 84/50
PEAK_METS: 3.1
PEAK_HR: 111

## 2023-05-25 ENCOUNTER — HOSPITAL ENCOUNTER (OUTPATIENT)
Dept: CARDIAC REHAB | Age: 63
Setting detail: RECURRING SERIES
Discharge: HOME | End: 2023-05-28
Payer: COMMERCIAL

## 2023-05-25 PROCEDURE — 93798 PHYS/QHP OP CAR RHAB W/ECG: CPT

## 2023-05-25 ASSESSMENT — EXERCISE STRESS TEST
PEAK_BP: 84/54
PEAK_HR: 107
PEAK_METS: 3.1

## 2023-05-31 ENCOUNTER — HOSPITAL ENCOUNTER (OUTPATIENT)
Dept: CARDIAC REHAB | Age: 63
Setting detail: RECURRING SERIES
Discharge: HOME | End: 2023-06-03
Payer: COMMERCIAL

## 2023-05-31 VITALS — WEIGHT: 168.4 LBS | BODY MASS INDEX: 25.61 KG/M2

## 2023-05-31 PROCEDURE — 93798 PHYS/QHP OP CAR RHAB W/ECG: CPT

## 2023-05-31 ASSESSMENT — EXERCISE STRESS TEST
PEAK_METS: 3.1
PEAK_HR: 108
PEAK_BP: 90/52

## 2023-06-01 ENCOUNTER — HOSPITAL ENCOUNTER (OUTPATIENT)
Dept: CARDIAC REHAB | Age: 63
Setting detail: RECURRING SERIES
Discharge: HOME | End: 2023-06-04
Payer: COMMERCIAL

## 2023-06-01 PROCEDURE — 93798 PHYS/QHP OP CAR RHAB W/ECG: CPT

## 2023-06-01 ASSESSMENT — EXERCISE STRESS TEST
PEAK_HR: 109
PEAK_BP: 90/52
PEAK_BP: 82/56
PEAK_METS: 3.1

## 2023-06-01 ASSESSMENT — LIFESTYLE VARIABLES
SMOKELESS_TOBACCO: NO
CIGARETTES_PER_DAY: 0.5PPD

## 2023-06-01 ASSESSMENT — EJECTION FRACTION: EF_VALUE: 60

## 2023-06-05 ENCOUNTER — OFFICE VISIT (OUTPATIENT)
Dept: VASCULAR SURGERY | Age: 63
End: 2023-06-05
Payer: COMMERCIAL

## 2023-06-05 ENCOUNTER — HOSPITAL ENCOUNTER (OUTPATIENT)
Dept: CARDIAC REHAB | Age: 63
Setting detail: RECURRING SERIES
Discharge: HOME | End: 2023-06-08
Payer: COMMERCIAL

## 2023-06-05 VITALS
HEART RATE: 69 BPM | SYSTOLIC BLOOD PRESSURE: 130 MMHG | HEIGHT: 68 IN | OXYGEN SATURATION: 99 % | WEIGHT: 172 LBS | DIASTOLIC BLOOD PRESSURE: 81 MMHG | BODY MASS INDEX: 26.07 KG/M2

## 2023-06-05 DIAGNOSIS — I65.21 RIGHT-SIDED EXTRACRANIAL CAROTID ARTERY STENOSIS: Primary | ICD-10-CM

## 2023-06-05 PROCEDURE — 3079F DIAST BP 80-89 MM HG: CPT | Performed by: STUDENT IN AN ORGANIZED HEALTH CARE EDUCATION/TRAINING PROGRAM

## 2023-06-05 PROCEDURE — 3074F SYST BP LT 130 MM HG: CPT | Performed by: STUDENT IN AN ORGANIZED HEALTH CARE EDUCATION/TRAINING PROGRAM

## 2023-06-05 PROCEDURE — 93798 PHYS/QHP OP CAR RHAB W/ECG: CPT

## 2023-06-05 PROCEDURE — 99204 OFFICE O/P NEW MOD 45 MIN: CPT | Performed by: STUDENT IN AN ORGANIZED HEALTH CARE EDUCATION/TRAINING PROGRAM

## 2023-06-05 ASSESSMENT — EXERCISE STRESS TEST
PEAK_METS: 3.1
PEAK_BP: 110/60
PEAK_HR: 107

## 2023-06-05 NOTE — PROGRESS NOTES
for stroke. Behavioral/Psych:   Negative for suicidal ideations. Endocrine:   Negative for uncontrolled diabetic symptoms including polyuria, polydipsia and poor wound healing. Complexity of illness:  HTN, HLD, and CAD    Statin: Yes     DAPT/AC: ASA    Imaging interpreted: Carotid DUS    Procedures by BSVS:None    Metal implants or AICD: no    Dye allergy: no     Smoker:  Tobacco Use      Smoking status: Every Day        Packs/day: 1.00        Years: 35.00        Pack years: 35        Types: Cigarettes        Start date: 2/3/1975      Smokeless tobacco: Never      Referred by: Jennifer Christianson,*    PCP:MD Diane Damico MD    Elements of this note have been dictated using speech recognition software. As a result, errors of speech recognition may have occurred.

## 2023-06-07 ENCOUNTER — HOSPITAL ENCOUNTER (OUTPATIENT)
Dept: CARDIAC REHAB | Age: 63
Setting detail: RECURRING SERIES
Discharge: HOME | End: 2023-06-10
Payer: COMMERCIAL

## 2023-06-07 VITALS — WEIGHT: 170.4 LBS | BODY MASS INDEX: 25.91 KG/M2

## 2023-06-07 PROCEDURE — 93798 PHYS/QHP OP CAR RHAB W/ECG: CPT

## 2023-06-07 ASSESSMENT — EXERCISE STRESS TEST
PEAK_HR: 107
PEAK_BP: 100/70
PEAK_METS: 3.8

## 2023-06-15 ENCOUNTER — HOSPITAL ENCOUNTER (OUTPATIENT)
Dept: CARDIAC REHAB | Age: 63
Setting detail: RECURRING SERIES
Discharge: HOME | End: 2023-06-18
Payer: COMMERCIAL

## 2023-06-15 PROCEDURE — 93798 PHYS/QHP OP CAR RHAB W/ECG: CPT

## 2023-06-15 ASSESSMENT — EXERCISE STRESS TEST
PEAK_BP: 84/52
PEAK_HR: 109
PEAK_METS: 3.8

## 2023-06-19 ENCOUNTER — HOSPITAL ENCOUNTER (OUTPATIENT)
Dept: CARDIAC REHAB | Age: 63
Setting detail: RECURRING SERIES
Discharge: HOME | End: 2023-06-22
Payer: COMMERCIAL

## 2023-06-19 PROCEDURE — 93798 PHYS/QHP OP CAR RHAB W/ECG: CPT

## 2023-06-19 ASSESSMENT — EXERCISE STRESS TEST
PEAK_METS: 3.8
PEAK_HR: 105
PEAK_BP: 90/56

## 2023-06-26 ENCOUNTER — HOSPITAL ENCOUNTER (OUTPATIENT)
Dept: CARDIAC REHAB | Age: 63
Setting detail: RECURRING SERIES
Discharge: HOME | End: 2023-06-29
Payer: COMMERCIAL

## 2023-06-26 PROCEDURE — 93798 PHYS/QHP OP CAR RHAB W/ECG: CPT

## 2023-06-26 ASSESSMENT — EXERCISE STRESS TEST
PEAK_METS: 3.8
PEAK_BP: 96/56
PEAK_HR: 107

## 2023-06-28 ENCOUNTER — HOSPITAL ENCOUNTER (OUTPATIENT)
Dept: CARDIAC REHAB | Age: 63
Setting detail: RECURRING SERIES
Discharge: HOME | End: 2023-07-01
Payer: COMMERCIAL

## 2023-06-28 VITALS — BODY MASS INDEX: 25 KG/M2 | WEIGHT: 164.4 LBS

## 2023-06-28 PROCEDURE — 93798 PHYS/QHP OP CAR RHAB W/ECG: CPT

## 2023-06-28 ASSESSMENT — EXERCISE STRESS TEST
PEAK_METS: 3.8
PEAK_BP: 90/42
PEAK_HR: 111
PEAK_BP: 96/56

## 2023-06-28 ASSESSMENT — LIFESTYLE VARIABLES
CIGARETTES_PER_DAY: 0.5PPD
SMOKELESS_TOBACCO: NO

## 2023-06-28 ASSESSMENT — EJECTION FRACTION: EF_VALUE: 60

## 2023-06-29 ENCOUNTER — HOSPITAL ENCOUNTER (OUTPATIENT)
Dept: CARDIAC REHAB | Age: 63
Setting detail: RECURRING SERIES
End: 2023-06-29
Payer: COMMERCIAL

## 2023-06-29 PROCEDURE — 93798 PHYS/QHP OP CAR RHAB W/ECG: CPT

## 2023-06-29 ASSESSMENT — EXERCISE STRESS TEST
PEAK_HR: 106
PEAK_METS: 3.8
PEAK_BP: 86/54

## 2023-06-30 ASSESSMENT — PATIENT HEALTH QUESTIONNAIRE - PHQ9
SUM OF ALL RESPONSES TO PHQ QUESTIONS 1-9: 9
3. TROUBLE FALLING OR STAYING ASLEEP: 2
10. IF YOU CHECKED OFF ANY PROBLEMS, HOW DIFFICULT HAVE THESE PROBLEMS MADE IT FOR YOU TO DO YOUR WORK, TAKE CARE OF THINGS AT HOME, OR GET ALONG WITH OTHER PEOPLE: 1
SUM OF ALL RESPONSES TO PHQ QUESTIONS 1-9: 9
2. FEELING DOWN, DEPRESSED OR HOPELESS: 1
9. THOUGHTS THAT YOU WOULD BE BETTER OFF DEAD, OR OF HURTING YOURSELF: 0
8. MOVING OR SPEAKING SO SLOWLY THAT OTHER PEOPLE COULD HAVE NOTICED. OR THE OPPOSITE, BEING SO FIGETY OR RESTLESS THAT YOU HAVE BEEN MOVING AROUND A LOT MORE THAN USUAL: 1
7. TROUBLE CONCENTRATING ON THINGS, SUCH AS READING THE NEWSPAPER OR WATCHING TELEVISION: 0
SUM OF ALL RESPONSES TO PHQ QUESTIONS 1-9: 9
1. LITTLE INTEREST OR PLEASURE IN DOING THINGS: 1
6. FEELING BAD ABOUT YOURSELF - OR THAT YOU ARE A FAILURE OR HAVE LET YOURSELF OR YOUR FAMILY DOWN: 2
4. FEELING TIRED OR HAVING LITTLE ENERGY: 2
SUM OF ALL RESPONSES TO PHQ9 QUESTIONS 1 & 2: 2
SUM OF ALL RESPONSES TO PHQ QUESTIONS 1-9: 9
5. POOR APPETITE OR OVEREATING: 0

## 2023-07-03 ENCOUNTER — HOSPITAL ENCOUNTER (OUTPATIENT)
Dept: CARDIAC REHAB | Age: 63
Setting detail: RECURRING SERIES
Discharge: HOME | End: 2023-07-06
Payer: COMMERCIAL

## 2023-07-03 PROCEDURE — 93798 PHYS/QHP OP CAR RHAB W/ECG: CPT

## 2023-07-03 ASSESSMENT — EXERCISE STRESS TEST
PEAK_BP: 100/84
PEAK_HR: 101
PEAK_METS: 3.8

## 2023-07-05 ENCOUNTER — HOSPITAL ENCOUNTER (OUTPATIENT)
Dept: CARDIAC REHAB | Age: 63
Setting detail: RECURRING SERIES
Discharge: HOME | End: 2023-07-08
Payer: COMMERCIAL

## 2023-07-05 VITALS — WEIGHT: 165.2 LBS | BODY MASS INDEX: 25.12 KG/M2

## 2023-07-05 PROCEDURE — 93798 PHYS/QHP OP CAR RHAB W/ECG: CPT

## 2023-07-05 ASSESSMENT — EXERCISE STRESS TEST
PEAK_HR: 109
PEAK_BP: 80/52
PEAK_METS: 3.8

## 2023-07-06 ENCOUNTER — HOSPITAL ENCOUNTER (OUTPATIENT)
Dept: CARDIAC REHAB | Age: 63
Setting detail: RECURRING SERIES
Discharge: HOME | End: 2023-07-09
Payer: COMMERCIAL

## 2023-07-06 PROCEDURE — 93798 PHYS/QHP OP CAR RHAB W/ECG: CPT

## 2023-07-06 ASSESSMENT — EXERCISE STRESS TEST
PEAK_METS: 3.8
PEAK_HR: 106
PEAK_BP: 78/58

## 2023-07-10 ENCOUNTER — HOSPITAL ENCOUNTER (OUTPATIENT)
Dept: CARDIAC REHAB | Age: 63
Setting detail: RECURRING SERIES
Discharge: HOME | End: 2023-07-13
Payer: COMMERCIAL

## 2023-07-10 PROCEDURE — 93798 PHYS/QHP OP CAR RHAB W/ECG: CPT

## 2023-07-10 ASSESSMENT — EXERCISE STRESS TEST
PEAK_HR: 106
PEAK_METS: 3.8
PEAK_BP: 90/54

## 2023-07-12 ENCOUNTER — HOSPITAL ENCOUNTER (OUTPATIENT)
Dept: CARDIAC REHAB | Age: 63
Setting detail: RECURRING SERIES
Discharge: HOME | End: 2023-07-15
Payer: COMMERCIAL

## 2023-07-12 VITALS — BODY MASS INDEX: 25.24 KG/M2 | WEIGHT: 166 LBS

## 2023-07-12 PROCEDURE — 93798 PHYS/QHP OP CAR RHAB W/ECG: CPT

## 2023-07-12 ASSESSMENT — EXERCISE STRESS TEST
PEAK_HR: 111
PEAK_BP: 90/54
PEAK_METS: 3.8
PEAK_HR: 111
PEAK_BP: 104/62
PEAK_BP: 90/42

## 2023-07-12 ASSESSMENT — EJECTION FRACTION: EF_VALUE: 60

## 2023-07-12 ASSESSMENT — LIFESTYLE VARIABLES
SMOKELESS_TOBACCO: NO
CIGARETTES_PER_DAY: 0.5PPD

## 2023-08-15 ENCOUNTER — OFFICE VISIT (OUTPATIENT)
Dept: PULMONOLOGY | Age: 63
End: 2023-08-15

## 2023-08-15 VITALS
OXYGEN SATURATION: 97 % | TEMPERATURE: 97.5 F | RESPIRATION RATE: 18 BRPM | HEART RATE: 87 BPM | SYSTOLIC BLOOD PRESSURE: 124 MMHG | WEIGHT: 163 LBS | BODY MASS INDEX: 26.2 KG/M2 | DIASTOLIC BLOOD PRESSURE: 70 MMHG | HEIGHT: 66 IN

## 2023-08-15 DIAGNOSIS — F17.200 NICOTINE DEPENDENCE WITH CURRENT USE: Primary | ICD-10-CM

## 2023-08-15 DIAGNOSIS — Z87.09 HISTORY OF ASTHMA: ICD-10-CM

## 2023-08-15 LAB
EXPIRATORY TIME: NORMAL
FEF 25-75% %PRED-PRE: NORMAL
FEF 25-75% PRED: NORMAL
FEF 25-75%-PRE: NORMAL
FEV1 %PRED-PRE: 77 %
FEV1 PRED: NORMAL
FEV1/FVC %PRED-PRE: NORMAL
FEV1/FVC PRED: NORMAL
FEV1/FVC: 77 %
FEV1: 2.08 L
FVC %PRED-PRE: 77 %
FVC PRED: NORMAL
FVC: 2.7 L
PEF %PRED-PRE: NORMAL
PEF PRED: NORMAL
PEF-PRE: NORMAL

## 2023-08-15 PROCEDURE — 3074F SYST BP LT 130 MM HG: CPT | Performed by: NURSE PRACTITIONER

## 2023-08-15 PROCEDURE — 94010 BREATHING CAPACITY TEST: CPT | Performed by: INTERNAL MEDICINE

## 2023-08-15 PROCEDURE — 3078F DIAST BP <80 MM HG: CPT | Performed by: NURSE PRACTITIONER

## 2023-08-15 PROCEDURE — 99214 OFFICE O/P EST MOD 30 MIN: CPT | Performed by: NURSE PRACTITIONER

## 2023-08-15 RX ORDER — ALBUTEROL SULFATE 90 UG/1
2 AEROSOL, METERED RESPIRATORY (INHALATION) 4 TIMES DAILY PRN
Qty: 18 G | Refills: 5 | Status: SHIPPED | OUTPATIENT
Start: 2023-08-15

## 2023-08-15 RX ORDER — BUDESONIDE AND FORMOTEROL FUMARATE DIHYDRATE 160; 4.5 UG/1; UG/1
2 AEROSOL RESPIRATORY (INHALATION) 2 TIMES DAILY
Qty: 1 EACH | Refills: 11 | Status: SHIPPED | OUTPATIENT
Start: 2023-08-15

## 2023-08-15 ASSESSMENT — PULMONARY FUNCTION TESTS
FEV1: 2.08
FEV1_PERCENT_PREDICTED_PRE: 77
FVC: 2.70
FVC_PERCENT_PREDICTED_PRE: 77
FEV1/FVC: 77

## 2023-08-15 NOTE — PROGRESS NOTES
Name:  Madolyn Duverney  YOB: 1960   MRN: [id number]      Office Visit: 8/15/2023        ASSESSMENT AND PLAN:  (Medical Decision Making)    Impression: 59 y.o. female seen today as a new patient in pulmonary. Current smoker and history of asthma no inhalers at this point. 1. Nicotine dependence with current use  -- Continues weaning and is at times not smoking and other times smoking up to 5 a day. Total smoking cessation is advised. Patient's tobacco use confirmed as documented in the medical record. Discussed various smoking cessation products including pills, patches, inhaler, gum, weaning self off, \"cold turkey\", and smoking cessation classes. Discussed also with patient disease risk of ongoing smoking including CVA, lung cancer, and heart disease. At this point, patient's commitment to quitting is high. 4 minutes were spent counseling patient regarding tobacco cessation. --Spirometry today shows more restriction on flow-volume loop than obstruction. -- Discussed CT scan for lung cancer screening, but unfortunately is having issues with her insurance so we will hold off on this. 2. History of asthma  --Never really needed an inhaler and now smoking with an approximate 40-pack-year history. -- No obstruction on spirometry, more restriction  --With wheezing and use of albuterol, will try Symbicort  --encouraged allergy tab daily    No orders of the defined types were placed in this encounter. No orders of the defined types were placed in this encounter. Dulce Nash, APRN - CNP    No specialty comments available. Collaborating physician is Kalyani Pedroza MD      _________________________________________________________________________    HISTORY OF PRESENT ILLNESS:    Ms. Krista Smith is a 59 y.o. female who is seen at [facility] Pulmonary today for  Follow-up and Other (Pleural effusion)  Ms. Babita Peace is here today as a new patient/hospital follow-up.   She is status post CABG

## 2023-09-11 ENCOUNTER — OFFICE VISIT (OUTPATIENT)
Dept: VASCULAR SURGERY | Age: 63
End: 2023-09-11

## 2023-09-11 VITALS
HEIGHT: 66 IN | DIASTOLIC BLOOD PRESSURE: 71 MMHG | SYSTOLIC BLOOD PRESSURE: 127 MMHG | HEART RATE: 65 BPM | OXYGEN SATURATION: 97 % | BODY MASS INDEX: 25.88 KG/M2 | WEIGHT: 161 LBS

## 2023-09-11 DIAGNOSIS — I65.23 BILATERAL CAROTID ARTERY STENOSIS: Primary | ICD-10-CM

## 2023-09-11 PROCEDURE — 3078F DIAST BP <80 MM HG: CPT | Performed by: STUDENT IN AN ORGANIZED HEALTH CARE EDUCATION/TRAINING PROGRAM

## 2023-09-11 PROCEDURE — 3074F SYST BP LT 130 MM HG: CPT | Performed by: STUDENT IN AN ORGANIZED HEALTH CARE EDUCATION/TRAINING PROGRAM

## 2023-09-11 PROCEDURE — 99214 OFFICE O/P EST MOD 30 MIN: CPT | Performed by: STUDENT IN AN ORGANIZED HEALTH CARE EDUCATION/TRAINING PROGRAM

## 2023-09-11 NOTE — PROGRESS NOTES
[address] [facility]. [address] [place] [address] Dr FAX: [phone]    Danielle Moctezuma  : 1960      Reason for visit: Right carotid artery stenosis     Chief Complaint: 59 y.o. female with right carotid stenosis seen on work-up for CABG. CABG x 4 3/6/23 by Dr. Barbara Mccann. Patient denies slurred speech, change in vision, unilateral weakness. Still smoking. Plan:   Carotid artery stenosis: Smoking cessation. Continue ASA 81mg daily, and Crestor 40mg, repeat carotid DUS in 1yr   Hyperlipidemia: Continue Crestor 40mg    Level 4 and Progression of chronic illness    Ambulatory status: Without claudication    Physical Examination:   Vitals:    23 1011   BP: 127/71   Pulse:    SpO2:          General:Well-developed. No distress. HENT: AT  CV: Regular rhythm. LUNG: Effort normal and breath sounds normal. No respiratory distress. Abdominal: Soft. Bowel sounds are normal. she exhibits no distension. There is no tenderness. There is no guarding. No hernia. Extremities:no wounds, motor and sensation intact  Vascular: Radial:, L, 2+ , R, 2+     Constitutional:   Negative for fevers and unexplained weight loss. Eyes:   Negative for visual disturbance. ENT:   Negative for significant hearing loss and tinnitus. Respiratory:   Negative for hemoptysis. Cardiovascular:   Negative except as noted in HPI. Gastrointestinal:   Negative for melena and abdominal pain. Genitourinary:   Negative for hematuria, renal stones. Integumentary:   Negative for rash or non-healing wounds  Hematologic/Lymphatic:   Negative for excessive bleeding hx or clotting disorder. Musculoskeletal:  Negative for active, unexplained/severe joint pain. Neurological:   Negative for stroke. Behavioral/Psych:   Negative for suicidal ideations. Endocrine:   Negative for uncontrolled diabetic symptoms including polyuria, polydipsia and poor wound healing.      Complexity of illness:  HTN, HLD, and

## 2023-11-28 NOTE — PROGRESS NOTES
[facility] CARDIOLOGY  [address] [facility], [facility], [address]  PHONE: [phone]      23    NAME:  Ke Giraldo  : 1960  MRN: [id number]         SUBJECTIVE:   Ke Giraldo is a 59 y.o. female seen for a follow up visit regarding the following:     Chief Complaint   Patient presents with    Hypertension    Follow-up    Coronary Artery Disease            HPI:  Follow up  Hypertension, Follow-up, and Coronary Artery Disease   . Patient with past medical history of tobacco abuse, dyslipidemia and hypothyroidism, probably familial dyslipidemia, carotid artery stenosis and  CABG, intermittent hypotension. Still smoking, struggles with desire to quit and anxiety. She's not been back to work despite surgery 8 months ago. Needs to get back, has no restrictions. PAST CARDIAC HISTORY:  Mar 2023       CABG - LIMA-LAD, SVG-Dx, SVG-OM1, SVG-OM3  (occluded small RCA)       Echo - normal SF, DF, RVSP 21       Carotid doppler 50-69% DIEGO , Distal RCCA: 48 cm/s DIEGO 152 cm/s. Ratio = 3.2.  -discharged with vascular follow up  Sep 2023       Carotid doppler - heterogeneous plaque with < 50% stenosis               Key CAD CHF Meds            rosuvastatin (CRESTOR) 40 MG tablet (Taking)    Sig - Route: Take 1 tablet by mouth nightly - Oral          Key Antihyperglycemic Medications       Patient is on no antihyperglycemic meds. Past Medical History, Past Surgical History, Family history, Social History, and Medications were all reviewed with the patient today and updated as necessary. Prior to Admission medications    Medication Sig Start Date End Date Taking?  Authorizing Provider   albuterol sulfate HFA (VENTOLIN HFA) 108 (90 Base) MCG/ACT inhaler Inhale 2 puffs into the lungs 4 times daily as needed for Wheezing 8/15/23  Yes Rell Clemons APRN - CNP   SYMBICORT 160-4.5 MCG/ACT AERO Inhale 2 puffs into the lungs 2 times daily 8/15/23  Yes EMILY Trinh -

## 2023-11-29 ENCOUNTER — OFFICE VISIT (OUTPATIENT)
Age: 63
End: 2023-11-29

## 2023-11-29 VITALS
HEART RATE: 69 BPM | WEIGHT: 163.6 LBS | BODY MASS INDEX: 26.41 KG/M2 | DIASTOLIC BLOOD PRESSURE: 70 MMHG | SYSTOLIC BLOOD PRESSURE: 124 MMHG

## 2023-11-29 DIAGNOSIS — I25.10 CAD, MULTIPLE VESSEL: Primary | ICD-10-CM

## 2023-11-29 DIAGNOSIS — E78.5 DYSLIPIDEMIA: ICD-10-CM

## 2023-11-29 DIAGNOSIS — Z72.0 TOBACCO USE: ICD-10-CM

## 2023-11-29 DIAGNOSIS — Z95.1 S/P CABG X 4: ICD-10-CM

## 2023-11-29 DIAGNOSIS — I10 ESSENTIAL HYPERTENSION: ICD-10-CM

## 2023-11-29 LAB
CHOLEST SERPL-MCNC: 270 MG/DL
HDLC SERPL-MCNC: 48 MG/DL (ref 40–60)
HDLC SERPL: 5.6
LDLC SERPL CALC-MCNC: 179.4 MG/DL
TRIGL SERPL-MCNC: 213 MG/DL (ref 35–150)
VLDLC SERPL CALC-MCNC: 42.6 MG/DL (ref 6–23)

## 2023-11-29 PROCEDURE — 3078F DIAST BP <80 MM HG: CPT | Performed by: INTERNAL MEDICINE

## 2023-11-29 PROCEDURE — 3074F SYST BP LT 130 MM HG: CPT | Performed by: INTERNAL MEDICINE

## 2023-11-29 PROCEDURE — 99214 OFFICE O/P EST MOD 30 MIN: CPT | Performed by: INTERNAL MEDICINE

## 2023-11-29 NOTE — PATIENT INSTRUCTIONS
Chief complaint:   Chief Complaint   Patient presents with   • Office Visit     EKG       Vitals:  Visit Vitals  /64 (BP Location: LUE - Left upper extremity, Patient Position: Sitting, Cuff Size: Regular)   Pulse 85   Temp 97 °F (36.1 °C)   Ht 5' 10\" (1.778 m)   Wt 109.8 kg (242 lb)   SpO2 94%   BMI 34.72 kg/m²       HISTORY OF PRESENT ILLNESS     65 year old male returns today to discuss recent CT chest results. Patient has history of smoking and known history of COPD. CT did not show any concerning nodules but did show severe coronary artery calcification as well as a density thought to be a cyst in the right kidney. Patient has not had chest pain but does have exertional dyspnea as well as tires now more easily with activity that he previously performed. There is family history of heart disease and he has personal risk factors of male gender over age 40, hyperlipidemia, and obesity. No heart workup previously performed.       Other significant problems:  Patient Active Problem List    Diagnosis Date Noted   • Obesity (BMI 30-39.9) 03/03/2021     Priority: Low   • Generalized OA 03/03/2021     Priority: Low   • Centrilobular emphysema (CMS/HCC) 03/03/2021     Priority: Low   • Major depressive disorder with single episode, in full remission (CMS/HCC)      Priority: Low   • Hyperlipidemia      Priority: Low       PAST MEDICAL, FAMILY AND SOCIAL HISTORY     Medications:  Current Outpatient Medications   Medication   • albuterol 108 (90 Base) MCG/ACT inhaler   • atorvastatin (LIPITOR) 40 MG tablet   • meloxicam (MOBIC) 15 MG tablet   • Multiple Vitamins-Minerals (MULTIVITAMIN PO)   • aspirin 81 MG tablet   • sertraline (ZOLOFT) 50 MG tablet     No current facility-administered medications for this visit.       Allergies:  ALLERGIES:  No Known Allergies    Past Medical  History/Surgeries:  Past Medical History:   Diagnosis Date   • Depressive disorder    • Hyperlipidemia        Past Surgical History:  Patient Education        Quitting Tobacco: Care Instructions  Quitting tobacco is much harder than simply changing a habit. Nicotine cravings make it hard to quit, but you can do it. Your doctor will help you set up the plan that best meets your needs. You will miss the nicotine at first. You may feel short-tempered and grumpy. You may have trouble sleeping or thinking clearly. The urge to use tobacco may continue for a time. Combining tools can raise your chances of success. You can use medicine along with counseling. And you can join a quit-tobacco program, such as the American Lung Association's Freedom from Smoking program.     Get support. Reach out to family and friends, and find a counselor to help you quit. Join a support group, such as Nicotine Anonymous. Go to www.smokefree. gov to sign up for text messaging support. Talk to your doctor or pharmacist about medicines that can help you quit. Medicines can help with cravings and withdrawal symptoms. There are several over-the-counter choices, such as nicotine patches, gum, and lozenges. After you quit, do not use tobacco again, not even once. Get rid of all tobacco products and anything that reminds you of tobacco, such as ashtrays. Avoid things that make you reach for tobacco.  Change your daily routine. Take a different route to work, or eat a meal in a different place. Try to cut down on stress. Find ways to calm yourself, such as taking a hot bath or doing deep breathing exercises. Eat a healthy diet, and get regular exercise. Having healthy habits may help you quit using tobacco.     Don't give up on quitting if you use tobacco again. Most people quit and restart a few times before they quit for good. Follow-up care is a key part of your treatment and safety. Be sure to make and go to all appointments, and call your doctor if you are having problems.  It's also a good idea to know your test results and keep a list of the   Procedure Laterality Date   • Colonoscopy  2015    return 10 years   • Rotator cuff repair  2005    left    • Shoulder surgery      bone spurs removed from right shoulder   • Spine surgery         Family History:  Family History   Problem Relation Age of Onset   • Heart Mother         CABG   • Tuberculosis Mother    • Heart Father    • Cancer Father 90        prostate cancer    • Heart disease Sister    • Patient is unaware of any medical problems Sister    • Patient is unaware of any medical problems Sister        Social History:  Social History     Tobacco Use   • Smoking status: Former Smoker     Packs/day: 2.00     Years: 40.00     Pack years: 80.00     Types: Cigarettes     Start date: 1973     Quit date: 3/4/2016     Years since quittin.0   • Smokeless tobacco: Never Used   • Tobacco comment: smoke hx per 19 md note   Substance Use Topics   • Alcohol use: No       REVIEW OF SYSTEMS     Review of Systems   Constitutional: Positive for fatigue.   Respiratory: Positive for shortness of breath. Negative for cough.    Cardiovascular: Negative for chest pain, palpitations and leg swelling.       PHYSICAL EXAM     Physical Exam  Constitutional:       General: He is not in acute distress.     Appearance: Normal appearance. He is not ill-appearing.   Neurological:      General: No focal deficit present.      Mental Status: He is alert and oriented to person, place, and time.   Psychiatric:         Mood and Affect: Mood normal.         Behavior: Behavior normal.         ASSESSMENT/PLAN     1. Coronary artery calcification seen on CT scan    2. Mixed hyperlipidemia    3. Exertional dyspnea    4. Cyst of right kidney    5. Pre-procedural laboratory examination    6. Centrilobular emphysema (CMS/HCC)      Majority of the visit today spent in education and counseling. Will obtain renal ultrasound to better evaluate the right sided density per radiologist recommendations. Will also order stress  test in light of nonspecific symptoms of dyspnea and fatigue with multiple cardiac risk factors and severe coronary artery calcification on CT imaging.

## 2023-11-30 NOTE — RESULT ENCOUNTER NOTE
These results would suggest she may not be taking her rosuvastatin regularly.  If she IS taking it, then we need to please add Zetia, check another panel in a month, then get her approved for PCSK9i therapy since there's no way that will get her to target.  If she's not taking, begin doing so and check lipids 3 months. (Did drop from a high of 225 to 179 so she may be taking it and just have familial dyslipidemia). Thanks

## 2023-12-01 ENCOUNTER — TELEPHONE (OUTPATIENT)
Age: 63
End: 2023-12-01

## 2023-12-01 NOTE — TELEPHONE ENCOUNTER
----- Message from Atul Phillips MD sent at 11/30/2023  7:31 AM EST -----  These results would suggest she may not be taking her rosuvastatin regularly. If she IS taking it, then we need to please add Zetia, check another panel in a month, then get her approved for PCSK9i therapy since there's no way that will get her to target. If she's not taking, begin doing so and check lipids 3 months. (Did drop from a high of 225 to 179 so she may be taking it and just have familial dyslipidemia).  Thanks

## 2023-12-07 NOTE — TELEPHONE ENCOUNTER
Multiple attempts made to reach this patient, I left three voicemail's asking her to please give me a call back. As of this note, the patient has not returned my calls.

## 2024-05-28 NOTE — PROGRESS NOTES
[facility] CARDIOLOGY  [address] [place] [address], [address]  [place] [address]  PHONE: [phone]      24    NAME:  Juan To  : 1960  MRN: [id number]         SUBJECTIVE:   Juan To is a 64 y.o. female seen for a follow up visit regarding the following:     Chief Complaint   Patient presents with    6 Month Follow-Up    Coronary Artery Disease            HPI:  Follow up  6 Month Follow-Up and Coronary Artery Disease   .    Patient with past medical history of tobacco abuse, dyslipidemia and hypothyroidism, probably familial dyslipidemia, carotid artery stenosis and  CABG, intermittent hypotension.  Still smoking, struggles with desire to quit and anxiety.     She complains of vertigo the last few days, ears ringing, chest wall tenderness to touch.  Active on her property, 20 chickens, just build a shed.  10 steps up and down her house multiple times a day.  Drinks a couple of cups of coffee.     I erroneously indicated to her last visit that medical therapy in the absence of desire to quit would likely provide no benefit, but subsequent research shows that indeed it could.            How many cigarettes do you smoke daily?  0 points: <=10    How soon after rising do you have your first cigarette?  2 points: 6-30 minutes    0-2 points = low nicotine dependence  3-4 points = moderate nicotine dependence  5-6 points = high nicotine dependence    Ready to commit to quit in the next 30 days?  yes - willing to try wellbutrin    Prior attempts to quit      Rash patches.  Tried vape before, not currently    Advised and offered treatment     yes - wellbutrin with nicotine gum    Follow up if treatment accepted    Call or OV in 2-4 weeks    Provide resources [id number]              Text \"QUIT\" to [phone]                PAST CARDIAC HISTORY:  Mar 2023       CABG - LIMA-LAD, SVG-Dx, SVG-OM1, SVG-OM3  (occluded small RCA)       Echo - normal SF, DF, RVSP 21       Carotid doppler 50-69% DIEGO

## 2024-05-29 ENCOUNTER — OFFICE VISIT (OUTPATIENT)
Age: 64
End: 2024-05-29

## 2024-05-29 VITALS
SYSTOLIC BLOOD PRESSURE: 118 MMHG | BODY MASS INDEX: 26.84 KG/M2 | HEIGHT: 66 IN | HEART RATE: 62 BPM | DIASTOLIC BLOOD PRESSURE: 62 MMHG | WEIGHT: 167 LBS

## 2024-05-29 DIAGNOSIS — E03.9 HYPOTHYROIDISM, UNSPECIFIED TYPE: ICD-10-CM

## 2024-05-29 DIAGNOSIS — F17.210 CIGARETTE SMOKER: Chronic | ICD-10-CM

## 2024-05-29 DIAGNOSIS — I25.110 ATHEROSCLEROSIS OF NATIVE CORONARY ARTERY OF NATIVE HEART WITH UNSTABLE ANGINA PECTORIS (HCC): Primary | ICD-10-CM

## 2024-05-29 DIAGNOSIS — E78.01 FAMILIAL HYPERCHOLESTEREMIA: Chronic | ICD-10-CM

## 2024-05-29 DIAGNOSIS — Z95.1 S/P CABG X 4: ICD-10-CM

## 2024-05-29 PROCEDURE — 3074F SYST BP LT 130 MM HG: CPT | Performed by: INTERNAL MEDICINE

## 2024-05-29 PROCEDURE — 99214 OFFICE O/P EST MOD 30 MIN: CPT | Performed by: INTERNAL MEDICINE

## 2024-05-29 PROCEDURE — 3078F DIAST BP <80 MM HG: CPT | Performed by: INTERNAL MEDICINE

## 2024-05-29 PROCEDURE — 93000 ELECTROCARDIOGRAM COMPLETE: CPT | Performed by: INTERNAL MEDICINE

## 2024-05-29 RX ORDER — EZETIMIBE 10 MG/1
10 TABLET ORAL DAILY
Qty: 90 TABLET | Refills: 3 | Status: SHIPPED | OUTPATIENT
Start: 2024-05-29

## 2024-05-29 RX ORDER — LEVOTHYROXINE SODIUM 0.12 MG/1
125 TABLET ORAL DAILY
Qty: 30 TABLET | Refills: 0 | Status: SHIPPED | OUTPATIENT
Start: 2024-05-29

## 2024-05-29 RX ORDER — BUPROPION HYDROCHLORIDE 150 MG/1
TABLET, EXTENDED RELEASE ORAL
Qty: 180 TABLET | Refills: 1 | Status: SHIPPED | OUTPATIENT
Start: 2024-05-29 | End: 2024-09-08

## 2024-05-29 ASSESSMENT — ENCOUNTER SYMPTOMS: SHORTNESS OF BREATH: 1

## 2024-06-03 RX ORDER — ROSUVASTATIN CALCIUM 40 MG/1
40 TABLET, COATED ORAL NIGHTLY
Qty: 90 TABLET | Refills: 3 | Status: SHIPPED | OUTPATIENT
Start: 2024-06-03

## 2024-06-03 NOTE — TELEPHONE ENCOUNTER
Requested Prescriptions     Pending Prescriptions Disp Refills    rosuvastatin (CRESTOR) 40 MG tablet [Pharmacy Med Name: Rosuvastatin Calcium 40 MG Oral Tablet] 90 tablet 3     Sig: Take 1 tablet by mouth nightly     Rx verified last ov 5/29/24

## 2024-09-09 ENCOUNTER — OFFICE VISIT (OUTPATIENT)
Dept: VASCULAR SURGERY | Age: 64
End: 2024-09-09

## 2024-09-09 VITALS
HEART RATE: 62 BPM | BODY MASS INDEX: 24.43 KG/M2 | OXYGEN SATURATION: 95 % | DIASTOLIC BLOOD PRESSURE: 80 MMHG | SYSTOLIC BLOOD PRESSURE: 145 MMHG | WEIGHT: 152 LBS | HEIGHT: 66 IN

## 2024-09-09 DIAGNOSIS — I65.23 BILATERAL CAROTID ARTERY STENOSIS: Primary | ICD-10-CM

## 2024-09-09 PROCEDURE — 3078F DIAST BP <80 MM HG: CPT | Performed by: STUDENT IN AN ORGANIZED HEALTH CARE EDUCATION/TRAINING PROGRAM

## 2024-09-09 PROCEDURE — 99213 OFFICE O/P EST LOW 20 MIN: CPT | Performed by: STUDENT IN AN ORGANIZED HEALTH CARE EDUCATION/TRAINING PROGRAM

## 2024-09-09 PROCEDURE — 3075F SYST BP GE 130 - 139MM HG: CPT | Performed by: STUDENT IN AN ORGANIZED HEALTH CARE EDUCATION/TRAINING PROGRAM

## 2024-12-01 NOTE — PROGRESS NOTES
[facility] CARDIOLOGY  [address] [place] [address], [address]  [place] [address]  PHONE: [phone]      24    NAME:  Juan To  : 1960  MRN: [id number]         SUBJECTIVE:   Juan To is a 64 y.o. female seen for a follow up visit regarding the following:     Chief Complaint   Patient presents with    Coronary Artery Disease            HPI:  Follow up  Coronary Artery Disease   .    Patient with past medical history of tobacco abuse, dyslipidemia and hypothyroidism, probably familial dyslipidemia, carotid artery stenosis and  CABG, intermittent hypotension. Hasn't gotten her lipid panel, hasn't seen her pcp.  At her last visti, I agreed to a one-time 30 day fill of synthroid, future refills will have to come either from a  new primary care or urgent care, she is asking for them again today, I declined as was made previously clear.        She gets no exercise, she attributes it to leg weakness and morning vertigo.  Feels unable to exercise because [place].  Continues to complain of fatigue and chest wall pain since her CABG.  Smoking 3 cigarettes a day, no concrete plan to quit but says she is \"trying\", we previously attempted wellbutrin and NRT but she reports that nothing changed.                          PAST CARDIAC HISTORY:  Mar 2023       CABG - LIMA-LAD, SVG-Dx, SVG-OM1, SVG-OM3  (occluded small RCA)       Echo - normal SF, DF, RVSP 21       Carotid doppler 50-69% DIEGO , Distal RCCA: 48 cm/s DIEGO 152 cm/s. Ratio = 3.2.  -discharged with vascular follow up  Sep 2023       Carotid doppler - heterogeneous plaque with < 50% stenosis                  Cardiac Medications       HMG CoA Reductase Inhibitors       rosuvastatin (CRESTOR) 40 MG tablet Take 1 tablet by mouth nightly       Intestinal Cholesterol Absorption Inhibitors       ezetimibe (ZETIA) 10 MG tablet Take 1 tablet by mouth daily       Salicylates       aspirin 81 MG EC tablet Take 1 tablet by mouth daily

## 2024-12-02 ENCOUNTER — OFFICE VISIT (OUTPATIENT)
Age: 64
End: 2024-12-02

## 2024-12-02 VITALS
DIASTOLIC BLOOD PRESSURE: 76 MMHG | SYSTOLIC BLOOD PRESSURE: 120 MMHG | HEART RATE: 60 BPM | WEIGHT: 162 LBS | HEIGHT: 66 IN | BODY MASS INDEX: 26.03 KG/M2

## 2024-12-02 DIAGNOSIS — E78.5 DYSLIPIDEMIA: ICD-10-CM

## 2024-12-02 DIAGNOSIS — I73.9 PERIPHERAL ARTERY DISEASE (HCC): ICD-10-CM

## 2024-12-02 DIAGNOSIS — I25.10 CAD, MULTIPLE VESSEL: Primary | ICD-10-CM

## 2024-12-02 DIAGNOSIS — F17.210 CIGARETTE SMOKER: Chronic | ICD-10-CM

## 2024-12-02 DIAGNOSIS — Z95.1 S/P CABG X 4: ICD-10-CM

## 2024-12-02 LAB
CHOLEST SERPL-MCNC: 193 MG/DL (ref 0–200)
HDLC SERPL-MCNC: 52 MG/DL (ref 40–60)
HDLC SERPL: 3.7 (ref 0–5)
LDLC SERPL CALC-MCNC: 111 MG/DL (ref 0–100)
TRIGL SERPL-MCNC: 152 MG/DL (ref 0–150)
VLDLC SERPL CALC-MCNC: 30 MG/DL (ref 6–23)

## 2024-12-02 PROCEDURE — 3074F SYST BP LT 130 MM HG: CPT | Performed by: INTERNAL MEDICINE

## 2024-12-02 PROCEDURE — 99214 OFFICE O/P EST MOD 30 MIN: CPT | Performed by: INTERNAL MEDICINE

## 2024-12-02 PROCEDURE — 3078F DIAST BP <80 MM HG: CPT | Performed by: INTERNAL MEDICINE

## 2024-12-02 RX ORDER — ROSUVASTATIN CALCIUM 40 MG/1
40 TABLET, COATED ORAL NIGHTLY
Qty: 90 TABLET | Refills: 3 | Status: SHIPPED | OUTPATIENT
Start: 2024-12-02

## 2024-12-02 RX ORDER — EZETIMIBE 10 MG/1
10 TABLET ORAL DAILY
Qty: 90 TABLET | Refills: 3 | Status: SHIPPED | OUTPATIENT
Start: 2024-12-02

## 2024-12-03 ENCOUNTER — TELEPHONE (OUTPATIENT)
Age: 64
End: 2024-12-03

## 2024-12-03 NOTE — TELEPHONE ENCOUNTER
Ivelisse Dunn MD  You7 minutes ago (3:35 PM)     BM  See if she can apply for patient assistance please. Thanks. Of course as always, regular physical activity is free and a healthy diet will help as well.     Called and spoke with patient gave her the number for [employer] to see about getting help paying for medications. Advised to follow healthy diet and exercise as well. She is going to call back and let us know about [employer].

## 2024-12-03 NOTE — TELEPHONE ENCOUNTER
----- Message from Dr. Ivelisse Dunn MD sent at 12/3/2024  7:13 AM EST -----  Lipids are better but still not at target on 40 mg rosuvastatin and 10 mg Zetia.  Will need to see if we can get approval for alternative, I'd recommend Nexlizet (already has the Zetia in it) added to the rosuvastatin.

## 2024-12-03 NOTE — RESULT ENCOUNTER NOTE
Lipids are better but still not at target on 40 mg rosuvastatin and 10 mg Zetia.  Will need to see if we can get approval for alternative, I'd recommend Nexlizet (already has the Zetia in it) added to the rosuvastatin.

## 2024-12-03 NOTE — TELEPHONE ENCOUNTER
Called and spoke with patient she stated she is self pay. She does not think she would be able to afford it at this time. She asked if there anything that would be more affordable.

## 2024-12-09 ENCOUNTER — TELEPHONE (OUTPATIENT)
Age: 64
End: 2024-12-09

## 2024-12-09 NOTE — TELEPHONE ENCOUNTER
Pt called wanted to let Dr Aguilar know that she called about getting help with meds from Pt access network [facility] and she is on a waiting period and  said she will call back once  she has been notified again

## 2025-01-27 ENCOUNTER — HOSPITAL ENCOUNTER (EMERGENCY)
Age: 65
Discharge: HOME | End: 2025-01-27

## 2025-01-27 VITALS
TEMPERATURE: 98.4 F | OXYGEN SATURATION: 99 % | HEART RATE: 70 BPM | BODY MASS INDEX: 26.84 KG/M2 | WEIGHT: 167 LBS | HEIGHT: 66 IN | RESPIRATION RATE: 18 BRPM | DIASTOLIC BLOOD PRESSURE: 73 MMHG | SYSTOLIC BLOOD PRESSURE: 135 MMHG

## 2025-01-27 DIAGNOSIS — K08.89 PAIN, DENTAL: Primary | ICD-10-CM

## 2025-01-27 DIAGNOSIS — K04.7 DENTAL INFECTION: ICD-10-CM

## 2025-01-27 PROCEDURE — 99283 EMERGENCY DEPT VISIT LOW MDM: CPT

## 2025-01-27 RX ORDER — HYDROCODONE BITARTRATE AND ACETAMINOPHEN 5; 325 MG/1; MG/1
1 TABLET ORAL EVERY 6 HOURS PRN
Qty: 10 TABLET | Refills: 0 | Status: SHIPPED | OUTPATIENT
Start: 2025-01-27 | End: 2025-01-30

## 2025-01-27 RX ORDER — CLINDAMYCIN HYDROCHLORIDE 300 MG/1
300 CAPSULE ORAL 3 TIMES DAILY
Qty: 21 CAPSULE | Refills: 0 | Status: SHIPPED | OUTPATIENT
Start: 2025-01-27 | End: 2025-02-03

## 2025-01-27 ASSESSMENT — PAIN - FUNCTIONAL ASSESSMENT: PAIN_FUNCTIONAL_ASSESSMENT: 0-10

## 2025-01-27 ASSESSMENT — PAIN SCALES - GENERAL: PAINLEVEL_OUTOF10: 5

## 2025-01-27 NOTE — ED PROVIDER NOTES
Emergency Department Provider Note       PCP: None, None   Age: 64 y.o.   Sex: female     DISPOSITION Decision To Discharge 01/27/2025 02:35:33 PM    ICD-10-CM    1. Pain, dental  K08.89       2. Dental infection  K04.7           Medical Decision Making     Patient presents with less than 1 day history of dental pain, mild erythema and swelling underneath left eye.  Appears consistent with dental infection, mild cellulitis.  She is well-appearing.  Her vital signs are stable.  She is afebrile, no tachycardia or tachypnea.  On exam, she has poor dentition.  Has multiple cracked teeth.  Does have tenderness along her gumline above cracked teeth on the left upper side of jaw.  No trismus, tongue elevation, difficulty breathing.  No concern for orbital cellulitis, EOMs intact, no eye pain, no eye swelling, no vision changes or erythema.  Will treat outpatient with antibiotics.  Patient encouraged to follow-up with dentist ASAP.  Dental clinic list provided.  Strict return precautions discussed.  Patient verbalized understanding and is agreeable for discharge home.     1 or more acute illnesses that pose a threat to life or bodily function.   Prescription drug management performed.  Patient was discharged risks and benefits of hospitalization were considered.  Shared medical decision making was utilized in creating the patients health plan today.  I independently ordered and reviewed each unique test.                         History     Patient is a 64-year-old female with past medical history significant for HTN, CAD, CABG, tobacco abuse who presents today to the ER with chief complaint of dental pain.  Patient states she woke up this morning with dental pain on the upper left side of her jaw, has associated cheek swelling and redness.  She reports the swelling improved with the application of ice.  She has not taken anything for pain.  She denies any fever, chills, eye pain, eye swelling, eye redness, shortness of

## 2025-01-27 NOTE — ED TRIAGE NOTES
Pt came into ED for dental pain and possible abscess, states she woke up to swelling to L side of face, has some broken teeth

## 2025-01-27 NOTE — DISCHARGE INSTRUCTIONS
You were diagnosed with a dental infection today.  I will give you prescription for antibiotics to take.  Please make sure you take all of them as prescribed due to if you start to feel better.  May continue to use Tylenol and ice on your face to improve swelling. I will give you an additional pain medication to use at night-it contains tylenol so please do not take tylenol with it. Please make sure you follow-up with a dentist ASAP.  See resources listed below.  Please return to the ER immediately for reevaluation if you experience any worsening of facial swelling, fevers, shortness of breath, difficulty swallowing.    Dental Services    [facility]  Please call [phone] as soon as possible to set up close follow-up.       Address: [address] [place] [id number]  Hours Monday - Friday 10am - 6:30pm Weekends on call for emergencies     Listed below are free or low-cost options.    [facility] [address] [place] [address] [id number] [phone]  Tuesday and Thursday- registration starts at 10am. After registering you are given a time to return  Provides free x-rays, extractions, treatment of infection, and dental hygeine.   Cannot have medicare, medicaid or other medical insurance coverage  Bring proof of [facility]** residency (power bill, for example), Social Security Number and household income documents (including food stamps) as well as any current medications.    (**if you do not live in [facility], contact the free clinic in your home county for the availability of dental services)    [address] [address] [phone]  Monday and Wednesday 8a-5p  Tuesday and Thursday    8a-7p  Friday 12-5p  Provides Adult and Childrens services. X-rays, extractions, treatment of infection, restorative care  Accepts medicaid, private insurance, self-pay. Fees are on a

## 2025-06-02 NOTE — PROGRESS NOTES
[facility] CARDIOLOGY  [address] [place] [address], [address]  [place] [address]  PHONE: [phone]      25    NAME:  Juan To  : 1960  MRN: [id number]         SUBJECTIVE:   Juan To is a 65 y.o. female seen for a follow up visit regarding the following:     Chief Complaint   Patient presents with    Coronary Artery Disease    Hypertension            HPI:  Follow up  Coronary Artery Disease and Hypertension   .    Patient with past medical history of tobacco abuse, dyslipidemia and hypothyroidism, probably familial dyslipidemia, carotid artery stenosis and  CABG, intermittent hypotension, intermittent compliance with primary care follow up, sedentary lifestyle.  She complains of vertigo when she first sits up in the morning, lasts 3-4 minutes, she does have typical allergy symptoms.        Complaint #2 - legs feel tired when walking uphill, left worse than right, feels like a cramp, resolves within a few moments of rest.  No nonhealing wounds.      Complaint #3 - frustrated she's not losing weight.  She cooks at home, using mostly fresh foods, has her own garden and her own chickens.  Avoids bread, limits pasta, does eat a fair amount of white potatoes.  Drinks water.  Uses an artificial sweetener in her water.  No alcohol.  Active around the house and yard but no structured exercise.      Smoking 1 cigarette a few days a week.  Her partner smokes.        She is meeting Dr Campos as her new primary care tomorrow.            PAST CARDIAC HISTORY:  Mar 2023       CABG - LIMA-LAD, SVG-Dx, SVG-OM1, SVG-OM3  (occluded small RCA)       Echo - normal SF, DF, RVSP 21       Carotid doppler 50-69% DIEGO , Distal RCCA: 48 cm/s DIEGO 152 cm/s. Ratio = 3.2.  -discharged with vascular follow up  Sep 2023       Carotid doppler - heterogeneous plaque with < 50% stenosis              Cardiac Medications       HMG CoA Reductase Inhibitors       rosuvastatin (CRESTOR) 40 MG tablet Take 1 tablet by mouth nightly

## 2025-06-03 ENCOUNTER — OFFICE VISIT (OUTPATIENT)
Age: 65
End: 2025-06-03

## 2025-06-03 VITALS
HEART RATE: 76 BPM | WEIGHT: 165.5 LBS | DIASTOLIC BLOOD PRESSURE: 76 MMHG | BODY MASS INDEX: 26.6 KG/M2 | HEIGHT: 66 IN | SYSTOLIC BLOOD PRESSURE: 122 MMHG

## 2025-06-03 DIAGNOSIS — Z95.1 S/P CABG X 4: ICD-10-CM

## 2025-06-03 DIAGNOSIS — E78.01 FAMILIAL HYPERCHOLESTEREMIA: Chronic | ICD-10-CM

## 2025-06-03 DIAGNOSIS — I73.9 CLAUDICATION: ICD-10-CM

## 2025-06-03 DIAGNOSIS — R42 DIZZINESS: ICD-10-CM

## 2025-06-03 DIAGNOSIS — I10 ESSENTIAL HYPERTENSION: Primary | ICD-10-CM

## 2025-06-03 PROCEDURE — 3074F SYST BP LT 130 MM HG: CPT | Performed by: INTERNAL MEDICINE

## 2025-06-03 PROCEDURE — 3078F DIAST BP <80 MM HG: CPT | Performed by: INTERNAL MEDICINE

## 2025-06-03 PROCEDURE — 99214 OFFICE O/P EST MOD 30 MIN: CPT | Performed by: INTERNAL MEDICINE

## 2025-06-03 SDOH — HEALTH STABILITY: PHYSICAL HEALTH: ON AVERAGE, HOW MANY DAYS PER WEEK DO YOU ENGAGE IN MODERATE TO STRENUOUS EXERCISE (LIKE A BRISK WALK)?: 3 DAYS

## 2025-06-03 SDOH — HEALTH STABILITY: PHYSICAL HEALTH: ON AVERAGE, HOW MANY MINUTES DO YOU ENGAGE IN EXERCISE AT THIS LEVEL?: 60 MIN

## 2025-06-03 ASSESSMENT — ENCOUNTER SYMPTOMS: SHORTNESS OF BREATH: 0

## 2025-06-04 ENCOUNTER — OFFICE VISIT (OUTPATIENT)
Dept: INTERNAL MEDICINE CLINIC | Facility: CLINIC | Age: 65
End: 2025-06-04

## 2025-06-04 VITALS
DIASTOLIC BLOOD PRESSURE: 70 MMHG | OXYGEN SATURATION: 97 % | WEIGHT: 162 LBS | HEART RATE: 60 BPM | SYSTOLIC BLOOD PRESSURE: 118 MMHG | BODY MASS INDEX: 26.03 KG/M2 | HEIGHT: 66 IN

## 2025-06-04 DIAGNOSIS — Z95.1 S/P CABG X 4: ICD-10-CM

## 2025-06-04 DIAGNOSIS — M25.512 ACUTE PAIN OF LEFT SHOULDER: ICD-10-CM

## 2025-06-04 DIAGNOSIS — J42 CHRONIC BRONCHITIS, UNSPECIFIED CHRONIC BRONCHITIS TYPE (HCC): ICD-10-CM

## 2025-06-04 DIAGNOSIS — F17.210 CIGARETTE SMOKER: Chronic | ICD-10-CM

## 2025-06-04 DIAGNOSIS — Z12.31 SCREENING MAMMOGRAM FOR BREAST CANCER: ICD-10-CM

## 2025-06-04 DIAGNOSIS — I25.10 CAD, MULTIPLE VESSEL: Primary | ICD-10-CM

## 2025-06-04 DIAGNOSIS — R73.03 PREDIABETES: ICD-10-CM

## 2025-06-04 DIAGNOSIS — Z78.0 POSTMENOPAUSAL: ICD-10-CM

## 2025-06-04 DIAGNOSIS — E03.9 HYPOTHYROIDISM, UNSPECIFIED TYPE: ICD-10-CM

## 2025-06-04 PROCEDURE — 99214 OFFICE O/P EST MOD 30 MIN: CPT | Performed by: INTERNAL MEDICINE

## 2025-06-04 PROCEDURE — 3078F DIAST BP <80 MM HG: CPT | Performed by: INTERNAL MEDICINE

## 2025-06-04 PROCEDURE — 3074F SYST BP LT 130 MM HG: CPT | Performed by: INTERNAL MEDICINE

## 2025-06-04 RX ORDER — LEVOTHYROXINE SODIUM 100 UG/1
100 TABLET ORAL DAILY
Qty: 90 TABLET | Refills: 1 | Status: SHIPPED | OUTPATIENT
Start: 2025-06-04

## 2025-06-04 RX ORDER — ALBUTEROL SULFATE 90 UG/1
2 INHALANT RESPIRATORY (INHALATION) 4 TIMES DAILY PRN
Qty: 18 G | Refills: 5 | Status: SHIPPED | OUTPATIENT
Start: 2025-06-04

## 2025-06-04 RX ORDER — LEVOTHYROXINE SODIUM 175 UG/1
175 TABLET ORAL DAILY
Qty: 90 TABLET | Refills: 1 | Status: SHIPPED | OUTPATIENT
Start: 2025-06-04 | End: 2025-06-04

## 2025-06-04 RX ORDER — BUDESONIDE AND FORMOTEROL FUMARATE DIHYDRATE 160; 4.5 UG/1; UG/1
2 AEROSOL RESPIRATORY (INHALATION) 2 TIMES DAILY
Qty: 1 EACH | Refills: 11 | Status: SHIPPED | OUTPATIENT
Start: 2025-06-04

## 2025-06-04 SDOH — ECONOMIC STABILITY: FOOD INSECURITY: WITHIN THE PAST 12 MONTHS, YOU WORRIED THAT YOUR FOOD WOULD RUN OUT BEFORE YOU GOT MONEY TO BUY MORE.: NEVER TRUE

## 2025-06-04 SDOH — ECONOMIC STABILITY: FOOD INSECURITY: WITHIN THE PAST 12 MONTHS, THE FOOD YOU BOUGHT JUST DIDN'T LAST AND YOU DIDN'T HAVE MONEY TO GET MORE.: NEVER TRUE

## 2025-06-04 ASSESSMENT — ENCOUNTER SYMPTOMS
ABDOMINAL PAIN: 0
ABDOMINAL DISTENTION: 0
CONSTIPATION: 0
CHEST TIGHTNESS: 0
COUGH: 1
SHORTNESS OF BREATH: 1

## 2025-06-04 ASSESSMENT — PATIENT HEALTH QUESTIONNAIRE - PHQ9
2. FEELING DOWN, DEPRESSED OR HOPELESS: NOT AT ALL
SUM OF ALL RESPONSES TO PHQ QUESTIONS 1-9: 0
SUM OF ALL RESPONSES TO PHQ QUESTIONS 1-9: 0
1. LITTLE INTEREST OR PLEASURE IN DOING THINGS: NOT AT ALL
SUM OF ALL RESPONSES TO PHQ QUESTIONS 1-9: 0
SUM OF ALL RESPONSES TO PHQ QUESTIONS 1-9: 0

## 2025-06-04 NOTE — PROGRESS NOTES
Chief Complaint   Patient presents with    Established New Doctor        Juan SALGUERO Yousuf is a 65 y.o. female who presents today for Established New Doctor     New  Patient , has 3 kids , long term  female partner since 2013,  14 grandkids.  Currently waiting on disability due to cardiac disease  3/3/2023 CABG cardiac rehab completed  History of smoking, but has been trying to decrease to 1-2 cigartte per days  She is currently following up with Dr. Strange, who has been managing her coronary artery disease, smoking cessation, on Wellbutrin, hyperlipidemia, and possible vascular disease,  She had a history of hypothyroidism, has been taking her levothyroxine in the morning along with other medications, she also reports has been in 175 mcg in the past, but has not been taking it over the last few months,  She also reports recently fell on her tub, and started having left shoulder pain, since Thursday,    She also has a history of chronic vertigo, especially in the morning when she wakes up, but gets better during the day,    She is also currently undergoing workup for shortness of breath with exertion, and possible claudication, especially when she is walking uphill, she is planning to have the vascular testing recommended by cardiology,    She is requesting refill of her inhalers, she reports has been using it for bronchitis, which or usually is induced by exposure to fumes,      Wt Readings from Last 3 Encounters:   06/04/25 73.5 kg (162 lb)   06/03/25 75.1 kg (165 lb 8 oz)   01/29/25 73.5 kg (162 lb)     Vitals:    06/04/25 1007   BP: 118/70   BP Site: Left Upper Arm   Patient Position: Sitting   Pulse: 60   SpO2: 97%   Weight: 73.5 kg (162 lb)   Height: 1.676 m (5' 6\")        Assessment and plan:  1. CAD, multiple vessel  -     Hemoglobin A1C; Future  -     CBC with Auto Differential; Future  -     Comprehensive Metabolic Panel; Future  -     Lipid Panel; Future  -     TSH reflex to FT4; Future  2.

## 2025-06-12 DIAGNOSIS — I25.10 CAD, MULTIPLE VESSEL: ICD-10-CM

## 2025-06-12 DIAGNOSIS — E03.9 HYPOTHYROIDISM, UNSPECIFIED TYPE: ICD-10-CM

## 2025-06-12 DIAGNOSIS — R73.03 PREDIABETES: ICD-10-CM

## 2025-06-12 LAB
ALBUMIN SERPL-MCNC: 3.6 G/DL (ref 3.2–4.6)
ALBUMIN/GLOB SERPL: 1.1 (ref 1–1.9)
ALP SERPL-CCNC: 102 U/L (ref 35–104)
ALT SERPL-CCNC: 16 U/L (ref 8–45)
ANION GAP SERPL CALC-SCNC: 10 MMOL/L (ref 7–16)
AST SERPL-CCNC: 20 U/L (ref 15–37)
BASOPHILS # BLD: 0.08 K/UL (ref 0–0.2)
BASOPHILS NFR BLD: 1.1 % (ref 0–2)
BILIRUB SERPL-MCNC: 0.2 MG/DL (ref 0–1.2)
BUN SERPL-MCNC: 13 MG/DL (ref 8–23)
CALCIUM SERPL-MCNC: 9 MG/DL (ref 8.8–10.2)
CHLORIDE SERPL-SCNC: 104 MMOL/L (ref 98–107)
CHOLEST SERPL-MCNC: 184 MG/DL (ref 0–200)
CO2 SERPL-SCNC: 26 MMOL/L (ref 20–29)
CREAT SERPL-MCNC: 1.05 MG/DL (ref 0.6–1.1)
DIFFERENTIAL METHOD BLD: NORMAL
EOSINOPHIL # BLD: 0.18 K/UL (ref 0–0.8)
EOSINOPHIL NFR BLD: 2.5 % (ref 0.5–7.8)
ERYTHROCYTE [DISTWIDTH] IN BLOOD BY AUTOMATED COUNT: 13.9 % (ref 11.9–14.6)
EST. AVERAGE GLUCOSE BLD GHB EST-MCNC: 120 MG/DL
GLOBULIN SER CALC-MCNC: 3.2 G/DL (ref 2.3–3.5)
GLUCOSE SERPL-MCNC: 98 MG/DL (ref 70–99)
HBA1C MFR BLD: 5.8 % (ref 0–5.6)
HCT VFR BLD AUTO: 45.7 % (ref 35.8–46.3)
HDLC SERPL-MCNC: 41 MG/DL (ref 40–60)
HDLC SERPL: 4.5 (ref 0–5)
HGB BLD-MCNC: 14.4 G/DL (ref 11.7–15.4)
IMM GRANULOCYTES # BLD AUTO: 0.03 K/UL (ref 0–0.5)
IMM GRANULOCYTES NFR BLD AUTO: 0.4 % (ref 0–5)
LDLC SERPL CALC-MCNC: 117 MG/DL (ref 0–100)
LYMPHOCYTES # BLD: 2.2 K/UL (ref 0.5–4.6)
LYMPHOCYTES NFR BLD: 31 % (ref 13–44)
MCH RBC QN AUTO: 29.3 PG (ref 26.1–32.9)
MCHC RBC AUTO-ENTMCNC: 31.5 G/DL (ref 31.4–35)
MCV RBC AUTO: 93.1 FL (ref 82–102)
MONOCYTES # BLD: 0.46 K/UL (ref 0.1–1.3)
MONOCYTES NFR BLD: 6.5 % (ref 4–12)
NEUTS SEG # BLD: 4.15 K/UL (ref 1.7–8.2)
NEUTS SEG NFR BLD: 58.5 % (ref 43–78)
NRBC # BLD: 0 K/UL (ref 0–0.2)
PLATELET # BLD AUTO: 228 K/UL (ref 150–450)
PMV BLD AUTO: 11 FL (ref 9.4–12.3)
POTASSIUM SERPL-SCNC: 4.1 MMOL/L (ref 3.5–5.1)
PROT SERPL-MCNC: 6.8 G/DL (ref 6.3–8.2)
RBC # BLD AUTO: 4.91 M/UL (ref 4.05–5.2)
SODIUM SERPL-SCNC: 140 MMOL/L (ref 136–145)
T4 FREE SERPL-MCNC: 0.4 NG/DL (ref 0.9–1.7)
TRIGL SERPL-MCNC: 131 MG/DL (ref 0–150)
TSH W FREE THYROID IF ABNORMAL: 12.7 UIU/ML (ref 0.27–4.2)
VLDLC SERPL CALC-MCNC: 26 MG/DL (ref 6–23)
WBC # BLD AUTO: 7.1 K/UL (ref 4.3–11.1)

## 2025-06-13 ENCOUNTER — RESULTS FOLLOW-UP (OUTPATIENT)
Dept: INTERNAL MEDICINE CLINIC | Facility: CLINIC | Age: 65
End: 2025-06-13

## 2025-06-13 DIAGNOSIS — E03.9 HYPOTHYROIDISM, UNSPECIFIED TYPE: Primary | ICD-10-CM

## 2025-06-25 ENCOUNTER — RESULTS FOLLOW-UP (OUTPATIENT)
Dept: CARDIOLOGY | Age: 65
End: 2025-06-25

## (undated) DEVICE — TTL1LYR 16FR10ML 100%SIL TMPST TR: Brand: MEDLINE

## (undated) DEVICE — MPS® DELIVERY SET WITH 6 FT. DELIVERY TUBING: Brand: MPS

## (undated) DEVICE — AUTOTRANSFUSION KIT 120 MH FAST STRT AT1 FOR CATS +

## (undated) DEVICE — SOLUTION IRRIG 1000ML 0.9% SOD CHL USP POUR PLAS BTL

## (undated) DEVICE — SOLUTION IRRIG 3000ML 0.9% SOD CHL USP UROMATIC PLAS CONT

## (undated) DEVICE — FLOTRAC SENSOR WITH VAMP SYSTEM 60" / 152CM: Brand: FLOTRAC, VAMP

## (undated) DEVICE — PERFUSION PACK XCOATING 76320] TERUMO CARDIOVASCULAR]

## (undated) DEVICE — GLIDESHEATH SLENDER STAINLESS STEEL KIT: Brand: GLIDESHEATH SLENDER

## (undated) DEVICE — KIT CATH L11.5CM DIA9FR CTRL VEN POLYUR ANTIMIC COAT DBL

## (undated) DEVICE — SYSTEM ENDOSCP VES HARV W/ TOOL CANN SEAL SHT PRT BLNT TIP

## (undated) DEVICE — CANISTER, RIGID, 3000CC: Brand: MEDLINE INDUSTRIES, INC.

## (undated) DEVICE — PREVENA PEEL & PLACE SYSTEM KIT- 13 CM: Brand: PREVENA™ PEEL & PLACE™

## (undated) DEVICE — CATHETER THOR 32FR L23IN PVC 6 EYELET STR ATRAUM

## (undated) DEVICE — RADIFOCUS OPTITORQUE ANGIOGRAPHIC CATHETER: Brand: OPTITORQUE

## (undated) DEVICE — BAND COMPR L24CM REG CLR PLAS HEMSTAT EXT HK AND LOOP RETEN

## (undated) DEVICE — CABG DR WILLIAMS: Brand: MEDLINE INDUSTRIES, INC.

## (undated) DEVICE — DRAIN SURG SGL COLL PT TB FOR ATS BG OASIS

## (undated) DEVICE — GUIDEWIRE .035X210CM 1.5MMJ: Brand: MEDLINE INDUSTRIES, INC.

## (undated) DEVICE — CATHETER COR DIAG PIGTAILS PIG 145 CRV 5FR 110CM 6 SIDE H

## (undated) DEVICE — PERCUTANEOUS INSERTION KIT-ARTERIAL: Brand: PERCUTANEOUS INSERTION KIT-ARTERIAL

## (undated) DEVICE — PRESSURE MONITORING SET: Brand: TRUWAVE, VAMP PLUS

## (undated) DEVICE — CATHETER THOR 24FR L22IN PVC 5 EYELET STR ATRAUM